# Patient Record
Sex: FEMALE | Race: WHITE | Employment: OTHER | URBAN - METROPOLITAN AREA
[De-identification: names, ages, dates, MRNs, and addresses within clinical notes are randomized per-mention and may not be internally consistent; named-entity substitution may affect disease eponyms.]

---

## 2019-06-26 ENCOUNTER — TELEPHONE (OUTPATIENT)
Dept: INTERNAL MEDICINE CLINIC | Age: 69
End: 2019-06-26

## 2019-06-26 ENCOUNTER — OFFICE VISIT (OUTPATIENT)
Dept: INTERNAL MEDICINE CLINIC | Age: 69
End: 2019-06-26

## 2019-06-26 VITALS
HEART RATE: 90 BPM | RESPIRATION RATE: 16 BRPM | WEIGHT: 203 LBS | HEIGHT: 64 IN | TEMPERATURE: 97.3 F | BODY MASS INDEX: 34.66 KG/M2 | DIASTOLIC BLOOD PRESSURE: 82 MMHG | OXYGEN SATURATION: 100 % | SYSTOLIC BLOOD PRESSURE: 132 MMHG

## 2019-06-26 DIAGNOSIS — Z00.00 INITIAL MEDICARE ANNUAL WELLNESS VISIT: Primary | ICD-10-CM

## 2019-06-26 DIAGNOSIS — Z94.0 KIDNEY TRANSPLANT RECIPIENT: ICD-10-CM

## 2019-06-26 DIAGNOSIS — Z12.4 SCREENING FOR CERVICAL CANCER: ICD-10-CM

## 2019-06-26 DIAGNOSIS — Z13.5 SCREENING FOR GLAUCOMA: ICD-10-CM

## 2019-06-26 DIAGNOSIS — G47.00 INSOMNIA, UNSPECIFIED TYPE: ICD-10-CM

## 2019-06-26 DIAGNOSIS — M81.0 POSTMENOPAUSAL BONE LOSS: ICD-10-CM

## 2019-06-26 DIAGNOSIS — Z12.39 BREAST SCREENING, UNSPECIFIED: ICD-10-CM

## 2019-06-26 DIAGNOSIS — Z23 ENCOUNTER FOR IMMUNIZATION: ICD-10-CM

## 2019-06-26 DIAGNOSIS — C85.91 NON-HODGKIN LYMPHOMA OF LYMPH NODES OF NECK, UNSPECIFIED NON-HODGKIN LYMPHOMA TYPE (HCC): ICD-10-CM

## 2019-06-26 PROBLEM — E66.9 OBESITY (BMI 30-39.9): Status: ACTIVE | Noted: 2019-06-26

## 2019-06-26 PROBLEM — G47.09 OTHER INSOMNIA: Chronic | Status: ACTIVE | Noted: 2019-06-26

## 2019-06-26 RX ORDER — TEMAZEPAM 7.5 MG/1
7.5 CAPSULE ORAL
Qty: 30 CAP | Refills: 5 | Status: SHIPPED | OUTPATIENT
Start: 2019-06-26 | End: 2019-12-30 | Stop reason: SDUPTHER

## 2019-06-26 RX ORDER — AMITRIPTYLINE HYDROCHLORIDE 50 MG/1
50 TABLET, FILM COATED ORAL
COMMUNITY
Start: 2019-04-25 | End: 2019-11-04 | Stop reason: SDUPTHER

## 2019-06-26 RX ORDER — SODIUM BICARBONATE 650 MG/1
TABLET ORAL 4 TIMES DAILY
COMMUNITY
End: 2019-12-06 | Stop reason: SDUPTHER

## 2019-06-26 RX ORDER — CIPROFLOXACIN 500 MG/1
500 TABLET ORAL
COMMUNITY
Start: 2019-03-25 | End: 2020-06-29 | Stop reason: ALTCHOICE

## 2019-06-26 RX ORDER — SERTRALINE HYDROCHLORIDE 50 MG/1
50 TABLET, FILM COATED ORAL
COMMUNITY
Start: 2019-04-28 | End: 2019-06-26

## 2019-06-26 RX ORDER — SUVOREXANT 10 MG/1
10 TABLET, FILM COATED ORAL
COMMUNITY
Start: 2019-06-03 | End: 2019-06-26

## 2019-06-26 RX ORDER — PREDNISONE 5 MG/1
5 TABLET ORAL DAILY
COMMUNITY
Start: 2019-03-30 | End: 2020-01-27

## 2019-06-26 RX ORDER — FERROUS FUMARATE 324(106)MG
TABLET ORAL DAILY
COMMUNITY
End: 2019-12-30 | Stop reason: SDUPTHER

## 2019-06-26 RX ORDER — CYCLOSPORINE 25 MG/1
25 CAPSULE, GELATIN COATED ORAL 2 TIMES DAILY
COMMUNITY
Start: 2019-06-03 | End: 2020-06-29

## 2019-06-26 NOTE — PATIENT INSTRUCTIONS
Medicare Wellness Visit, Female     The best way to live healthy is to have a lifestyle where you eat a well-balanced diet, exercise regularly, limit alcohol use, and quit all forms of tobacco/nicotine, if applicable. Regular preventive services are another way to keep healthy. Preventive services (vaccines, screening tests, monitoring & exams) can help personalize your care plan, which helps you manage your own care. Screening tests can find health problems at the earliest stages, when they are easiest to treat. [facility] follows the current, evidence-based guidelines published by the [facility] ([facility]) when recommending preventive services for our patients. Because we follow these guidelines, sometimes recommendations change over time as research supports it. (For example, mammograms used to be recommended annually. Even though Medicare will still pay for an annual mammogram, the newer guidelines recommend a mammogram every two years for women of average risk.)  Of course, you and your doctor may decide to screen more often for some diseases, based on your risk and your health status. Preventive services for you include:  - Medicare offers their members a free annual wellness visit, which is time for you and your primary care provider to discuss and plan for your preventive service needs. Take advantage of this benefit every year!  -All adults over the age of 72 should receive the recommended pneumonia vaccines. Current USPSTF guidelines recommend a series of two vaccines for the best pneumonia protection.   -All adults should have a flu vaccine yearly and a tetanus vaccine every 10 years. All adults age 61 and older should receive a shingles vaccine once in their lifetime.    -A bone mass density test is recommended when a woman turns 65 to screen for osteoporosis. This test is only recommended one time, as a screening.  Some providers will use this same test as a disease monitoring tool if you already have osteoporosis. -All adults age 38-68 who are overweight should have a diabetes screening test once every three years.   -Other screening tests and preventive services for persons with diabetes include: an eye exam to screen for diabetic retinopathy, a kidney function test, a foot exam, and stricter control over your cholesterol.   -Cardiovascular screening for adults with routine risk involves an electrocardiogram (ECG) at intervals determined by your doctor.   -Colorectal cancer screenings should be done for adults age 54-65 with no increased risk factors for colorectal cancer. There are a number of acceptable methods of screening for this type of cancer. Each test has its own benefits and drawbacks. Discuss with your doctor what is most appropriate for you during your annual wellness visit. The different tests include: colonoscopy (considered the best screening method), a fecal occult blood test, a fecal DNA test, and sigmoidoscopy. -Breast cancer screenings are recommended every other year for women of normal risk, age 54-69.  -Cervical cancer screenings for women over age 72 are only recommended with certain risk factors.   -All adults born between [facility] should be screened once for Hepatitis C. Here is a list of your current Health Maintenance items (your personalized list of preventive services) with a due date:  Health Maintenance Due   Topic Date Due    Hepatitis C Test  1950    DTaP/Tdap/Td  (1 - Tdap) 03/10/1969    Shingles Vaccine (1 of 2) 03/10/1998    Mammogram  03/10/1998    Stool testing for trace blood  03/10/1998    Glaucoma Screening   03/10/2013    Bone Mineral Density   03/10/2013    Pneumococcal Vaccine (1 of 2 - PCV13) 03/10/2013    Annual Well Visit  04/13/2019       Try to get mammogram, pap/pelvic and dexa scan done before next visit with me.

## 2019-06-26 NOTE — PROGRESS NOTES
Identified pt with two pt identifiers(name and ). Reviewed record in preparation for visit and have obtained necessary documentation. Chief Complaint   Patient presents with   [address] Maintenance Due   Topic    Hepatitis C Screening     DTaP/Tdap/Td series (1 - Tdap)    Shingrix Vaccine Age 50> (1 of 2)    BREAST CANCER SCRN MAMMOGRAM     FOBT Q 1 YEAR AGE 50-75     GLAUCOMA SCREENING Q2Y     Bone Densitometry (Dexa) Screening     Pneumococcal 65+ years (1 of 2 - PCV13)    MEDICARE YEARLY EXAM        Coordination of Care Questionnaire:   1) Have you been to an emergency room, urgent care, or hospitalized since your last visit? If yes, where when, and reason for visit? no       2. Have seen or consulted any other health care provider since your last visit? If yes, where when, and reason for visit? Stephanie Alicea, nephrologist      3) Do you have an Advanced Directive/ Living Will in place? NO  If yes, do we have a copy on file NO  If no, would you like information NO    Patient is accompanied by self I have received verbal consent from Taras Polk to discuss any/all medical information while they are present in the room.     Visit Vitals  /82 (BP 1 Location: Right arm, BP Patient Position: Sitting)   Pulse 90   Temp 97.3 °F (36.3 °C) (Oral)   Resp 16   Ht 5' 4\" (1.626 m)   Wt 203 lb (92.1 kg)   SpO2 100%   BMI 34.84 kg/m²

## 2019-06-26 NOTE — PROGRESS NOTES
Bibiana Mcconnell is a 68 y.o. female who presents for evaluation of awv, npv. Used to see dr Nimco Murphy in [place], last saw him about 3 years ago. Has followed every 6 months with dr Elia Gomez for years sp kidney transplant. Will follow with dr Maren Lentz now. Her daughter sees dr Aubrie Bartholomew here. Overall doing well, biggest issue is poor sleep.       ROS:  Constitutional: negative for fevers, chills, anorexia and weight loss  Eyes:   negative for visual disturbance and irritation  ENT:   negative for tinnitus,sore throat,nasal congestion,ear pain,hoarseness  Respiratory:  negative for cough, hemoptysis, dyspnea,wheezing  CV:   negative for chest pain, palpitations, lower extremity edema  GI:   negative for nausea, vomiting, diarrhea, abdominal pain,melena  Genitourinary: negative for frequency, dysuria and hematuria  Musculoskel: negative for myalgias, arthralgias, back pain, muscle weakness, joint pain  Neurological:  negative for headaches, dizziness, focal weakness, numbness  Psychiatric:     Negative for depression or anxiety      Past Medical History:   Diagnosis Date    Cancer ([facility] [address]     Chronic kidney disease        Past Surgical History:   Procedure Laterality Date    HX GYN      hysterectomy     HX TRANSPLANT  04/03/1997    kidney       Family History   Problem Relation Age of Onset    Diabetes Mother        Social History     Socioeconomic History    Marital status:      Spouse name: Not on file    Number of children: Not on file    Years of education: Not on file    Highest education level: Not on file   Occupational History    Not on file   Social Needs    Financial resource strain: Not on file    Food insecurity:     Worry: Not on file     Inability: Not on file    Transportation needs:     Medical: Not on file     Non-medical: Not on file   Tobacco Use    Smoking status: Never Smoker    Smokeless tobacco: Never Used   Substance and Sexual Activity    Alcohol use: Not Currently     Frequency: Never    Drug use: Never    Sexual activity: Not Currently   Lifestyle    Physical activity:     Days per week: Not on file     Minutes per session: Not on file    Stress: Not on file   Relationships    Social connections:     Talks on phone: Not on file     Gets together: Not on file     Attends Hoahaoism service: Not on file     Active member of club or organization: Not on file     Attends meetings of clubs or organizations: Not on file     Relationship status: Not on file    Intimate partner violence:     Fear of current or ex partner: Not on file     Emotionally abused: Not on file     Physically abused: Not on file     Forced sexual activity: Not on file   Other Topics Concern    Not on file   Social History Narrative    Not on file            Visit Vitals  /82 (BP 1 Location: Right arm, BP Patient Position: Sitting)   Pulse 90   Temp 97.3 °F (36.3 °C) (Oral)   Resp 16   Ht 5' 4\" (1.626 m)   Wt 203 lb (92.1 kg)   LMP  (Exact Date)   SpO2 100%   BMI 34.84 kg/m²       Physical Examination:   General - Well appearing female  HEENT - PERRL, TM no erythema/opacification, normal nasal turbinates, no oropharyngeal erythema or exudate, MMM  Neck - supple, no bruits, no thyroidomegaly, no lymphadenopathy  Pulm - clear to auscultation bilaterally  Cardio - RRR, normal S1 S2, no murmur  Abd - soft, nontender, no masses, no HSM  Extrem - no edema, +2 distal pulses  Neuro-  No focal deficits, CN intact     Assessment/Plan:    1. Insomnia--no improvement with belsomra, and is cost prohibitive. Fransisco Roach worked well, but insurance stopped covering it. rx to try elavil  2. Hx kidney transplant--on cyclospine and prednisone. Will follow with dr Kaykay Rutherford  3. Hx NHL--last was 2005, no longer follows with oncology. Had been dr Michael Huston  4. Hx RA--has never been on any dmards, had seen rheum briefly, dr Monster Vincent scan ordered. Had colon with dr Cochran Done.   Referral to gyn and ophtho. rtc 6 months. Get records from dr Kevin Chu, he did labs in April (pt states he checked tsh, a1c, flp etal)        Francisco Mercury III, DO              This is an Initial Medicare Annual Wellness Exam (AWV) (Performed 12 months after IPPE or effective date of Medicare Part B enrollment, Once in a lifetime)    I have reviewed the patient's medical history in detail and updated the computerized patient record. History     Past Medical History:   Diagnosis Date    Cancer ([facility] [address]     Chronic kidney disease       Past Surgical History:   Procedure Laterality Date    HX GYN      hysterectomy     HX TRANSPLANT  04/03/1997    kidney     Current Outpatient Medications   Medication Sig Dispense Refill    amitriptyline (ELAVIL) 50 mg tablet Take 50 mg by mouth nightly.  ciprofloxacin HCl (CIPRO) 500 mg tablet Take 500 mg by mouth every Monday, Wednesday, Friday.  BELSOMRA 10 mg tablet Take 10 mg by mouth nightly.  sertraline (ZOLOFT) 50 mg tablet Take 50 mg by mouth nightly.  predniSONE (DELTASONE) 5 mg tablet Take 5 mg by mouth daily.  cycloSPORINE (SANDIMMUNE) 25 mg capsule Take 25 mg by mouth two (2) times a day. Take 3 tablets 2 times a day      calcium-cholecalciferol, d3, (CALCIUM 600 + D) 600-125 mg-unit tab Take 600 mg by mouth two (2) times a day.  ferrous fumarate 324 mg (106 mg iron) tab Take  by mouth.  sodium bicarbonate 650 mg tablet Take  by mouth four (4) times daily. Not on File  Family History   Problem Relation Age of Onset    Diabetes Mother      Social History     Tobacco Use    Smoking status: Never Smoker    Smokeless tobacco: Never Used   Substance Use Topics    Alcohol use: Not Currently     Frequency: Never     Patient Active Problem List   Diagnosis Code    Obesity (BMI 30-39. 9) E66.9    Other insomnia G47.09       Depression Risk Factor Screening:     3 most recent PHQ Screens 6/26/2019   Little interest or pleasure in doing things Not at all   Feeling down, depressed, irritable, or hopeless Not at all   Total Score PHQ 2 0     Alcohol Risk Factor Screening: You do not drink alcohol or very rarely. Functional Ability and Level of Safety:     Hearing Loss  Hearing is good. Activities of Daily Living  The home contains: no safety equipment. Patient does total self care    Fall Risk  Fall Risk Assessment, last 12 mths 6/26/2019   Able to walk? Yes   Fall in past 12 months? No       Abuse Screen  Patient is not abused. Lives with daughter now. Cognitive Screening   Evaluation of Cognitive Function:  Has your family/caregiver stated any concerns about your memory: no  Normal    Patient Care Team   Patient Care Team:  Samir Santillan DO as PCP - General (Internal Medicine)    Assessment/Plan   Education and counseling provided:  Are appropriate based on today's review and evaluation  End-of-Life planning (with patient's consent)  Pneumococcal Vaccine  Screening Mammography  Screening Pap and pelvic (covered once every 2 years)    Diagnoses and all orders for this visit:    1.  Initial Medicare annual wellness visit         Health Maintenance Due   Topic Date Due    Hepatitis C Screening  1950    DTaP/Tdap/Td series (1 - Tdap) 03/10/1969    Shingrix Vaccine Age 50> (1 of 2) 03/10/1998    BREAST CANCER SCRN MAMMOGRAM  03/10/1998    FOBT Q 1 YEAR AGE 50-75  03/10/1998    GLAUCOMA SCREENING Q2Y  03/10/2013    Bone Densitometry (Dexa) Screening  03/10/2013    Pneumococcal 65+ years (1 of 2 - PCV13) 03/10/2013    MEDICARE YEARLY EXAM  04/13/2019

## 2019-07-30 ENCOUNTER — HOSPITAL ENCOUNTER (OUTPATIENT)
Dept: MAMMOGRAPHY | Age: 69
Discharge: HOME | End: 2019-07-30
Attending: INTERNAL MEDICINE
Payer: MEDICARE

## 2019-07-30 DIAGNOSIS — M81.0 POSTMENOPAUSAL BONE LOSS: ICD-10-CM

## 2019-07-30 DIAGNOSIS — Z12.39 BREAST SCREENING, UNSPECIFIED: ICD-10-CM

## 2019-07-30 PROCEDURE — 77067 SCR MAMMO BI INCL CAD: CPT

## 2019-07-30 PROCEDURE — 77080 DXA BONE DENSITY AXIAL: CPT

## 2019-08-02 NOTE — PROGRESS NOTES
Called, spoke to pt. Two identifiers confirmed. Pt notified of results/recommendations per Dr. Amirah Dodson. Pt verbalized understanding of information discussed w/ no further questions at this time.

## 2019-08-07 ENCOUNTER — DOCUMENTATION ONLY (OUTPATIENT)
Dept: INTERNAL MEDICINE CLINIC | Age: 69
End: 2019-08-07

## 2019-08-19 ENCOUNTER — HOSPITAL ENCOUNTER (OUTPATIENT)
Dept: INFUSION THERAPY | Age: 69
Discharge: HOME | End: 2019-08-19
Payer: MEDICARE

## 2019-08-19 ENCOUNTER — TELEPHONE (OUTPATIENT)
Dept: INTERNAL MEDICINE CLINIC | Age: 69
End: 2019-08-19

## 2019-08-19 VITALS
DIASTOLIC BLOOD PRESSURE: 81 MMHG | SYSTOLIC BLOOD PRESSURE: 126 MMHG | TEMPERATURE: 97.7 F | HEART RATE: 93 BPM | RESPIRATION RATE: 18 BRPM | OXYGEN SATURATION: 100 %

## 2019-08-19 LAB
ALBUMIN SERPL-MCNC: 3 G/DL (ref 3.5–5)
ANION GAP SERPL CALC-SCNC: 6 MMOL/L (ref 5–15)
BUN SERPL-MCNC: 19 MG/DL (ref 6–20)
BUN/CREAT SERPL: 19 (ref 12–20)
CALCIUM SERPL-MCNC: 8.4 MG/DL (ref 8.5–10.1)
CHLORIDE SERPL-SCNC: 114 MMOL/L (ref 97–108)
CO2 SERPL-SCNC: 21 MMOL/L (ref 21–32)
CREAT SERPL-MCNC: 0.99 MG/DL (ref 0.55–1.02)
GLUCOSE SERPL-MCNC: 160 MG/DL (ref 65–100)
MAGNESIUM SERPL-MCNC: 1.9 MG/DL (ref 1.6–2.4)
PHOSPHATE SERPL-MCNC: 3.8 MG/DL (ref 2.6–4.7)
PHOSPHATE SERPL-MCNC: 3.8 MG/DL (ref 2.6–4.7)
POTASSIUM SERPL-SCNC: 4 MMOL/L (ref 3.5–5.1)
SODIUM SERPL-SCNC: 141 MMOL/L (ref 136–145)

## 2019-08-19 PROCEDURE — 84100 ASSAY OF PHOSPHORUS: CPT

## 2019-08-19 PROCEDURE — 36415 COLL VENOUS BLD VENIPUNCTURE: CPT

## 2019-08-19 PROCEDURE — 83735 ASSAY OF MAGNESIUM: CPT

## 2019-08-19 PROCEDURE — 80069 RENAL FUNCTION PANEL: CPT

## 2019-08-19 NOTE — PROGRESS NOTES
[address] @ [facility] VISIT NOTE     2597 Patient arrives for Labs/Prolia Injection without acute problems. Please see connect care for complete assessment and education provided. Vital signs stable throughout and prior to discharge, Pt. Informed RN that she was taking Cyclosporine & Prednisone with RN unable to confirm with referring MD to give medication @ this time. Pharmacist stated medication is a Class C & could increase the side effects but, it should be safe to give. Patient stated she would prefer to discuss with her transplant MD prior to receiving Prolia. RN instructed patient that her labs would be good for 30 days & to reschedule her [facility] appointment as needed, after discussing with transplant MD; copy of labs given to patient with verbal understanding noted by patient.         VITAL SIGNS  Patient Vitals for the past 12 hrs:   Temp Pulse Resp BP SpO2   08/19/19 1056 97.7 °F (36.5 °C) 93 18 126/81 100 %          LAB WORK   Recent Results (from the past 12 hour(s))   MAGNESIUM    Collection Time: 08/19/19 11:10 AM   Result Value Ref Range    Magnesium 1.9 1.6 - 2.4 mg/dL   PHOSPHORUS    Collection Time: 08/19/19 11:10 AM   Result Value Ref Range    Phosphorus 3.8 2.6 - 4.7 MG/DL   RENAL FUNCTION PANEL    Collection Time: 08/19/19 11:10 AM   Result Value Ref Range    Sodium 141 136 - 145 mmol/L    Potassium 4.0 3.5 - 5.1 mmol/L    Chloride 114 (H) 97 - 108 mmol/L    CO2 21 21 - 32 mmol/L    Anion gap 6 5 - 15 mmol/L    Glucose 160 (H) 65 - 100 mg/dL    BUN 19 6 - 20 MG/DL    Creatinine 0.99 0.55 - 1.02 MG/DL    BUN/Creatinine ratio 19 12 - 20      GFR est AA >60 >60 ml/min/1.73m2    GFR est non-AA 55 (L) >60 ml/min/1.73m2    Calcium 8.4 (L) 8.5 - 10.1 MG/DL    Phosphorus 3.8 2.6 - 4.7 MG/DL    Albumin 3.0 (L) 3.5 - 5.0 g/dL

## 2019-08-19 NOTE — TELEPHONE ENCOUNTER
Spoke with a nurse from the infusion center concerning patient is starting Prolia  Infusion. The nurse need approval from 's to infuse Prolia patient is take Cyclosporine which is given by another doctor. In formed her that Dr. Clarissa Mendez  Is out of the office. She was advised to check with the doctor that prescribed the Cyclosporine.

## 2019-08-20 ENCOUNTER — HOSPITAL ENCOUNTER (OUTPATIENT)
Dept: INFUSION THERAPY | Age: 69
Discharge: HOME | End: 2019-08-20
Payer: MEDICARE

## 2019-08-20 VITALS
HEART RATE: 96 BPM | TEMPERATURE: 98.3 F | RESPIRATION RATE: 18 BRPM | SYSTOLIC BLOOD PRESSURE: 134 MMHG | DIASTOLIC BLOOD PRESSURE: 93 MMHG

## 2019-08-20 PROCEDURE — 96372 THER/PROPH/DIAG INJ SC/IM: CPT

## 2019-08-20 PROCEDURE — 74011250636 HC RX REV CODE- 250/636: Performed by: INTERNAL MEDICINE

## 2019-08-20 RX ADMIN — DENOSUMAB 60 MG: 60 INJECTION SUBCUTANEOUS at 13:38

## 2019-08-20 NOTE — PROGRESS NOTES
[address] at [address]    0903 Patient arrives for Prolia without acute problems. Please see connect care for complete assessment and education provided. Vital signs stable throughout and prior to discharge, Pt. Tolerated treatment well and discharged without incident. Patient/parent is aware of next [facility] appointment on 2/11/2020. Appointment card given to patient. Medications Verified by Sissy Patino RN & Mel Marcum RN via [place] [employer]:  1. [id number]   Patient Vitals for the past 12 hrs:   Temp Pulse Resp BP   08/20/19 1337 98.3 °F (36.8 °C) 96 18 (!) 134/93       LAB WORK done 8/19/19.

## 2019-11-04 RX ORDER — AMITRIPTYLINE HYDROCHLORIDE 50 MG/1
50 TABLET, FILM COATED ORAL
Qty: 30 TAB | Refills: 5 | Status: SHIPPED | OUTPATIENT
Start: 2019-11-04 | End: 2019-12-06 | Stop reason: SDUPTHER

## 2019-11-04 NOTE — TELEPHONE ENCOUNTER
----- Message from [facility] sent at 11/4/2019 12:54 PM EST -----  Regarding: Dr. Jamilah Killian  Contact: [phone]  Medication Refill    Best contact number(s): [phone]      Name of medication and dosage if known: \"Amitriptyline\" HCL 50mg      Is patient out of this medication (yes/no):Yes, been out for a week.       Pharmacy name: [facility]    Pharmacy listed in chart? (yes/no):Yes  Pharmacy phone number: [phone]    [address]

## 2019-11-04 NOTE — TELEPHONE ENCOUNTER
PCP: Sulaiman Florez DO    Last appt: 6/26/2019  Future Appointments   Date Time Provider Yoseph Vincentisti   12/23/2019 11:45 AM Ashley Bonilla III, DO [facility]   2/17/2020 11:00 AM [facility]       Requested Prescriptions     Pending Prescriptions Disp Refills    amitriptyline (ELAVIL) 50 mg tablet 30 Tab 5     Sig: Take 1 Tab by mouth nightly.

## 2019-11-25 ENCOUNTER — TELEPHONE (OUTPATIENT)
Dept: INTERNAL MEDICINE CLINIC | Age: 69
End: 2019-11-25

## 2019-11-25 NOTE — TELEPHONE ENCOUNTER
Patient states she needs a call back to get an Acute Appt asap for Joint pain in her legs. Please call.  Thank you

## 2019-11-26 NOTE — TELEPHONE ENCOUNTER
Called, spoke to pt. Two identifiers confirmed. Appointment scheduled for 12/6 @ 130 with Dr. Angy Reilly.   Pt verbalized understanding of information discussed w/ no further questions at this time.

## 2019-11-27 ENCOUNTER — HOSPITAL ENCOUNTER (EMERGENCY)
Age: 69
Discharge: HOME | End: 2019-11-27
Attending: EMERGENCY MEDICINE
Payer: MEDICARE

## 2019-11-27 ENCOUNTER — APPOINTMENT (OUTPATIENT)
Dept: GENERAL RADIOLOGY | Age: 69
End: 2019-11-27
Attending: PHYSICIAN ASSISTANT
Payer: MEDICARE

## 2019-11-27 VITALS
BODY MASS INDEX: 32.03 KG/M2 | WEIGHT: 192.24 LBS | SYSTOLIC BLOOD PRESSURE: 145 MMHG | DIASTOLIC BLOOD PRESSURE: 95 MMHG | RESPIRATION RATE: 20 BRPM | HEART RATE: 103 BPM | HEIGHT: 65 IN | OXYGEN SATURATION: 97 % | TEMPERATURE: 97.5 F

## 2019-11-27 DIAGNOSIS — M54.42 ACUTE LEFT-SIDED LOW BACK PAIN WITH LEFT-SIDED SCIATICA: Primary | ICD-10-CM

## 2019-11-27 LAB
APPEARANCE UR: ABNORMAL
BACTERIA URNS QL MICRO: ABNORMAL /HPF
BILIRUB UR QL: NEGATIVE
COLOR UR: ABNORMAL
EPITH CASTS URNS QL MICRO: ABNORMAL /LPF
GLUCOSE UR STRIP.AUTO-MCNC: NEGATIVE MG/DL
HGB UR QL STRIP: NEGATIVE
KETONES UR QL STRIP.AUTO: NEGATIVE MG/DL
LEUKOCYTE ESTERASE UR QL STRIP.AUTO: ABNORMAL
NITRITE UR QL STRIP.AUTO: NEGATIVE
PH UR STRIP: 6 [PH] (ref 5–8)
PROT UR STRIP-MCNC: NEGATIVE MG/DL
RBC #/AREA URNS HPF: ABNORMAL /HPF (ref 0–5)
SP GR UR REFRACTOMETRY: 1.01 (ref 1–1.03)
UA: UC IF INDICATED,UAUC: ABNORMAL
UROBILINOGEN UR QL STRIP.AUTO: 2 EU/DL (ref 0.2–1)
WBC URNS QL MICRO: ABNORMAL /HPF (ref 0–4)

## 2019-11-27 PROCEDURE — 74011250637 HC RX REV CODE- 250/637: Performed by: PHYSICIAN ASSISTANT

## 2019-11-27 PROCEDURE — 87077 CULTURE AEROBIC IDENTIFY: CPT

## 2019-11-27 PROCEDURE — 99283 EMERGENCY DEPT VISIT LOW MDM: CPT

## 2019-11-27 PROCEDURE — 74011000250 HC RX REV CODE- 250: Performed by: PHYSICIAN ASSISTANT

## 2019-11-27 PROCEDURE — 74011636637 HC RX REV CODE- 636/637: Performed by: PHYSICIAN ASSISTANT

## 2019-11-27 PROCEDURE — 87086 URINE CULTURE/COLONY COUNT: CPT

## 2019-11-27 PROCEDURE — 72100 X-RAY EXAM L-S SPINE 2/3 VWS: CPT

## 2019-11-27 PROCEDURE — 87186 SC STD MICRODIL/AGAR DIL: CPT

## 2019-11-27 PROCEDURE — 81001 URINALYSIS AUTO W/SCOPE: CPT

## 2019-11-27 RX ORDER — HYDROCODONE BITARTRATE AND ACETAMINOPHEN 5; 325 MG/1; MG/1
1 TABLET ORAL
Status: COMPLETED | OUTPATIENT
Start: 2019-11-27 | End: 2019-11-27

## 2019-11-27 RX ORDER — DIPHENHYDRAMINE HCL 25 MG
25 CAPSULE ORAL
Status: COMPLETED | OUTPATIENT
Start: 2019-11-27 | End: 2019-11-27

## 2019-11-27 RX ORDER — LIDOCAINE 4 G/100G
1 PATCH TOPICAL
Status: DISCONTINUED | OUTPATIENT
Start: 2019-11-27 | End: 2019-11-27 | Stop reason: HOSPADM

## 2019-11-27 RX ORDER — METHOCARBAMOL 750 MG/1
750 TABLET, FILM COATED ORAL
Status: COMPLETED | OUTPATIENT
Start: 2019-11-27 | End: 2019-11-27

## 2019-11-27 RX ORDER — PREDNISONE 20 MG/1
60 TABLET ORAL
Status: COMPLETED | OUTPATIENT
Start: 2019-11-27 | End: 2019-11-27

## 2019-11-27 RX ORDER — PREDNISONE 10 MG/1
TABLET ORAL
Qty: 21 TAB | Refills: 0 | Status: SHIPPED | OUTPATIENT
Start: 2019-11-27 | End: 2019-12-03

## 2019-11-27 RX ORDER — LIDOCAINE 4 G/100G
PATCH TOPICAL
Qty: 15 PATCH | Refills: 0 | Status: SHIPPED | OUTPATIENT
Start: 2019-11-27 | End: 2019-12-23

## 2019-11-27 RX ORDER — TRAMADOL HYDROCHLORIDE 50 MG/1
50 TABLET ORAL
Qty: 10 TAB | Refills: 0 | Status: SHIPPED | OUTPATIENT
Start: 2019-11-27 | End: 2019-11-30

## 2019-11-27 RX ORDER — METHOCARBAMOL 750 MG/1
750 TABLET, FILM COATED ORAL 4 TIMES DAILY
Qty: 20 TAB | Refills: 0 | Status: SHIPPED | OUTPATIENT
Start: 2019-11-27 | End: 2019-12-23

## 2019-11-27 RX ADMIN — METHOCARBAMOL TABLETS 750 MG: 750 TABLET, COATED ORAL at 14:39

## 2019-11-27 RX ADMIN — PREDNISONE 60 MG: 20 TABLET ORAL at 14:39

## 2019-11-27 RX ADMIN — HYDROCODONE BITARTRATE AND ACETAMINOPHEN 1 TABLET: 5; 325 TABLET ORAL at 14:39

## 2019-11-27 RX ADMIN — DIPHENHYDRAMINE HYDROCHLORIDE 25 MG: 25 CAPSULE ORAL at 15:52

## 2019-11-27 NOTE — ED PROVIDER NOTES
EMERGENCY DEPARTMENT HISTORY AND PHYSICAL EXAM      Date: 11/27/2019  Patient Name: Lester Lind    History of Presenting Illness     Chief Complaint   Patient presents with    Back Pain     Pt reports back pain radiating down left leg since yesterday.  Leg Pain       History Provided By: Patient    HPI: Lester Lind, 68 y.o. female with PMHx significant for kidney transplant, presents by POV to the ED with cc of left lower back pain that radiates down the left leg. Her pain started yesterday. There is no injury. Her pain is been constant since onset. It is progressively worsened. The pain is exacerbated by movement. She is taken Tylenol and applied an icy hot patch without relief. There are no other complaints, changes, or physical findings at this time. PCP: Lois De Jesus, DO    No current facility-administered medications on file prior to encounter. Current Outpatient Medications on File Prior to Encounter   Medication Sig Dispense Refill    amitriptyline (ELAVIL) 50 mg tablet Take 1 Tab by mouth nightly. 30 Tab 5    ciprofloxacin HCl (CIPRO) 500 mg tablet Take 500 mg by mouth every Monday, Wednesday, Friday.  predniSONE (DELTASONE) 5 mg tablet Take 5 mg by mouth daily.  cycloSPORINE (SANDIMMUNE) 25 mg capsule Take 25 mg by mouth two (2) times a day. Take 3 tablets 2 times a day      calcium-cholecalciferol, d3, (CALCIUM 600 + D) 600-125 mg-unit tab Take 600 mg by mouth two (2) times a day.  ferrous fumarate 324 mg (106 mg iron) tab Take  by mouth.  sodium bicarbonate 650 mg tablet Take  by mouth four (4) times daily.  temazepam (RESTORIL) 7.5 mg capsule Take 1 Cap by mouth nightly as needed for Sleep.  Max Daily Amount: 7.5 mg. 30 Cap 5       Past History     Past Medical History:  Past Medical History:   Diagnosis Date    Cancer ([facility] [address]     Chronic kidney disease        Past Surgical History:  Past Surgical History:   Procedure Laterality Date    HX GYN      hysterectomy     HX TRANSPLANT  04/03/1997    kidney       Family History:  Family History   Problem Relation Age of Onset    Diabetes Mother        Social History:  Social History     Tobacco Use    Smoking status: Never Smoker    Smokeless tobacco: Never Used   Substance Use Topics    Alcohol use: Not Currently     Frequency: Never    Drug use: Never       Allergies:  No Known Allergies      Review of Systems   Review of Systems   Constitutional: Negative for chills, diaphoresis and fever. HENT: Negative for congestion, ear pain, rhinorrhea and sore throat. Respiratory: Negative for cough and shortness of breath. Cardiovascular: Negative for chest pain. Gastrointestinal: Negative for abdominal pain, constipation, diarrhea, nausea and vomiting. Denies incontinence of bowel. Genitourinary: Negative for difficulty urinating, dysuria, frequency and hematuria. Denies urinary incontinence and retention. Musculoskeletal: Positive for back pain and gait problem. Negative for arthralgias and myalgias. Neurological: Negative for weakness and headaches. Denies saddle paresthesias. All other systems reviewed and are negative. Physical Exam   Physical Exam  Vitals signs and nursing note reviewed. Constitutional:       General: She is not in acute distress. Appearance: She is well-developed. She is not diaphoretic. Comments: 68 y.o.  female    HENT:      Head: Normocephalic and atraumatic. Eyes:      General:         Right eye: No discharge. Left eye: No discharge. Conjunctiva/sclera: Conjunctivae normal.   Neck:      Musculoskeletal: Normal range of motion and neck supple. Cardiovascular:      Rate and Rhythm: Normal rate and regular rhythm. Heart sounds: Normal heart sounds. No murmur. Pulmonary:      Effort: Pulmonary effort is normal. No respiratory distress. Breath sounds: Normal breath sounds.    Musculoskeletal: Comments: BACK: Normal spinal curvatures. No step off or deformity. Tender to palpation to the lower back to the right of midline. Negative seated SLR bilaterally. Strength of the LE 5/5 and equal bilaterally. Patellar DTR's 2+ bilaterally. Ambulatory with pain. Skin:     General: Skin is warm and dry. Neurological:      Mental Status: She is alert and oriented to person, place, and time. Psychiatric:         Behavior: Behavior normal.         Diagnostic Study Results     Labs -   No results found for this or any previous visit (from the past 12 hour(s)). Radiologic Studies -   XR SPINE LUMB 2 OR 3 V   Final Result    impression: Minimal spondylosis . Medical Decision Making   I am the first provider for this patient. I reviewed the vital signs, available nursing notes, past medical history, past surgical history, family history and social history. Vital Signs-Reviewed the patient's vital signs. No data found. Records Reviewed: Nursing Notes and Old Medical Records    Provider Notes (Medical Decision Making):   Fracture, sprain, strain, UTI, spasm, DDD, DJD, herniated disk, sciatica,     ED Course:   Initial assessment performed. The patients presenting problems have been discussed, and they are in agreement with the care plan formulated and outlined with them. I have encouraged them to ask questions as they arise throughout their visit.    4:01 PM  The patient has been re-evaluated. She has been itching since receiving the medications provided in the ED. She has taken all the medications previously and does not remember this causing itching in the past. However, patient does note that she has had to take Benadryl in the past when taking pain medications. Critical Care Time: None    Disposition:  DISCHARGE NOTE:  4:40 PM  The pt is ready for discharge.  The pt's signs, symptoms, diagnosis, and discharge instructions have been discussed and pt has conveyed their understanding. The pt is to follow up as recommended or return to ER should their symptoms worsen. Plan has been discussed and pt is in agreement. PLAN:  1. Discharge Medication List as of 11/27/2019  4:20 PM      START taking these medications    Details   traMADol (ULTRAM) 50 mg tablet Take 1 Tab by mouth every six (6) hours as needed for Pain for up to 3 days. Max Daily Amount: 200 mg. Indications: pain, Print, Disp-10 Tab, R-0      predniSONE (STERAPRED DS) 10 mg dose pack Standard 6 day taper, Normal, Disp-21 Tab, R-0      methocarbamol (ROBAXIN-750) 750 mg tablet Take 1 Tab by mouth four (4) times daily. , Normal, Disp-20 Tab, R-0      lidocaine 4 % patch Apply patch for 12 hours then remove for 12 hours. , Normal, Disp-15 Patch, R-0         CONTINUE these medications which have NOT CHANGED    Details   amitriptyline (ELAVIL) 50 mg tablet Take 1 Tab by mouth nightly., Normal, Disp-30 Tab, R-5      ciprofloxacin HCl (CIPRO) 500 mg tablet Take 500 mg by mouth every Monday, Wednesday, Friday., Historical Med      predniSONE (DELTASONE) 5 mg tablet Take 5 mg by mouth daily. , Historical Med      cycloSPORINE (SANDIMMUNE) 25 mg capsule Take 25 mg by mouth two (2) times a day. Take 3 tablets 2 times a day, Historical Med      calcium-cholecalciferol, d3, (CALCIUM 600 + D) 600-125 mg-unit tab Take 600 mg by mouth two (2) times a day., Historical Med      ferrous fumarate 324 mg (106 mg iron) tab Take  by mouth., Historical Med      sodium bicarbonate 650 mg tablet Take  by mouth four (4) times daily. , Historical Med      temazepam (RESTORIL) 7.5 mg capsule Take 1 Cap by mouth nightly as needed for Sleep. Max Daily Amount: 7.5 mg., Print, Disp-30 Cap, R-5           2.    Follow-up Information     Follow up With Specialties Details Why Contact Info    Irving Grewal MD Orthopedic Surgery In 1 week As needed [address]  [phone]          Return to ED if worse Diagnosis     Clinical Impression:   1. Acute left-sided low back pain with left-sided sciatica          Please note that this dictation was completed with [employer], the computer voice recognition software. Quite often unanticipated grammatical, syntax, homophones, and other interpretive errors are inadvertently transcribed by the computer software. Please disregards these errors. Please excuse any errors that have escaped final proofreading. This note will not be viewable in [address]

## 2019-11-27 NOTE — ED NOTES
Patient given discharge instructions. Patient was able to verbalize understanding of instructions. Questions answered  Patient wheeled out of ED in stable condition.

## 2019-11-27 NOTE — DISCHARGE INSTRUCTIONS
Patient Education        Getting Back to Normal After Low Back Pain: Care Instructions  Your Care Instructions  Almost everyone has low back pain at some time. The good news is that most low back pain will go away in a few days or weeks with some basic self-care. Some people are afraid that doing too much may make their pain worse. In the past, people stayed in bed, thinking this would help their backs. Now doctors think that, in most cases, getting back to your normal activities is good for your back, as long as you avoid doing things that make your pain worse. Follow-up care is a key part of your treatment and safety. Be sure to make and go to all appointments, and call your doctor if you are having problems. It's also a good idea to know your test results and keep a list of the medicines you take. How can you care for yourself at home? Ease back into daily activities  · For the first day or two of pain, take it easy. But as soon as possible, get back to your normal daily life and activities. · Get gentle exercise, such as walking. Movement keeps your spine flexible and helps your muscles stay strong. · If you are an athlete, return to your activity carefully. Choose a low-impact option until your pain is under control. Avoid or change activities that cause pain  · Try to avoid too much bending, heavy lifting, or reaching. These movements put extra stress on your back. · In bed, try lying on your side with a pillow between your knees. Or lie on your back on the floor with a pillow under your knees. · When you sit, place a small pillow, a rolled-up towel, or a lumbar roll in the curve of your back for extra support. · Try putting one foot up on a stool or changing positions every few minutes if you have to stand still for a period of time. Pay attention to body mechanics and posture  Body mechanics are the way you use your body. Posture is the way you sit or stand. · Take extra care when you lift.  When you must lift, bend your knees and keep your back straight. Avoid twisting, and keep the load close to your body. · Stand or sit tall, with your shoulders back and your stomach pulled in to support your back. Get support when you need it  · Let people know when you need a helping hand. Get family members or friends to help out with tasks you cannot do right now. · Be honest with your doctor about how the pain affects you. · If you have had to take time off work, talk to your doctor and boss about a gradual [id number] plan. Find out if there are other ways you could do your job to avoid hurting your back again. Reduce stress  Worrying about the pain can cause you to tense the muscles in your lower back. This in turn causes more pain. Here are a few things you can do to relax your mind and your muscles:  · Take 10 to 15 minutes to sit quietly and breathe deeply. Try to focus only on your breathing. If you cannot keep thoughts away, think about things that make you feel good. · Get involved in your favorite hobby, or try something new. · Talk to a friend, read a book, or listen to your favorite music. · Find a counselor you like and trust. Talk openly and honestly about your problems. Be willing to make some changes. When should you call for help? Call 911 anytime you think you may need emergency care. For example, call if:    · You are unable to move a leg at all.   [facility] your doctor now or seek immediate medical care if:    · You have new or worse symptoms in your legs, belly, or buttocks. Symptoms may include:  ? Numbness or tingling. ? Weakness. ? Pain.     · You lose bladder or bowel control.    Watch closely for changes in your health, and be sure to contact your doctor if:    · You have a fever, lose weight, or don't feel well.     · You are not getting better as expected. Where can you learn more? Go to http://erin-demetria.info/.   Enter L202 in the search box to learn more about \"Getting Back to Normal After Low Back Pain: Care Instructions. \"  Current as of: June 26, 2019  Content Version: 12.2  © 6055-7715 [employer]. Care instructions adapted under license by [employer] (which disclaims liability or warranty for this information). If you have questions about a medical condition or this instruction, always ask your healthcare professional. [address] any warranty or liability for your use of this information. Patient Education        Sciatica: Care Instructions  Your Care Instructions    Sciatica (say \"uww-QA-ts-kuh\") is an irritation of one of the sciatic nerves, which come from the spinal cord in the lower back. The sciatic nerves and their branches extend down through the buttock to the foot. Sciatica can develop when an injured disc in the back presses against a spinal nerve root. Its main symptom is pain, numbness, or weakness that is often worse in the leg or foot than in the back. Sciatica often will improve and go away with time. Early treatment usually includes medicines and exercises to relieve pain. Follow-up care is a key part of your treatment and safety. Be sure to make and go to all appointments, and call your doctor if you are having problems. It's also a good idea to know your test results and keep a list of the medicines you take. How can you care for yourself at home? · Take pain medicines exactly as directed. ? If the doctor gave you a prescription medicine for pain, take it as prescribed. ? If you are not taking a prescription pain medicine, ask your doctor if you can take an over-the-counter medicine. · Use heat or ice to relieve pain. ? To apply heat, put a warm water bottle, heating pad set on low, or warm cloth on your back. Do not go to sleep with a heating pad on your skin. ? To use ice, put ice or a cold pack on the area for 10 to 20 minutes at a time.  Put a thin cloth between the ice and your skin.  · Avoid sitting if possible, unless it feels better than standing. · Alternate lying down with short walks. Increase your walking distance as you are able to without making your symptoms worse. · Do not do anything that makes your symptoms worse. When should you call for help? Call 911 anytime you think you may need emergency care. For example, call if:    · You are unable to move a leg at all.   [facility] your doctor now or seek immediate medical care if:    · You have new or worse symptoms in your legs or buttocks. Symptoms may include:  ? Numbness or tingling. ? Weakness. ? Pain.     · You lose bladder or bowel control.    Watch closely for changes in your health, and be sure to contact your doctor if:    · You are not getting better as expected. Where can you learn more? Go to http://erin-demetria.info/. Enter [phone] in the search box to learn more about \"Sciatica: Care Instructions. \"  Current as of: June 26, 2019  Content Version: 12.2  © 3879-5081 [employer]. Care instructions adapted under license by [employer] (which disclaims liability or warranty for this information). If you have questions about a medical condition or this instruction, always ask your healthcare professional. [address] any warranty or liability for your use of this information.

## 2019-11-29 LAB
BACTERIA SPEC CULT: ABNORMAL
CC UR VC: ABNORMAL
SERVICE CMNT-IMP: ABNORMAL

## 2019-12-06 ENCOUNTER — OFFICE VISIT (OUTPATIENT)
Dept: INTERNAL MEDICINE CLINIC | Age: 69
End: 2019-12-06

## 2019-12-06 VITALS
TEMPERATURE: 98 F | DIASTOLIC BLOOD PRESSURE: 86 MMHG | BODY MASS INDEX: 32.26 KG/M2 | HEART RATE: 102 BPM | SYSTOLIC BLOOD PRESSURE: 129 MMHG | OXYGEN SATURATION: 99 % | HEIGHT: 65 IN | WEIGHT: 193.6 LBS | RESPIRATION RATE: 16 BRPM

## 2019-12-06 DIAGNOSIS — C85.91 NON-HODGKIN LYMPHOMA OF LYMPH NODES OF NECK, UNSPECIFIED NON-HODGKIN LYMPHOMA TYPE (HCC): ICD-10-CM

## 2019-12-06 DIAGNOSIS — M06.9 RHEUMATOID ARTHRITIS, INVOLVING UNSPECIFIED SITE, UNSPECIFIED RHEUMATOID FACTOR PRESENCE: ICD-10-CM

## 2019-12-06 DIAGNOSIS — Z94.0 KIDNEY TRANSPLANT RECIPIENT: ICD-10-CM

## 2019-12-06 DIAGNOSIS — M54.42 ACUTE LEFT-SIDED LOW BACK PAIN WITH LEFT-SIDED SCIATICA: Primary | ICD-10-CM

## 2019-12-06 PROBLEM — C85.90 NHL (NON-HODGKIN'S LYMPHOMA) (HCC): Status: ACTIVE | Noted: 2019-12-06

## 2019-12-06 RX ORDER — AMITRIPTYLINE HYDROCHLORIDE 50 MG/1
50 TABLET, FILM COATED ORAL
Qty: 90 TAB | Refills: 3 | Status: SHIPPED | OUTPATIENT
Start: 2019-12-06 | End: 2020-02-14 | Stop reason: ALTCHOICE

## 2019-12-06 RX ORDER — DIAZEPAM 2 MG/1
2-4 TABLET ORAL
Qty: 20 TAB | Refills: 0 | Status: SHIPPED | OUTPATIENT
Start: 2019-12-06 | End: 2020-02-14

## 2019-12-06 RX ORDER — SODIUM BICARBONATE 650 MG/1
650 TABLET ORAL 4 TIMES DAILY
Qty: 350 TAB | Refills: 3 | Status: SHIPPED | OUTPATIENT
Start: 2019-12-06 | End: 2020-05-18

## 2019-12-06 NOTE — LETTER
December 6, 2019 [place] [id number] Dear Shikha Resendez: Thank you for requesting access to [facility]. Please follow the instructions below to securely access and download your online medical record. [facility] allows you to send messages to your doctor, view your test results, renew your prescriptions, schedule appointments, and more. How Do I Sign Up? 1. In your internet browser, go to [email or web address]  
2. Click on the First Time User? Click Here link in the Sign In box. You will be redirected to the New Member Sign Up page. 3. Enter your [facility] Access Code exactly as it appears below. You will not need to use this code after youve completed the sign-up process. If you do not sign up before the expiration date, you must request a new code. [facility] Access Code: [email or web address] Expires: 1/18/2020  2:15 PM  
 
4. Enter the last four digits of your Social Security Number (xxxx) and Date of Birth (mm/dd/yyyy) as indicated and click Submit. You will be taken to the next sign-up page. 5. Create a [facility]icory ID. This will be your [facility] login ID and cannot be changed, so think of one that is secure and easy to remember. 6. Create a [facility] password. You can change your password at any time. 7. Enter your Password Reset Question and Answer. This can be used at a later time if you forget your password. 8. Enter your e-mail address. You will receive e-mail notification when new information is available in [address] 9. Click Sign Up. You can now view and download portions of your medical record. 10. Click the Download Summary menu link to download a portable copy of your medical information. Additional Information If you have questions, please visit the Frequently Asked Questions section of the [facility] website at [email or web address] [email or web address] Remember, [facility] is NOT to be used for urgent needs. For medical emergencies, dial 911. Now available from your iPhone and Android! Sincerely, Sofie De Jesus

## 2019-12-06 NOTE — PROGRESS NOTES
Emilia Isaacs is a 68 y.o. female who presents for evaluation of low back pain with left sided sciatica. Last seen by me June 26, 2019 in npv. Was in normal state of health, when she woke up nov 27 morning with low back pain and radiation down left leg. Denies any trauma. Has never had pains in low back before. Went to ed that day, xray of low back was ok. rx given for robaxin, lidoderm patch, medrol and ultram.  Pain has improved considerably, but she is still struggling with the pain. No issues with bowels or bladder. ROS:  Constitutional: negative for fevers, chills, anorexia and weight loss  Eyes:   negative for visual disturbance and irritation  ENT:   negative for tinnitus,sore throat,nasal congestion,ear pain,hoarseness  Respiratory:  negative for cough, hemoptysis, dyspnea,wheezing  CV:   negative for chest pain, palpitations, lower extremity edema  GI:   negative for nausea, vomiting, diarrhea, abdominal pain,melena  Genitourinary: negative for frequency, dysuria and hematuria  Musculoskel: negative for myalgias, arthralgias, muscle weakness, joint pain. ++lbp with left sided sciatica.   Neurological:  negative for headaches, dizziness, focal weakness, numbness  Psychiatric:     Negative for depression or anxiety      Past Medical History:   Diagnosis Date    Cancer ([facility] [address]     Chronic kidney disease        Past Surgical History:   Procedure Laterality Date    HX GYN      hysterectomy     HX TRANSPLANT  04/03/1997    kidney       Family History   Problem Relation Age of Onset    Diabetes Mother        Social History     Socioeconomic History    Marital status:      Spouse name: Not on file    Number of children: Not on file    Years of education: Not on file    Highest education level: Not on file   Occupational History    Not on file   Social Needs    Financial resource strain: Not on file    Food insecurity:     Worry: Not on file     Inability: Not on file   [employer] needs:     Medical: Not on file     Non-medical: Not on file   Tobacco Use    Smoking status: Never Smoker    Smokeless tobacco: Never Used   Substance and Sexual Activity    Alcohol use: Not Currently     Frequency: Never    Drug use: Never    Sexual activity: Not Currently   Lifestyle    Physical activity:     Days per week: Not on file     Minutes per session: Not on file    Stress: Not on file   Relationships    Social connections:     Talks on phone: Not on file     Gets together: Not on file     Attends [employer]ism service: Not on file     Active member of club or organization: Not on file     Attends meetings of clubs or organizations: Not on file     Relationship status: Not on file    Intimate partner violence:     Fear of current or ex partner: Not on file     Emotionally abused: Not on file     Physically abused: Not on file     Forced sexual activity: Not on file   Other Topics Concern    Not on file   Social History Narrative    Not on file            Visit Vitals  /86 (BP 1 Location: Left arm, BP Patient Position: Sitting)   Pulse (!) 102   Temp 98 °F (36.7 °C) (Oral)   Resp 16   Ht 5' 5\" (1.651 m)   Wt 193 lb 9.6 oz (87.8 kg)   SpO2 99%   BMI 32.22 kg/m²       Physical Examination:   General - Well appearing female  HEENT - PERRL, TM no erythema/opacification, normal nasal turbinates, no oropharyngeal erythema or exudate, MMM  Neck - supple, no bruits, no thyroidomegaly, no lymphadenopathy  Pulm - clear to auscultation bilaterally  Cardio - RRR, normal S1 S2, no murmur  Abd - soft, nontender, no masses, no HSM  Extrem - no edema, +2 distal pulses. ++straight leg raising on left. Negative on right. Neuro-  No focal deficits, CN intact     Assessment/Plan:    1.  lbp with left sided sciatica--already finished medrol dose dea, and is on 5 mg prednisone daily already. rx given for valium to use qhs. Continue with lidoderm patch. Add ice 15 minutes bid.   Handout given for stretches. Referral to PT. Has follow up with me in 3 weeks already. If not better by then, will have her see ortho or neurosx. 2. Hx NHL--  3. Hx kidney transplant--on prednisone and cyclosporine  4.   Hx RA--not on any dmards    rtc for regular visit        Joce Menchaca III, DO

## 2019-12-06 NOTE — PROGRESS NOTES
Reviewed record in preparation for visit and have obtained necessary documentation. Identified pt with two pt identifiers(name and ). Chief Complaint   Patient presents with    LOW BACK PAIN     radiating to LE extremities    ED Follow-up       Health Maintenance Due   Topic Date Due    Hepatitis C Screening  1950    DTaP/Tdap/Td series (1 - Tdap) 03/10/1959    Shingrix Vaccine Age 50> (1 of 2) 03/10/1998    FOBT Q 1 YEAR AGE 50-75  03/10/1998    GLAUCOMA SCREENING Q2Y  03/10/2013    Influenza Age 9 to Adult  2019       Ms. Feng Avila has a reminder for a \"due or due soon\" health maintenance. I have asked that she discuss health maintenance topic(s) due with Her  primary care provider. Coordination of Care Questionnaire:  :     1) Have you been to an emergency room, urgent care clinic since your last visit? yes   Hospitalized since your last visit? no             2) Have you seen or consulted any other health care providers outside of [address] [place] [address] since your last visit? no  (Include any pap smears or colon screenings in this section.)    3) Do you have an Advance Directive on file? no    4) Are you interested in receiving information on Advance Directives? NO    Patient is accompanied by self I have received verbal consent from Moiz Dumont to discuss any/all medical information while they are present in the room.

## 2019-12-06 NOTE — PATIENT INSTRUCTIONS

## 2019-12-23 ENCOUNTER — OFFICE VISIT (OUTPATIENT)
Dept: INTERNAL MEDICINE CLINIC | Age: 69
End: 2019-12-23

## 2019-12-23 VITALS
OXYGEN SATURATION: 100 % | TEMPERATURE: 97.6 F | HEART RATE: 99 BPM | DIASTOLIC BLOOD PRESSURE: 85 MMHG | SYSTOLIC BLOOD PRESSURE: 128 MMHG | BODY MASS INDEX: 32.02 KG/M2 | WEIGHT: 192.2 LBS | RESPIRATION RATE: 14 BRPM | HEIGHT: 65 IN

## 2019-12-23 DIAGNOSIS — R73.9 HYPERGLYCEMIA: ICD-10-CM

## 2019-12-23 DIAGNOSIS — M06.9 RHEUMATOID ARTHRITIS, INVOLVING UNSPECIFIED SITE, UNSPECIFIED RHEUMATOID FACTOR PRESENCE: ICD-10-CM

## 2019-12-23 DIAGNOSIS — C85.91 NON-HODGKIN LYMPHOMA OF LYMPH NODES OF NECK, UNSPECIFIED NON-HODGKIN LYMPHOMA TYPE (HCC): ICD-10-CM

## 2019-12-23 DIAGNOSIS — Z94.0 KIDNEY TRANSPLANT RECIPIENT: Primary | ICD-10-CM

## 2019-12-23 NOTE — PROGRESS NOTES
Glenda Wyatt is a 68 y.o. female who presents for evaluation of routine follow up. Last seen by me dec 6, 2019 with lbp and left sided sciatica. Got better with ice, valium, stretches. Feels much better now. No complaints today.       ROS:  Constitutional: negative for fevers, chills, anorexia and weight loss  Eyes:   negative for visual disturbance and irritation  ENT:   negative for tinnitus,sore throat,nasal congestion,ear pain,hoarseness  Respiratory:  negative for cough, hemoptysis, dyspnea,wheezing  CV:   negative for chest pain, palpitations, lower extremity edema  GI:   negative for nausea, vomiting, diarrhea, abdominal pain,melena  Genitourinary: negative for frequency, dysuria and hematuria  Musculoskel: negative for myalgias, arthralgias, back pain, muscle weakness, joint pain  Neurological:  negative for headaches, dizziness, focal weakness, numbness  Psychiatric:     Negative for depression or anxiety      Past Medical History:   Diagnosis Date    [facility]     Chronic kidney disease        Past Surgical History:   Procedure Laterality Date    HX GYN      hysterectomy     HX TRANSPLANT  04/03/1997    kidney       Family History   Problem Relation Age of Onset    Diabetes Mother        Social History     Socioeconomic History    Marital status:      Spouse name: Not on file    Number of children: Not on file    Years of education: Not on file    Highest education level: Not on file   Occupational History    Not on file   Social Needs    Financial resource strain: Not on file    Food insecurity:     Worry: Not on file     Inability: Not on file    Transportation needs:     Medical: Not on file     Non-medical: Not on file   Tobacco Use    Smoking status: Never Smoker    Smokeless tobacco: Never Used   Substance and Sexual Activity    Alcohol use: Not Currently     Frequency: Never    Drug use: Never    Sexual activity: Not Currently   Lifestyle    Physical activity: Days per week: Not on file     Minutes per session: Not on file    Stress: Not on file   Relationships    Social connections:     Talks on phone: Not on file     Gets together: Not on file     Attends Evangelical service: Not on file     Active member of club or organization: Not on file     Attends meetings of clubs or organizations: Not on file     Relationship status: Not on file    Intimate partner violence:     Fear of current or ex partner: Not on file     Emotionally abused: Not on file     Physically abused: Not on file     Forced sexual activity: Not on file   Other Topics Concern    Not on file   Social History Narrative    Not on file            Visit Vitals  /85 (BP 1 Location: Right arm, BP Patient Position: Sitting)   Pulse 99   Temp 97.6 °F (36.4 °C) (Oral)   Resp 14   Ht 5' 5\" (1.651 m)   Wt 192 lb 3.2 oz (87.2 kg)   SpO2 100%   BMI 31.98 kg/m²       Physical Examination:   General - Well appearing female  HEENT - PERRL, TM no erythema/opacification, normal nasal turbinates, no oropharyngeal erythema or exudate, MMM  Neck - supple, no bruits, no thyroidomegaly, no lymphadenopathy  Pulm - clear to auscultation bilaterally  Cardio - RRR, normal S1 S2, no murmur  Abd - soft, nontender, no masses, no HSM  Extrem - no edema, +2 distal pulses  Neuro-  No focal deficits, CN intact     Assessment/Plan:    1. Kidney transplant patient--continue bicarabonate. Check cbc, cmp. Has appt with renal in a few weeks. On cyclosporine and prednisone. 2.  RA--on prednisone. Has never been on any dmards  3. Hx NHL--check cbc  4. Hyperglycemia--check a1c. Has been on prednisone    rx given for tdap. Had colon with dr Noris Liu.   rtc 6 months        Brian Offer III, DO

## 2019-12-23 NOTE — PATIENT INSTRUCTIONS
Office Policies    Phone calls/patient messages:            Please allow up to 24 hours for someone in the office to contact you about your call or message. Be mindful your provider may be out of the office or your message may require further review. We encourage you to use [employer] for your messages as this is a faster, more efficient way to communicate with our office                         Medication Refills:            Prescription medications require 48-72 business hours to process. We encourage you to use [employer] for your refills. For controlled medications: Please allow 72 business hours to process. Certain medications may require you to  a written prescription at our office. NO narcotic/controlled medications will be prescribed after 4pm Monday through Friday or on weekends              Form/Paperwork Completion:            Please note a $25 fee may incur for all paperwork for completed by our providers. We ask that you allow 7-10 business days. Pre-payment is due prior to picking up/faxing the completed form. You may also download your forms to [employer] to have your doctor print off. Learning About Steroids and High Blood Sugar  What are steroids? Steroid medicines (corticosteroids) are often prescribed by doctors to treat many conditions. They help calm down the body's response to inflammation. You may take them for asthma, COPD, back pain, or allergic reactions. They are also used for other conditions such as autoimmune diseases, arthritis, and certain types of cancer. These medicines can be given as pills or injections. The steroids discussed here are not the type of steroids used for body building. What is high blood sugar? Your body turns the food you eat into glucose (sugar), which it uses for energy. That's a good thing. But if your body isn't able to use the sugar right away, it can build up in your blood and cause problems.  When the blood sugar rises to a certain level, it's called high blood sugar. This is also known as hyperglycemia. What effect can steroids have on blood sugar? Steroid medicine has many benefits. But one side effect of steroids is that they can raise your blood sugar level while you take them. In most cases, this is temporary. If you already have diabetes, you may notice that your blood sugars jump higher after you take steroids. Very rarely, taking steroids may lead to a new diagnosis of diabetes. What are the symptoms of high blood sugar? The most common symptoms of high blood sugar include:  · Thirst.  · Frequent urination. · Weight loss. · Blurry vision. How is high blood sugar caused by steroids treated? If your doctor thinks your blood sugar might be too high, you will have a blood test. If your blood sugar is at a harmful level, you may need to take medicine that lowers your blood sugar. After you are finished taking steroids, your blood sugar should go back to its usual level. At that point, you won't need the medicine any longer. If you are taking medicine for another condition, your doctor may make changes to how you take that medicine. Follow-up care is a key part of your treatment and safety. Be sure to make and go to all appointments, and call your doctor if you are having problems. It's also a good idea to know your test results and keep a list of the medicines you take. Where can you learn more? Go to http://erin-demetria.info/. Enter K280 in the search box to learn more about \"Learning About Steroids and High Blood Sugar. \"  Current as of: November 6, 2018  Content Version: 12.2  © 2253-5960 [employer]. Care instructions adapted under license by [employer] (which disclaims liability or warranty for this information).  If you have questions about a medical condition or this instruction, always ask your healthcare professional. Yonis José disclaims any warranty or liability for your use of this information.

## 2019-12-23 NOTE — PROGRESS NOTES
Reviewed record in preparation for visit and have obtained necessary documentation. Identified pt with two pt identifiers(name and ). Chief Complaint   Patient presents with    Back Pain       Health Maintenance Due   Topic Date Due    Hepatitis C Screening  1950    DTaP/Tdap/Td series (1 - Tdap) 03/10/1959    Shingrix Vaccine Age 50> (1 of 2) 03/10/1998    FOBT Q 1 YEAR AGE 50-75  03/10/1998    GLAUCOMA SCREENING Q2Y  03/10/2013       Ms. Genevieve Mahmood has a reminder for a \"due or due soon\" health maintenance. I have asked that she discuss health maintenance topic(s) due with Her  primary care provider. Coordination of Care Questionnaire:  :     1) Have you been to an emergency room, urgent care clinic since your last visit? no   Hospitalized since your last visit? no             2) Have you seen or consulted any other health care providers outside of [address] [place] [address] since your last visit? no  (Include any pap smears or colon screenings in this section.)    3) Do you have an Advance Directive on file? no    4) Are you interested in receiving information on Advance Directives? NO    Patient is accompanied by self I have received verbal consent from Pepe Gonzalez to discuss any/all medical information while they are present in the room.

## 2019-12-30 DIAGNOSIS — G47.00 INSOMNIA, UNSPECIFIED TYPE: ICD-10-CM

## 2019-12-30 RX ORDER — FERROUS FUMARATE 324(106)MG
TABLET ORAL
Qty: 90 TAB | Refills: 3 | Status: SHIPPED | OUTPATIENT
Start: 2019-12-30 | End: 2020-02-14 | Stop reason: SINTOL

## 2019-12-30 RX ORDER — TEMAZEPAM 7.5 MG/1
7.5 CAPSULE ORAL
Qty: 30 CAP | Refills: 5 | Status: SHIPPED | OUTPATIENT
Start: 2019-12-30 | End: 2020-02-14

## 2020-01-27 RX ORDER — PREDNISONE 5 MG/1
TABLET ORAL
Qty: 90 TAB | Refills: 3 | Status: SHIPPED | OUTPATIENT
Start: 2020-01-27 | End: 2020-10-21 | Stop reason: SDUPTHER

## 2020-01-29 LAB
ALBUMIN SERPL-MCNC: 3.6 G/DL (ref 3.7–4.7)
ALBUMIN/GLOB SERPL: 1.6 {RATIO} (ref 1.2–2.2)
ALP SERPL-CCNC: 130 IU/L (ref 39–117)
ALT SERPL-CCNC: 14 IU/L (ref 0–32)
AST SERPL-CCNC: 18 IU/L (ref 0–40)
BASOPHILS # BLD AUTO: 0 X10E3/UL (ref 0–0.2)
BASOPHILS NFR BLD AUTO: 0 %
BILIRUB SERPL-MCNC: 0.7 MG/DL (ref 0–1.2)
BUN SERPL-MCNC: 14 MG/DL (ref 8–27)
BUN/CREAT SERPL: 15 (ref 12–28)
CALCIUM SERPL-MCNC: 9.1 MG/DL (ref 8.7–10.3)
CHLORIDE SERPL-SCNC: 106 MMOL/L (ref 96–106)
CHOLEST SERPL-MCNC: 138 MG/DL (ref 100–199)
CO2 SERPL-SCNC: 21 MMOL/L (ref 20–29)
CREAT SERPL-MCNC: 0.91 MG/DL (ref 0.57–1)
EOSINOPHIL # BLD AUTO: 0.1 X10E3/UL (ref 0–0.4)
EOSINOPHIL NFR BLD AUTO: 1 %
ERYTHROCYTE [DISTWIDTH] IN BLOOD BY AUTOMATED COUNT: 12.4 % (ref 11.7–15.4)
EST. AVERAGE GLUCOSE BLD GHB EST-MCNC: 94 MG/DL
GLOBULIN SER CALC-MCNC: 2.3 G/DL (ref 1.5–4.5)
GLUCOSE SERPL-MCNC: 97 MG/DL (ref 65–99)
HBA1C MFR BLD: 4.9 % (ref 4.8–5.6)
HCT VFR BLD AUTO: 39.6 % (ref 34–46.6)
HCV AB S/CO SERPL IA: <0.1 S/CO RATIO (ref 0–0.9)
HCV AB SERPL QL IA: NORMAL
HDLC SERPL-MCNC: 49 MG/DL
HGB BLD-MCNC: 13.4 G/DL (ref 11.1–15.9)
IMM GRANULOCYTES # BLD AUTO: 0 X10E3/UL (ref 0–0.1)
IMM GRANULOCYTES NFR BLD AUTO: 0 %
LDLC SERPL CALC-MCNC: 68 MG/DL (ref 0–99)
LYMPHOCYTES # BLD AUTO: 1.3 X10E3/UL (ref 0.7–3.1)
LYMPHOCYTES NFR BLD AUTO: 22 %
MCH RBC QN AUTO: 34.6 PG (ref 26.6–33)
MCHC RBC AUTO-ENTMCNC: 33.8 G/DL (ref 31.5–35.7)
MCV RBC AUTO: 102 FL (ref 79–97)
MONOCYTES # BLD AUTO: 0.4 X10E3/UL (ref 0.1–0.9)
MONOCYTES NFR BLD AUTO: 6 %
NEUTROPHILS # BLD AUTO: 4 X10E3/UL (ref 1.4–7)
NEUTROPHILS NFR BLD AUTO: 71 %
PLATELET # BLD AUTO: 182 X10E3/UL (ref 150–450)
POTASSIUM SERPL-SCNC: 5.2 MMOL/L (ref 3.5–5.2)
PROT SERPL-MCNC: 5.9 G/DL (ref 6–8.5)
RBC # BLD AUTO: 3.87 X10E6/UL (ref 3.77–5.28)
SODIUM SERPL-SCNC: 139 MMOL/L (ref 134–144)
TRIGL SERPL-MCNC: 103 MG/DL (ref 0–149)
VLDLC SERPL CALC-MCNC: 21 MG/DL (ref 5–40)
WBC # BLD AUTO: 5.7 X10E3/UL (ref 3.4–10.8)

## 2020-01-29 NOTE — PROGRESS NOTES
Spoke with patient using two identifiers. Patient was informed of recent labs. Patient  Verbalized understanding.

## 2020-02-03 ENCOUNTER — TELEPHONE (OUTPATIENT)
Dept: INTERNAL MEDICINE CLINIC | Age: 70
End: 2020-02-03

## 2020-02-03 NOTE — TELEPHONE ENCOUNTER
----- Message from Mike Alejo sent at 2/3/2020 10:15 AM EST -----  Regarding: Dr. Tamara Cooper: [phone]  Caller's first and last name: n/a  Reason for call: Pt stated that Temazepam medication is not working for her and with her new insurance she cannot afford the medication.  Please verify with the pt on this matter   Callback required yes/no and why: yes  Best contact number(s): [phone]  Details to clarify the request: n/a     Message copied/pasted from Shira Remy

## 2020-02-05 NOTE — TELEPHONE ENCOUNTER
#[phone]  Pt states that the Temazepam is no longer working for her to get to sleep. She will take a pill and 3 hours later still be awake. Pt would like something stronger called in for her as soon as you can. Please call with any questions. Pt states the Temazepam is also $155 per month with insurance. Is there something less expensive pt can take?

## 2020-02-06 NOTE — TELEPHONE ENCOUNTER
Jose Henriquez III, DO  You 45 minutes ago (1:06 PM)     Don't have any other suggestions other than melatonin. Routing comment      Called, left vm for pt to return call to office.

## 2020-02-10 ENCOUNTER — OFFICE VISIT (OUTPATIENT)
Dept: INTERNAL MEDICINE CLINIC | Age: 70
End: 2020-02-10

## 2020-02-10 VITALS
SYSTOLIC BLOOD PRESSURE: 115 MMHG | HEIGHT: 65 IN | OXYGEN SATURATION: 99 % | BODY MASS INDEX: 31.72 KG/M2 | DIASTOLIC BLOOD PRESSURE: 79 MMHG | RESPIRATION RATE: 16 BRPM | TEMPERATURE: 98 F | WEIGHT: 190.4 LBS | HEART RATE: 112 BPM

## 2020-02-10 DIAGNOSIS — R00.0 TACHYCARDIA: ICD-10-CM

## 2020-02-10 DIAGNOSIS — R60.9 EDEMA, UNSPECIFIED TYPE: Primary | ICD-10-CM

## 2020-02-10 NOTE — PROGRESS NOTES
SUBJECTIVE  Ms. Rolan Desai presents today acutely for     Chief Complaint   Patient presents with    Swelling     pt here today concerned of swelling in legs/feet- pt is a kindey transplant pt     The swelling is worse as the day progresses, better overnight. She is concerned about kidney infection. \"I have been having a lot of leaking\" of urine. No frequency--uses bathroom about TID. OBJECTIVE  Visit Vitals  /79 (BP 1 Location: Left arm, BP Patient Position: Sitting)   Pulse (!) 112   Temp 98 °F (36.7 °C) (Oral)   Resp 16   Ht 5' 5\" (1.651 m)   Wt 190 lb 6.4 oz (86.4 kg)   SpO2 99%   BMI 31.68 kg/m²     Gen: Pleasant 68 y.o.  female in NAD.   HEENT: PERRLA. EOMI. OP moist and pink.  Neck: Supple.  No LAD.  HEART: rapid and irregular.   LUNGS: CTAB No W/R.   ABDOMEN: S, NT, ND, BS+.   EXTREMITIES: Warm. Trace edema at best.     ASSESSMENT / PLAN    ICD-10-CM ICD-9-CM    1. Edema, unspecified type--not apparent on exam today. R60.9 782.3 Probably some degree of venous insufficiency--elevation+compression. 2. Tachycardia R00.0 785.0 AMB POC EKG ROUTINE W/ 12 LEADS, INTER & REP      EKG, 12 LEAD, INITIAL      CANCELED: AMB POC EKG ROUTINE W/ 12 LEADS, INTER & REP       I have reviewed with the patient details of the assessment and plan and all questions were answered. Relevant patient education was performed. Follow-up and Dispositions    · Return if symptoms worsen or fail to improve.

## 2020-02-10 NOTE — PATIENT INSTRUCTIONS
Office Policies    Phone calls/patient messages:            Please allow up to 24 hours for someone in the office to contact you about your call or message. Be mindful your provider may be out of the office or your message may require further review. We encourage you to use [employer] for your messages as this is a faster, more efficient way to communicate with our office                         Medication Refills:            Prescription medications require 48-72 business hours to process. We encourage you to use [employer] for your refills. For controlled medications: Please allow 72 business hours to process. Certain medications may require you to  a written prescription at our office. NO narcotic/controlled medications will be prescribed after 4pm Monday through Friday or on weekends              Form/Paperwork Completion:            Please note a $25 fee may incur for all paperwork for completed by our providers. We ask that you allow 7-10 business days. Pre-payment is due prior to picking up/faxing the completed form. You may also download your forms to [employer] to have your doctor print off.      1. Have you been to the ER, urgent care clinic since your last visit? Hospitalized since your last visit?no    2. Have you seen or consulted any other health care providers outside of the [address] [place] [address] since your last visit? Include any pap smears or colon screening.  no

## 2020-02-11 ENCOUNTER — TELEPHONE (OUTPATIENT)
Dept: INTERNAL MEDICINE CLINIC | Age: 70
End: 2020-02-11

## 2020-02-11 NOTE — TELEPHONE ENCOUNTER
Spoke with Carlos Manuel. Appointment scheduled for 2/14 @ 845 with Dr. Yohana Browne verbalized understanding of information discussed w/ no further questions at this time.

## 2020-02-11 NOTE — TELEPHONE ENCOUNTER
#[phone] states pt has foot and leg swelling. Pt also has very bad back pain. Pt would only like to see Dr. Rommel Coleman. Please call to schedule pt as soon as possible.

## 2020-02-14 ENCOUNTER — OFFICE VISIT (OUTPATIENT)
Dept: INTERNAL MEDICINE CLINIC | Age: 70
End: 2020-02-14

## 2020-02-14 VITALS
OXYGEN SATURATION: 97 % | HEART RATE: 93 BPM | HEIGHT: 65 IN | RESPIRATION RATE: 16 BRPM | SYSTOLIC BLOOD PRESSURE: 103 MMHG | TEMPERATURE: 97.1 F | DIASTOLIC BLOOD PRESSURE: 62 MMHG | BODY MASS INDEX: 31.32 KG/M2 | WEIGHT: 188 LBS

## 2020-02-14 DIAGNOSIS — G47.00 INSOMNIA, UNSPECIFIED TYPE: ICD-10-CM

## 2020-02-14 DIAGNOSIS — K64.9 HEMORRHOIDS, UNSPECIFIED HEMORRHOID TYPE: ICD-10-CM

## 2020-02-14 DIAGNOSIS — M06.9 RHEUMATOID ARTHRITIS, INVOLVING UNSPECIFIED SITE, UNSPECIFIED RHEUMATOID FACTOR PRESENCE: ICD-10-CM

## 2020-02-14 DIAGNOSIS — C85.91 NON-HODGKIN LYMPHOMA OF LYMPH NODES OF NECK, UNSPECIFIED NON-HODGKIN LYMPHOMA TYPE (HCC): ICD-10-CM

## 2020-02-14 DIAGNOSIS — R60.0 BILATERAL LEG EDEMA: ICD-10-CM

## 2020-02-14 DIAGNOSIS — R33.9 URINE RETENTION: Primary | ICD-10-CM

## 2020-02-14 LAB
BILIRUB UR QL STRIP: NORMAL
GLUCOSE UR-MCNC: NEGATIVE MG/DL
KETONES P FAST UR STRIP-MCNC: NEGATIVE MG/DL
PH UR STRIP: 5.5 [PH] (ref 4.6–8)
PROT UR QL STRIP: NEGATIVE
SP GR UR STRIP: 1.03 (ref 1–1.03)
UA UROBILINOGEN AMB POC: NORMAL (ref 0.2–1)
URINALYSIS CLARITY POC: NORMAL
URINALYSIS COLOR POC: YELLOW
URINE BLOOD POC: NORMAL
URINE LEUKOCYTES POC: NORMAL
URINE NITRITES POC: NEGATIVE

## 2020-02-14 RX ORDER — TRAZODONE HYDROCHLORIDE 50 MG/1
50 TABLET ORAL
Qty: 90 TAB | Refills: 3 | Status: SHIPPED | OUTPATIENT
Start: 2020-02-14 | End: 2020-05-07

## 2020-02-14 NOTE — PATIENT INSTRUCTIONS
Office Policies    Phone calls/patient messages:            Please allow up to 24 hours for someone in the office to contact you about your call or message. Be mindful your provider may be out of the office or your message may require further review. We encourage you to use [employer] for your messages as this is a faster, more efficient way to communicate with our office                         Medication Refills:            Prescription medications require 48-72 business hours to process. We encourage you to use [employer] for your refills. For controlled medications: Please allow 72 business hours to process. Certain medications may require you to  a written prescription at our office. NO narcotic/controlled medications will be prescribed after 4pm Monday through Friday or on weekends              Form/Paperwork Completion:            Please note a $25 fee may incur for all paperwork for completed by our providers. We ask that you allow 7-10 business days. Pre-payment is due prior to picking up/faxing the completed form. You may also download your forms to [employer] to have your doctor print off. Stop iron pills. If hemorrhoids persist, let me know and will have you see gi to get it banded. Your blood counts were great in jan. Cut back on dose of elavil/amitryptille. Cut dose in half for next 30 days, then stop. Wear compression stockings during the day, to help prevent leg swelling.

## 2020-02-14 NOTE — PROGRESS NOTES
Reviewed record in preparation for visit and have obtained necessary documentation. Identified pt with two pt identifiers(name and ). Chief Complaint   Patient presents with    Urinary Retention     x3 weeks; x2-3x per week       Health Maintenance Due   Topic Date Due    DTaP/Tdap/Td series (1 - Tdap) 03/10/1959    Shingrix Vaccine Age 50> (1 of 2) 03/10/1998    FOBT Q1Y Age 50-75  03/10/1998    GLAUCOMA SCREENING Q2Y  03/10/2013       Ms. Shimon Nolan has a reminder for a \"due or due soon\" health maintenance. I have asked that she discuss health maintenance topic(s) due with Her  primary care provider. Coordination of Care Questionnaire:  :     1) Have you been to an emergency room, urgent care clinic since your last visit? no   Hospitalized since your last visit? no             2) Have you seen or consulted any other health care providers outside of [address] [place] [address] since your last visit? no  (Include any pap smears or colon screenings in this section.)    3) Do you have an Advance Directive on file? no    4) Are you interested in receiving information on Advance Directives? NO    Patient is accompanied by self I have received verbal consent from DuaneTrusight Solders to discuss any/all medical information while they are present in the room.

## 2020-02-14 NOTE — PROGRESS NOTES
Jia Tesfaye is a 68 y.o. female who presents for evaluation of numerous complaints. Last seen by me dec 12, 2019. Saw dr shukla here feb 10 for leg edema. She was not impressed by him. Complains today of some urine retention, insomnia, and intermittent leg edema over past 3 weeks or so. Set to meet with dentist next week, with plans of having teeth extracted soon.       ROS:  Constitutional: negative for fevers, chills, anorexia and weight loss  Eyes:   negative for visual disturbance and irritation  ENT:   negative for tinnitus,sore throat,nasal congestion,ear pain,hoarseness  Respiratory:  negative for cough, hemoptysis, dyspnea,wheezing  CV:   negative for chest pain, palpitations, lower extremity edema  GI:   negative for nausea, vomiting, diarrhea, abdominal pain,melena  Genitourinary: negative for frequency, dysuria and hematuria  Musculoskel: negative for myalgias, arthralgias, back pain, muscle weakness, joint pain  Neurological:  negative for headaches, dizziness, focal weakness, numbness  Psychiatric:     Negative for depression or anxiety      Past Medical History:   Diagnosis Date    [facility] ([facility]     Chronic kidney disease        Past Surgical History:   Procedure Laterality Date    HX GYN      hysterectomy     HX TRANSPLANT  04/03/1997    kidney       Family History   Problem Relation Age of Onset    Diabetes Mother        Social History     Socioeconomic History    Marital status:      Spouse name: Not on file    Number of children: Not on file    Years of education: Not on file    Highest education level: Not on file   Occupational History    Not on file   Social Needs    Financial resource strain: Not on file    Food insecurity:     Worry: Not on file     Inability: Not on file    Transportation needs:     Medical: Not on file     Non-medical: Not on file   Tobacco Use    Smoking status: Never Smoker    Smokeless tobacco: Never Used   Substance and Sexual Activity  Alcohol use: Not Currently     Frequency: Never    Drug use: Never    Sexual activity: Not Currently   Lifestyle    Physical activity:     Days per week: Not on file     Minutes per session: Not on file    Stress: Not on file   Relationships    Social connections:     Talks on phone: Not on file     Gets together: Not on file     Attends Anabaptist service: Not on file     Active member of club or organization: Not on file     Attends meetings of clubs or organizations: Not on file     Relationship status: Not on file    Intimate partner violence:     Fear of current or ex partner: Not on file     Emotionally abused: Not on file     Physically abused: Not on file     Forced sexual activity: Not on file   Other Topics Concern    Not on file   Social History Narrative    Not on file            Visit Vitals  /62 (BP 1 Location: Right arm, BP Patient Position: Sitting)   Pulse 93   Temp 97.1 °F (36.2 °C) (Oral)   Resp 16   Ht 5' 5\" (1.651 m)   Wt 188 lb (85.3 kg)   SpO2 97%   BMI 31.28 kg/m²       Physical Examination:   General - Well appearing female  HEENT - PERRL, TM no erythema/opacification, normal nasal turbinates, no oropharyngeal erythema or exudate, MMM  Neck - supple, no bruits, no thyroidomegaly, no lymphadenopathy  Pulm - clear to auscultation bilaterally  Cardio - RRR, normal S1 S2, no murmur  Abd - soft, nontender, no masses, no HSM  Extrem -trace, nonpitting edema, +2 distal pulses  Neuro-  No focal deficits, CN intact     Assessment/Plan:    1. Urine retention--ua not clean, but only with trace leuk. Will send for cx. Wean off elavil over next month. 2.  Bleeding hemorrhoid--made worse by increased bowels that she attributes to her iron pills  3. Insomnia--restoril not effective, rx given for trazodone  4. Trace ankle edema--suggested compression stockings. Check echo. Not enough to warrant diuretics  5. RA--on prednisone and cyclosporine  6. Hx NHL--  7.   Hx kidney transplant--on prednisone, cyclosporine    rtc 3 months        Juan Pink III, DO

## 2020-02-16 LAB — BACTERIA UR CULT: ABNORMAL

## 2020-02-17 ENCOUNTER — HOSPITAL ENCOUNTER (OUTPATIENT)
Dept: INFUSION THERAPY | Age: 70
Discharge: HOME | End: 2020-02-17
Payer: MEDICARE

## 2020-02-17 VITALS
DIASTOLIC BLOOD PRESSURE: 83 MMHG | RESPIRATION RATE: 18 BRPM | SYSTOLIC BLOOD PRESSURE: 138 MMHG | OXYGEN SATURATION: 96 % | HEART RATE: 82 BPM | TEMPERATURE: 97.6 F

## 2020-02-17 LAB
ALBUMIN SERPL-MCNC: 3 G/DL (ref 3.5–5)
ANION GAP SERPL CALC-SCNC: 6 MMOL/L (ref 5–15)
BUN SERPL-MCNC: 13 MG/DL (ref 6–20)
BUN/CREAT SERPL: 17 (ref 12–20)
CALCIUM SERPL-MCNC: 8.2 MG/DL (ref 8.5–10.1)
CHLORIDE SERPL-SCNC: 112 MMOL/L (ref 97–108)
CO2 SERPL-SCNC: 24 MMOL/L (ref 21–32)
CREAT SERPL-MCNC: 0.76 MG/DL (ref 0.55–1.02)
GLUCOSE SERPL-MCNC: 100 MG/DL (ref 65–100)
MAGNESIUM SERPL-MCNC: 1.5 MG/DL (ref 1.6–2.4)
PHOSPHATE SERPL-MCNC: 2.3 MG/DL (ref 2.6–4.7)
PHOSPHATE SERPL-MCNC: 2.4 MG/DL (ref 2.6–4.7)
POTASSIUM SERPL-SCNC: 3.6 MMOL/L (ref 3.5–5.1)
SODIUM SERPL-SCNC: 142 MMOL/L (ref 136–145)

## 2020-02-17 PROCEDURE — 74011250636 HC RX REV CODE- 250/636: Performed by: INTERNAL MEDICINE

## 2020-02-17 PROCEDURE — 83735 ASSAY OF MAGNESIUM: CPT

## 2020-02-17 PROCEDURE — 80069 RENAL FUNCTION PANEL: CPT

## 2020-02-17 PROCEDURE — 96372 THER/PROPH/DIAG INJ SC/IM: CPT

## 2020-02-17 PROCEDURE — 84100 ASSAY OF PHOSPHORUS: CPT

## 2020-02-17 PROCEDURE — 36415 COLL VENOUS BLD VENIPUNCTURE: CPT

## 2020-02-17 RX ORDER — NITROFURANTOIN 25; 75 MG/1; MG/1
100 CAPSULE ORAL 2 TIMES DAILY
Qty: 10 CAP | Refills: 0 | Status: SHIPPED | OUTPATIENT
Start: 2020-02-17 | End: 2020-02-22

## 2020-02-17 RX ADMIN — DENOSUMAB 60 MG: 60 INJECTION SUBCUTANEOUS at 11:03

## 2020-02-17 NOTE — PROGRESS NOTES
Called, spoke to pt. Two identifiers confirmed. Pt notified of results/recommendations per Dr. Soraya Coles. Pt verbalized understanding of information discussed w/ no further questions at this time.

## 2020-02-17 NOTE — PROGRESS NOTES
[address] [facility]"" @ [facility] VISIT NOTE    3374 Patient arrives for Labs & Prolia Injection without acute problems. Please see connect care for complete assessment and education provided. Vital signs stable throughout and prior to discharge, Pt. Tolerated treatment well and discharged without incident. Patient/parent is aware of next [facility] appointment on 8/17/2020. Appointment card given to patient/parents. Medications Verified by Radha Boyce RN via [employer]:  1. Prolia 60mg SQ    VITAL SIGNS   Patient Vitals for the past 12 hrs:   Temp Pulse Resp BP SpO2   02/17/20 0949 97.6 °F (36.4 °C) 82 18 138/83 96 %       LAB WORK Lab results pending, please see Connect Care for results.   Recent Results (from the past 12 hour(s))   RENAL FUNCTION PANEL    Collection Time: 02/17/20  9:44 AM   Result Value Ref Range    Sodium 142 136 - 145 mmol/L    Potassium 3.6 3.5 - 5.1 mmol/L    Chloride 112 (H) 97 - 108 mmol/L    CO2 24 21 - 32 mmol/L    Anion gap 6 5 - 15 mmol/L    Glucose 100 65 - 100 mg/dL    BUN 13 6 - 20 MG/DL    Creatinine 0.76 0.55 - 1.02 MG/DL    BUN/Creatinine ratio 17 12 - 20      GFR est AA >60 >60 ml/min/1.73m2    GFR est non-AA >60 >60 ml/min/1.73m2    Calcium 8.2 (L) 8.5 - 10.1 MG/DL    Phosphorus 2.3 (L) 2.6 - 4.7 MG/DL    Albumin 3.0 (L) 3.5 - 5.0 g/dL   MAGNESIUM    Collection Time: 02/17/20  9:44 AM   Result Value Ref Range    Magnesium 1.5 (L) 1.6 - 2.4 mg/dL   PHOSPHORUS    Collection Time: 02/17/20  9:44 AM   Result Value Ref Range    Phosphorus 2.4 (L) 2.6 - 4.7 MG/DL

## 2020-02-20 ENCOUNTER — HOSPITAL ENCOUNTER (OUTPATIENT)
Dept: NON INVASIVE DIAGNOSTICS | Age: 70
Discharge: HOME | End: 2020-02-20
Attending: INTERNAL MEDICINE
Payer: MEDICARE

## 2020-02-20 VITALS
BODY MASS INDEX: 31.33 KG/M2 | HEIGHT: 65 IN | WEIGHT: 188.05 LBS | DIASTOLIC BLOOD PRESSURE: 79 MMHG | SYSTOLIC BLOOD PRESSURE: 115 MMHG

## 2020-02-20 DIAGNOSIS — R60.0 BILATERAL LEG EDEMA: ICD-10-CM

## 2020-02-20 LAB
AV VELOCITY RATIO: 0.78
ECHO AV AREA PEAK VELOCITY: 2.8 CM2
ECHO AV PEAK GRADIENT: 4.4 MMHG
ECHO AV PEAK VELOCITY: 104.5 CM/S
ECHO EST RA PRESSURE: 10 MMHG
ECHO LA AREA 4C: 14.9 CM2
ECHO LA VOL 4C: 35.54 ML (ref 22–52)
ECHO LA VOLUME INDEX A4C: 18.44 ML/M2 (ref 16–28)
ECHO LV E' LATERAL VELOCITY: 7.95 CM/S
ECHO LV E' SEPTAL VELOCITY: 5.32 CM/S
ECHO LV EDV A4C: 45.4 ML
ECHO LV EDV INDEX A4C: 23.6 ML/M2
ECHO LV EJECTION FRACTION A4C: 38 %
ECHO LV ESV A4C: 28 ML
ECHO LV ESV INDEX A4C: 14.5 ML/M2
ECHO LV INTERNAL DIMENSION DIASTOLIC MMODE: 6.06 CM
ECHO LV INTERNAL DIMENSION SYSTOLIC MMODE: 3.37 CM
ECHO LV IVSD MMODE: 1.62 CM
ECHO LV IVSS MMODE: 2.56 CM
ECHO LV POSTERIOR WALL DIASTOLIC MMODE: 1.56 CM
ECHO LV POSTERIOR WALL SYSTOLIC MMODE: 2.82 CM
ECHO LVOT DIAM: 2.12 CM
ECHO LVOT PEAK GRADIENT: 2.7 MMHG
ECHO LVOT PEAK VELOCITY: 81.99 CM/S
ECHO MV A VELOCITY: 94.31 CM/S
ECHO MV E DECELERATION TIME (DT): 139.8 MS
ECHO MV E VELOCITY: 54.94 CM/S
ECHO MV E/A RATIO: 0.58
ECHO MV E/E' LATERAL: 6.91
ECHO MV E/E' RATIO (AVERAGED): 8.62
ECHO MV E/E' SEPTAL: 10.33
ECHO PULMONARY ARTERY SYSTOLIC PRESSURE (PASP): 34.7 MMHG
ECHO PV MAX VELOCITY: 60.03 CM/S
ECHO PV PEAK GRADIENT: 1.4 MMHG
ECHO RIGHT VENTRICULAR SYSTOLIC PRESSURE (RVSP): 34.7 MMHG
ECHO RV INTERNAL DIMENSION: 2.55 CM
ECHO TV REGURGITANT MAX VELOCITY: 248.24 CM/S
ECHO TV REGURGITANT PEAK GRADIENT: 24.7 MMHG
LVFS: 44.39 %
MV DEC SLOPE: 3.93
PULMONARY ARTERY END DIASTOLIC PRESSURE: 12.9 MMHG
PULMONARY ARTERY MEAN PRESURE: 20.2 MMHG
PV END DIASTOLIC VELOCITY: 0.9 MMHG

## 2020-02-20 PROCEDURE — 93306 TTE W/DOPPLER COMPLETE: CPT

## 2020-02-21 NOTE — PROGRESS NOTES
Called, spoke to pt. Two identifiers confirmed. Pt notified of results/recommendations per Dr. Anjelica Stephen. Pt verbalized understanding of information discussed w/ no further questions at this time.

## 2020-02-24 ENCOUNTER — TELEPHONE (OUTPATIENT)
Dept: INTERNAL MEDICINE CLINIC | Age: 70
End: 2020-02-24

## 2020-02-24 ENCOUNTER — OFFICE VISIT (OUTPATIENT)
Dept: INTERNAL MEDICINE CLINIC | Age: 70
End: 2020-02-24

## 2020-02-24 VITALS
OXYGEN SATURATION: 97 % | SYSTOLIC BLOOD PRESSURE: 103 MMHG | HEART RATE: 86 BPM | HEIGHT: 65 IN | WEIGHT: 186 LBS | RESPIRATION RATE: 16 BRPM | TEMPERATURE: 97.9 F | DIASTOLIC BLOOD PRESSURE: 74 MMHG | BODY MASS INDEX: 30.99 KG/M2

## 2020-02-24 DIAGNOSIS — J40 BRONCHITIS: Primary | ICD-10-CM

## 2020-02-24 LAB
FLUAV+FLUBV AG NOSE QL IA.RAPID: NEGATIVE POS/NEG
FLUAV+FLUBV AG NOSE QL IA.RAPID: NEGATIVE POS/NEG
VALID INTERNAL CONTROL?: YES

## 2020-02-24 RX ORDER — BENZONATATE 200 MG/1
200 CAPSULE ORAL
Qty: 30 CAP | Refills: 0 | Status: SHIPPED | OUTPATIENT
Start: 2020-02-24 | End: 2020-03-02

## 2020-02-24 RX ORDER — OSELTAMIVIR PHOSPHATE 75 MG/1
75 CAPSULE ORAL 2 TIMES DAILY
Qty: 10 CAP | Refills: 0 | Status: SHIPPED | OUTPATIENT
Start: 2020-02-24 | End: 2020-02-29

## 2020-02-24 NOTE — PROGRESS NOTES
HISTORY OF PRESENT ILLNESS  Lesli Mujica is a 68 y.o. female. HPI   Pt normally follows with Dr. Danielle Gar (PCP). Pt is here for acute care. Pt c/o cough body aches, headache, wheezing   x  A few days   States this got worse over the weekend   Possible fevers, not documented, feeling hot and cold   Denies ear pain, sore throat   Pt is on prednisone chronically after kidney transplant   She states her grand kids have had colds, no dx of flu   Has taken robitussin cough syrup and mucinex   Pt's daughter states she has been having a hard time hearing since cold  Will get rapid flu test - neg     Patient Active Problem List    Diagnosis Date Noted    Rheumatoid arthritis ([facility] 12/06/2019    NHL (non-Hodgkin's lymphoma) ([facility] 12/06/2019    Kidney transplant recipient 12/06/2019    Obesity (BMI 30-39.9) 06/26/2019    Other insomnia 06/26/2019     Current Outpatient Medications   Medication Sig Dispense Refill    traZODone (DESYREL) 50 mg tablet Take 1 Tab by mouth nightly. 90 Tab 3    predniSONE (DELTASONE) 5 mg tablet TAKE 1 TABLET BY MOUTH ONCE DAILY 90 Tab 3    sodium bicarbonate 650 mg tablet Take 1 Tab by mouth four (4) times daily. 350 Tab 3    ciprofloxacin HCl (CIPRO) 500 mg tablet Take 500 mg by mouth every Monday, Wednesday, Friday.  cycloSPORINE (SANDIMMUNE) 25 mg capsule Take 25 mg by mouth two (2) times a day. Take 3 tablets 2 times a day      calcium-cholecalciferol, d3, (CALCIUM 600 + D) 600-125 mg-unit tab Take 600 mg by mouth two (2) times a day.        Past Surgical History:   Procedure Laterality Date    HX GYN      hysterectomy     HX TRANSPLANT  04/03/1997    kidney      Lab Results   Component Value Date/Time    WBC 5.7 01/28/2020 08:07 AM    HGB 13.4 01/28/2020 08:07 AM    HCT 39.6 01/28/2020 08:07 AM    PLATELET 137 73/61/6733 08:07 AM     (H) 01/28/2020 08:07 AM     Lab Results   Component Value Date/Time    Cholesterol, total 138 01/28/2020 08:07 AM    HDL Cholesterol 49 01/28/2020 08:07 AM    LDL, calculated 68 01/28/2020 08:07 AM    Triglyceride 103 01/28/2020 08:07 AM     Lab Results   Component Value Date/Time    GFR est non-AA >60 02/17/2020 09:44 AM    GFR est AA >60 02/17/2020 09:44 AM    Creatinine 0.76 02/17/2020 09:44 AM    BUN 13 02/17/2020 09:44 AM    Sodium 142 02/17/2020 09:44 AM    Potassium 3.6 02/17/2020 09:44 AM    Chloride 112 (H) 02/17/2020 09:44 AM    CO2 24 02/17/2020 09:44 AM    Magnesium 1.5 (L) 02/17/2020 09:44 AM    Phosphorus 2.3 (L) 02/17/2020 09:44 AM    Phosphorus 2.4 (L) 02/17/2020 09:44 AM        Review of Systems   Constitutional: Positive for malaise/fatigue. Negative for chills and fever. HENT: Positive for congestion. Negative for ear pain and sore throat. Respiratory: Positive for cough and wheezing. Negative for shortness of breath. Cardiovascular: Negative for chest pain. Neurological: Positive for headaches. Physical Exam  Constitutional:       General: She is not in acute distress. Appearance: She is well-developed. She is not diaphoretic. HENT:      Head: Normocephalic and atraumatic. Right Ear: Tympanic membrane, ear canal and external ear normal.      Left Ear: Tympanic membrane, ear canal and external ear normal.      Mouth/Throat:      Mouth: Mucous membranes are dry. Pharynx: No oropharyngeal exudate or posterior oropharyngeal erythema. Eyes:      General:         Right eye: No discharge. Left eye: No discharge. Conjunctiva/sclera: Conjunctivae normal.   Neck:      Musculoskeletal: Normal range of motion and neck supple. Cardiovascular:      Rate and Rhythm: Normal rate and regular rhythm. Heart sounds: Normal heart sounds. No murmur. No friction rub. No gallop. Pulmonary:      Effort: Pulmonary effort is normal. No respiratory distress. Breath sounds: Normal breath sounds. No wheezing or rales. Chest:      Chest wall: No tenderness.    Musculoskeletal: Normal range of motion. General: No tenderness or deformity. Lymphadenopathy:      Cervical: No cervical adenopathy. Skin:     General: Skin is warm and dry. Coloration: Skin is not pale. Findings: No erythema or rash. Neurological:      General: No focal deficit present. Mental Status: She is alert and oriented to person, place, and time. Coordination: Coordination normal.   Psychiatric:         Mood and Affect: Mood normal.         Behavior: Behavior normal.         ASSESSMENT and PLAN    ICD-10-CM ICD-9-CM    1. Bronchitis                Pt was sxs c/w flu and exposure to young children who were recently ill sxs started in the last 2 days given that we are in high flu season despite neg flu test will tx as flu and will give tamiflu for 5 days tessalon for cough pt will contact office if not feeling better by end of the week     --would consider abx, steroid if progressive wheeze J40 490 AMB POC RAPID INFLUENZA TEST            Scribed by [employer] of [address] [place] [address] [address], as dictated by Dr. Rodrigue Zhou. Current diagnosis and concerns discussed with pt at length. Pt understands risks and benefits or current treatment plan and medications, and accepts the treatment and medication with any possible risks. Pt asks appropriate questions, which were answered. Pt was instructed to call with any concerns or problems. I have reviewed the note documented by the scribe. The services provided are my own.   The documentation is accurate

## 2020-03-10 ENCOUNTER — TELEPHONE (OUTPATIENT)
Dept: INTERNAL MEDICINE CLINIC | Age: 70
End: 2020-03-10

## 2020-03-10 ENCOUNTER — OFFICE VISIT (OUTPATIENT)
Dept: INTERNAL MEDICINE CLINIC | Age: 70
End: 2020-03-10

## 2020-03-10 VITALS
SYSTOLIC BLOOD PRESSURE: 104 MMHG | WEIGHT: 184.4 LBS | BODY MASS INDEX: 30.72 KG/M2 | DIASTOLIC BLOOD PRESSURE: 73 MMHG | HEIGHT: 65 IN | TEMPERATURE: 98.1 F | HEART RATE: 99 BPM | OXYGEN SATURATION: 98 % | RESPIRATION RATE: 16 BRPM

## 2020-03-10 DIAGNOSIS — H91.91 DECREASED HEARING, RIGHT: ICD-10-CM

## 2020-03-10 DIAGNOSIS — H93.A1 PULSATILE TINNITUS OF RIGHT EAR: Primary | ICD-10-CM

## 2020-03-10 DIAGNOSIS — R26.89 IMBALANCE: ICD-10-CM

## 2020-03-10 RX ORDER — PREDNISONE 20 MG/1
TABLET ORAL
Qty: 12 TAB | Refills: 0 | Status: SHIPPED | OUTPATIENT
Start: 2020-03-10 | End: 2020-06-29 | Stop reason: ALTCHOICE

## 2020-03-10 NOTE — PROGRESS NOTES
HISTORY OF PRESENT ILLNESS  Taras Polk is a 65 y.o. female. HPI   Pt normally follows with Dr. Jerod Wallace (PCP). Pt is here for acute care. Pt c/o decreased hearing BL and swooshing sound in R ear x 1 week ago   She was here on 2/24/20 or cough, body aches, headaches and wheezing   Was given tamiflu, states this helped sxs   Did not have ear sxs at this time   States these sxs are new    Denies any pain   Denies drainage from her ears   States her balance has been affected since this, states she keeps running into things   Denies any wheezing or trouble breathing   Provided referral to ENT, discussed seeing as close as possible   Will order asap mri   Will give prednisone taper       Patient Active Problem List    Diagnosis Date Noted    [facility] 12/06/2019    NHL (non-Hodgkin's lymphoma) ([facility] 12/06/2019    Kidney transplant recipient 12/06/2019    Obesity (BMI 30-39.9) 06/26/2019    Other insomnia 06/26/2019     Current Outpatient Medications   Medication Sig Dispense Refill    traZODone (DESYREL) 50 mg tablet Take 1 Tab by mouth nightly. 90 Tab 3    predniSONE (DELTASONE) 5 mg tablet TAKE 1 TABLET BY MOUTH ONCE DAILY 90 Tab 3    sodium bicarbonate 650 mg tablet Take 1 Tab by mouth four (4) times daily. 350 Tab 3    ciprofloxacin HCl (CIPRO) 500 mg tablet Take 500 mg by mouth every Monday, Wednesday, Friday.  cycloSPORINE (SANDIMMUNE) 25 mg capsule Take 25 mg by mouth two (2) times a day. Take 3 tablets 2 times a day      calcium-cholecalciferol, d3, (CALCIUM 600 + D) 600-125 mg-unit tab Take 600 mg by mouth two (2) times a day.        Past Surgical History:   Procedure Laterality Date    HX GYN      hysterectomy     HX TRANSPLANT  04/03/1997    kidney      Lab Results   Component Value Date/Time    WBC 5.7 01/28/2020 08:07 AM    HGB 13.4 01/28/2020 08:07 AM    HCT 39.6 01/28/2020 08:07 AM    PLATELET 776 52/33/9983 08:07 AM     (H) 01/28/2020 08:07 AM     Lab Results   Component Value Date/Time    Cholesterol, total 138 01/28/2020 08:07 AM    HDL Cholesterol 49 01/28/2020 08:07 AM    LDL, calculated 68 01/28/2020 08:07 AM    Triglyceride 103 01/28/2020 08:07 AM     Lab Results   Component Value Date/Time    GFR est non-AA >60 02/17/2020 09:44 AM    GFR est AA >60 02/17/2020 09:44 AM    Creatinine 0.76 02/17/2020 09:44 AM    BUN 13 02/17/2020 09:44 AM    Sodium 142 02/17/2020 09:44 AM    Potassium 3.6 02/17/2020 09:44 AM    Chloride 112 (H) 02/17/2020 09:44 AM    CO2 24 02/17/2020 09:44 AM    Magnesium 1.5 (L) 02/17/2020 09:44 AM    Phosphorus 2.3 (L) 02/17/2020 09:44 AM    Phosphorus 2.4 (L) 02/17/2020 09:44 AM        Review of Systems   HENT: Positive for hearing loss and tinnitus. Respiratory: Negative for shortness of breath. Cardiovascular: Negative for chest pain. Physical Exam  Constitutional:       General: She is not in acute distress. Appearance: She is well-developed. She is not diaphoretic. HENT:      Head: Normocephalic and atraumatic. Right Ear: Tympanic membrane, ear canal and external ear normal.      Left Ear: Tympanic membrane, ear canal and external ear normal.      Ears:      Comments: Hold in L TM      Mouth/Throat:      Mouth: Mucous membranes are moist.      Pharynx: Oropharynx is clear. No oropharyngeal exudate or posterior oropharyngeal erythema. Eyes:      General: No scleral icterus. Right eye: No discharge. Left eye: No discharge. Extraocular Movements: Extraocular movements intact. Conjunctiva/sclera: Conjunctivae normal.      Pupils: Pupils are equal, round, and reactive to light. Neck:      Musculoskeletal: Normal range of motion and neck supple. Cardiovascular:      Rate and Rhythm: Normal rate and regular rhythm. Heart sounds: Normal heart sounds. No murmur. No friction rub. No gallop. Pulmonary:      Effort: Pulmonary effort is normal. No respiratory distress.       Breath sounds: Normal breath sounds. No wheezing or rales. Chest:      Chest wall: No tenderness. Musculoskeletal: Normal range of motion. General: No tenderness or deformity. Comments: 5/5 strength BLUE      Lymphadenopathy:      Cervical: No cervical adenopathy. Skin:     General: Skin is warm and dry. Coloration: Skin is not pale. Findings: No erythema or rash. Neurological:      Mental Status: She is alert and oriented to person, place, and time. Coordination: Coordination normal.      Comments: Cranial nerves 2-12 grossly intact  Finger to nose normal BL  Rapid alternating hand movements normal BL    Psychiatric:         Mood and Affect: Mood normal.         Behavior: Behavior normal.         ASSESSMENT and PLAN    ICD-10-CM ICD-9-CM    1. Pulsatile tinnitus of right ear              Pt with decreased hearing new R sided pulsatile tinnitus changes in R facial sensation and balance difficulty since viral uri 2 weeks ago will get mri brain for further eval will give prednisone for hearing loss and set up with ent    H93. A1 388.30 MRI BRAIN WO CONT      REFERRAL TO ENT-OTOLARYNGOLOGY   2. Decreased hearing, right                    See above  H91.91 389.9 MRI BRAIN WO CONT      REFERRAL TO ENT-OTOLARYNGOLOGY   3. Imbalance            See above  R26.89 781.2 MRI BRAIN WO CONT            Scribed by [employer], as dictated by Dr. Ana Lilia Lagos. Current diagnosis and concerns discussed with pt at length. Pt understands risks and benefits or current treatment plan and medications, and accepts the treatment and medication with any possible risks. Pt asks appropriate questions, which were answered. Pt was instructed to call with any concerns or problems. I have reviewed the note documented by the scribe. The services provided are my own.   The documentation is accurate

## 2020-03-10 NOTE — TELEPHONE ENCOUNTER
Called, spoke to pt. Two pt identifiers confirmed. Pt offered and accepted appt for 3/10/20 1100. Pt verbalized understanding of information discussed w/ no further questions at this time.

## 2020-03-10 NOTE — TELEPHONE ENCOUNTER
#[phone] pt has an earache with roaring in her ears. Pt gerber like to see Dr. Suzy Stafford today or tomorrow, 3-11-20.   Please call pt

## 2020-03-13 ENCOUNTER — HOSPITAL ENCOUNTER (OUTPATIENT)
Dept: MRI IMAGING | Age: 70
Discharge: HOME | End: 2020-03-13
Attending: INTERNAL MEDICINE
Payer: MEDICARE

## 2020-03-13 DIAGNOSIS — R26.89 IMBALANCE: ICD-10-CM

## 2020-03-13 DIAGNOSIS — H91.91 DECREASED HEARING, RIGHT: ICD-10-CM

## 2020-03-13 DIAGNOSIS — H93.A1 PULSATILE TINNITUS OF RIGHT EAR: ICD-10-CM

## 2020-03-13 PROCEDURE — 70551 MRI BRAIN STEM W/O DYE: CPT

## 2020-03-16 ENCOUNTER — TELEPHONE (OUTPATIENT)
Dept: INTERNAL MEDICINE CLINIC | Age: 70
End: 2020-03-16

## 2020-03-16 NOTE — PROGRESS NOTES
Please call patient back about results  Spoke with ent dr Viktoria Gallo, unlikely to be csf leak  Pt is currently being tx with prednisone , abx already complete  Findings c/w sinus/ear sx  F/u with dr Viktoria Gallo as scheduled

## 2020-03-16 NOTE — TELEPHONE ENCOUNTER
#[phone] [facility]'s imaging would like a call that you received MRI brain w/o contrast done on 3-13-20    Please call

## 2020-03-16 NOTE — TELEPHONE ENCOUNTER
Spoke to Carlos Eduardo at [facility] radiology to confirm the MRI was received.

## 2020-03-18 NOTE — PROGRESS NOTES
Called, spoke to pt. Two pt identifiers confirmed. Pt informed per Dr. Pao Yang that spoke with ENT Dr. Doroteo Lpoez they think it is unlikely to be a CSF leak. Pt informed per Dr. Pao Yang she is currently being tx with prednisone; abx already complete. Pt informed per Dr. Pao Yang MRI findings c/w sinus/ear sx. Pt informed per Dr. Pao Yang f/u with Dr. Doroteo Lopez as scheduled. Pt verbalized understanding of information discussed w/ no further questions at this time.

## 2020-05-07 ENCOUNTER — TELEPHONE (OUTPATIENT)
Dept: INTERNAL MEDICINE CLINIC | Age: 70
End: 2020-05-07

## 2020-05-07 DIAGNOSIS — G47.00 INSOMNIA, UNSPECIFIED TYPE: Primary | ICD-10-CM

## 2020-05-07 RX ORDER — ZOLPIDEM TARTRATE 5 MG/1
5 TABLET ORAL
Qty: 30 TAB | Refills: 3 | Status: SHIPPED | OUTPATIENT
Start: 2020-05-07 | End: 2020-08-25

## 2020-05-07 NOTE — TELEPHONE ENCOUNTER
Called, spoke to pt. Two identifiers confirmed. Pt stated trazodone is not helping with sleep. Pt still not able to fall asleep at night and get a full nights sleep. Pt stated she has tried numerous sleep medication and Ambien seems to work the best.   Notified pt a message will be sent to Dr. Harris Schilder. Pt verbalized understanding of information discussed w/ no further questions at this time.

## 2020-05-07 NOTE — TELEPHONE ENCOUNTER
Jose Henriquez III, DO  You 7 minutes ago (10:51 AM)     Stop trazodone.  Rx sent in for ambien. Routing comment      Pt notified.

## 2020-05-14 ENCOUNTER — VIRTUAL VISIT (OUTPATIENT)
Dept: INTERNAL MEDICINE CLINIC | Age: 70
End: 2020-05-14

## 2020-05-14 DIAGNOSIS — M54.9 ACUTE BILATERAL BACK PAIN, UNSPECIFIED BACK LOCATION: Primary | ICD-10-CM

## 2020-05-14 RX ORDER — TIZANIDINE 2 MG/1
2 TABLET ORAL 3 TIMES DAILY
Qty: 30 TAB | Refills: 0 | Status: SHIPPED | OUTPATIENT
Start: 2020-05-14 | End: 2020-06-29

## 2020-05-14 NOTE — PROGRESS NOTES
Mario Herman is a 65 y.o. female patient of Dr. Roland Wilkerson who presents with back pain. The patient reports upper back pain to neck and lower back pain. No neuro symptoms in extremities. Pain in right shoulder. Patient denies injury. Not exercising, but does does for walks, last time yesterday. Reports this pain started last night. Took tylenol. Used heat. Used lidoderm. No NSAIDS, kidney transplant patient. On prednisone daily. Taking ambien at bedtime. This is an established visit conducted via telemedicine, by phone. The patient has been instructed that this meets HIPAA criteria and acknowledges and agrees to this method of visitation. Pursuant to the emergency declaration under the [facility], 1135 waiver authority and the [employer] Act, this Virtual Visit was conducted, with patient's consent, to reduce the patient's risk of exposure to COVID-19 and provide continuity of care for an established patient. Services were provided by phone to substitute for in-person clinic visit.        Past Medical History:   Diagnosis Date    Cancer ([facility]     Chronic kidney disease        Family History   Problem Relation Age of Onset    Diabetes Mother        Social History     Socioeconomic History    Marital status:      Spouse name: Not on file    Number of children: Not on file    Years of education: Not on file    Highest education level: Not on file   Occupational History    Not on file   Social Needs    Financial resource strain: Not on file    Food insecurity     Worry: Not on file     Inability: Not on file    Transportation needs     Medical: Not on file     Non-medical: Not on file   Tobacco Use    Smoking status: Never Smoker    Smokeless tobacco: Never Used   Substance and Sexual Activity    Alcohol use: Not Currently     Frequency: Never    Drug use: Never    Sexual activity: Not Currently   Lifestyle    Physical activity     Days per week: Not on file     Minutes per session: Not on file    Stress: Not on file   Relationships    Social connections     Talks on phone: Not on file     Gets together: Not on file     Attends Bahai service: Not on file     Active member of club or organization: Not on file     Attends meetings of clubs or organizations: Not on file     Relationship status: Not on file    Intimate partner violence     Fear of current or ex partner: Not on file     Emotionally abused: Not on file     Physically abused: Not on file     Forced sexual activity: Not on file   Other Topics Concern    Not on file   Social History Narrative    Not on file       Current Outpatient Medications on File Prior to Visit   Medication Sig Dispense Refill    zolpidem (AMBIEN) 5 mg tablet Take 1 Tab by mouth nightly as needed for Sleep. Max Daily Amount: 5 mg. 30 Tab 3    predniSONE (DELTASONE) 5 mg tablet TAKE 1 TABLET BY MOUTH ONCE DAILY 90 Tab 3    sodium bicarbonate 650 mg tablet Take 1 Tab by mouth four (4) times daily. 350 Tab 3    ciprofloxacin HCl (CIPRO) 500 mg tablet Take 500 mg by mouth every Monday, Wednesday, Friday.  cycloSPORINE (SANDIMMUNE) 25 mg capsule Take 25 mg by mouth two (2) times a day. Take 3 tablets 2 times a day      calcium-cholecalciferol, d3, (CALCIUM 600 + D) 600-125 mg-unit tab Take 600 mg by mouth two (2) times a day.  predniSONE (DELTASONE) 20 mg tablet 60mg po daily for 2days, 40mg po daily for 2 days, 20mg po daily for 2days then stop 12 Tab 0     No current facility-administered medications on file prior to visit. Review of Systems  Pertinent items are noted in HPI. Objective:     Gen: healthy sounding female  HEENT: no audible congestion, patient does not see oral erythema and sees MMM  Neck: patient does not feel enlarged or tender LAD or masses  Resp: normal respiratory effort, no audible wheezing.    CV: patient does not feel palpitations or heart irregularity  Abd: patient does not feel abdominal tenderness or mass, patient does not notice distension  Extrem: patient does not see swelling in ankles or joints. Neuro: Alert and oriented, able to answer questions without difficulty, patient reports able to move all extremities and walk normally        Assessment/Plan:       ICD-10-CM ICD-9-CM    1. Acute bilateral back pain, unspecified back location M54.9 724.5 tiZANidine (ZANAFLEX) 2 mg tablet   reduced dose of tizanidine due to cipro interaction. Use heat, stretch. Tylenol prn. This was a telemedicine visit by phone, duration 11 minutes.          Florentin Amaya MD

## 2020-05-18 ENCOUNTER — HOSPITAL ENCOUNTER (OUTPATIENT)
Dept: GENERAL RADIOLOGY | Age: 70
Discharge: HOME | End: 2020-05-18
Payer: MEDICARE

## 2020-05-18 ENCOUNTER — TELEPHONE (OUTPATIENT)
Dept: INTERNAL MEDICINE CLINIC | Age: 70
End: 2020-05-18

## 2020-05-18 ENCOUNTER — VIRTUAL VISIT (OUTPATIENT)
Dept: INTERNAL MEDICINE CLINIC | Age: 70
End: 2020-05-18

## 2020-05-18 DIAGNOSIS — M54.2 NECK PAIN: ICD-10-CM

## 2020-05-18 DIAGNOSIS — M54.50 ACUTE MIDLINE LOW BACK PAIN WITHOUT SCIATICA: Primary | ICD-10-CM

## 2020-05-18 DIAGNOSIS — M54.50 ACUTE MIDLINE LOW BACK PAIN WITHOUT SCIATICA: ICD-10-CM

## 2020-05-18 DIAGNOSIS — M06.9 RHEUMATOID ARTHRITIS, INVOLVING UNSPECIFIED SITE, UNSPECIFIED RHEUMATOID FACTOR PRESENCE: ICD-10-CM

## 2020-05-18 DIAGNOSIS — C85.91 NON-HODGKIN LYMPHOMA OF LYMPH NODES OF NECK, UNSPECIFIED NON-HODGKIN LYMPHOMA TYPE (HCC): ICD-10-CM

## 2020-05-18 DIAGNOSIS — Z94.0 KIDNEY TRANSPLANT RECIPIENT: ICD-10-CM

## 2020-05-18 PROCEDURE — 72072 X-RAY EXAM THORAC SPINE 3VWS: CPT

## 2020-05-18 PROCEDURE — 72050 X-RAY EXAM NECK SPINE 4/5VWS: CPT

## 2020-05-18 PROCEDURE — 72100 X-RAY EXAM L-S SPINE 2/3 VWS: CPT

## 2020-05-18 RX ORDER — SODIUM BICARBONATE 650 MG/1
TABLET ORAL
Qty: 360 TAB | Refills: 3 | Status: SHIPPED | OUTPATIENT
Start: 2020-05-18 | End: 2020-10-21 | Stop reason: SDUPTHER

## 2020-05-18 RX ORDER — TRAMADOL HYDROCHLORIDE 50 MG/1
50 TABLET ORAL
Qty: 30 TAB | Refills: 0 | Status: SHIPPED | OUTPATIENT
Start: 2020-05-18 | End: 2020-05-28

## 2020-05-18 NOTE — PROGRESS NOTES
Christina Rosas is a 65 y.o. female who was seen by synchronous (real-time) audio-video technology on 5/18/2020. Consent: Christina Rosas, who was seen by synchronous (real-time) audio-video technology, and/or her healthcare decision maker, is aware that this patient-initiated, Telehealth encounter on 5/18/2020 is a billable service, with coverage as determined by her insurance carrier. She is aware that she may receive a bill and has provided verbal consent to proceed: Yes. Assessment & Plan:   Diagnoses and all orders for this visit:    1. Acute midline low back pain without sciatica  -     traMADoL (ULTRAM) 50 mg tablet; Take 1 Tab by mouth every eight (8) hours as needed for Pain for up to 10 days. Max Daily Amount: 150 mg.  -     XR SPINE LUMB 2 OR 3 V; Future    2. Rheumatoid arthritis, involving unspecified site, unspecified rheumatoid factor presence ([facility]    3. Non-Hodgkin lymphoma of lymph nodes of neck, unspecified non-Hodgkin lymphoma type ([facility]    4. Kidney transplant recipient    5. Neck pain  -     XR SPINE THORAC 3 V; Future  -     XR SPINE CERV 4 OR 5 V; Future          I spent at least 40 minutes on this visit with this established patient. [phone]  Subjective:   Christina Rosas is a 65 y.o. female who was seen for Back Pain (x1 week; pain unbearable since 05/14/2020; muscle relaxer not effective; does not recall injury)    Last seen by me feb 14, 2020. Spoke with dr Ike Navarrete here may 14 for low back pain. Was given rx for zanaflex, but it has not helped at all. Pain is primarily in her low back, just about her hips, but it radiates up her spine. She also complains of pain at her shoulder level, into her neck. Denies any trauma. Has been on prednisone for years. This is a virtual visit due to covid 19. Prior to Admission medications    Medication Sig Start Date End Date Taking?  Authorizing Provider   traMADoL (ULTRAM) 50 mg tablet Take 1 Tab by mouth every eight (8) hours as needed for Pain for up to 10 days. Max Daily Amount: 150 mg. 5/18/20 5/28/20 Yes Jose Henriquez III, DO   tiZANidine (ZANAFLEX) 2 mg tablet Take 1 Tab by mouth three (3) times daily. As needed for pain. 5/14/20  Yes Nelia Randhawa MD   zolpidem (AMBIEN) 5 mg tablet Take 1 Tab by mouth nightly as needed for Sleep. Max Daily Amount: 5 mg. 5/7/20  Yes Jose Henriquez III, DO   predniSONE (DELTASONE) 5 mg tablet TAKE 1 TABLET BY MOUTH ONCE DAILY 1/27/20  Yes Jose Henriquez III, DO   sodium bicarbonate 650 mg tablet Take 1 Tab by mouth four (4) times daily. 12/6/19  Yes Claire RUIZ III, DO   ciprofloxacin HCl (CIPRO) 500 mg tablet Take 500 mg by mouth every Monday, Wednesday, Friday. 3/25/19  Yes Provider, Historical   cycloSPORINE (SANDIMMUNE) 25 mg capsule Take 25 mg by mouth two (2) times a day. Take 3 tablets 2 times a day 6/3/19  Yes Provider, Historical   calcium-cholecalciferol, d3, (CALCIUM 600 + D) 600-125 mg-unit tab Take 600 mg by mouth two (2) times a day. Yes Provider, Historical   predniSONE (DELTASONE) 20 mg tablet 60mg po daily for 2days, 40mg po daily for 2 days, 20mg po daily for 2days then stop 3/10/20   Rosa Rendon MD     No Known Allergies        ROS      Objective: There were no vitals taken for this visit. General: alert, cooperative, no distress   Mental  status: normal mood, behavior, speech, dress, motor activity, and thought processes, able to follow commands   HENT: NCAT   Neck: no visualized mass   Resp: no respiratory distress   Neuro: no gross deficits   Skin: no discoloration or lesions of concern on visible areas   Psychiatric: normal affect, consistent with stated mood, no evidence of hallucinations     Additional exam findings:     1. Low back pain--check xray lumbar spine. Given her prolonged prednisone use, concerned about compression fracture. rx for ultram sent in as well.   2.  Upper back, neck pain--see above, will check xrays of thoracic and cervical spine as well. Further recs pending xray results. 3.  RA--on prednisone  4. Hx kidney transplant--on cyclosporine, bicarbonate, prednisone  5. Hx NHL--    rtc for regular visit. We discussed the expected course, resolution and complications of the diagnosis(es) in detail. Medication risks, benefits, costs, interactions, and alternatives were discussed as indicated. I advised her to contact the office if her condition worsens, changes or fails to improve as anticipated. She expressed understanding with the diagnosis(es) and plan. Jaya Batista is a 65 y.o. female who was evaluated by a video visit encounter for concerns as above. Patient identification was verified prior to start of the visit. A caregiver was present when appropriate. Due to this being a TeleHealth encounter (During JEL-77 public health emergency), evaluation of the following organ systems was limited: Vitals/Constitutional/EENT/Resp/CV/GI//MS/Neuro/Skin/Heme-Lymph-Imm. Pursuant to the emergency declaration under the [facility], 1135 waiver authority and the [employer] Act, this Virtual  Visit was conducted, with patient's (and/or legal guardian's) consent, to reduce the patient's risk of exposure to COVID-19 and provide necessary medical care. Services were provided through a video synchronous discussion virtually to substitute for in-person clinic visit. Patient and provider were located at their individual homes.       Dayton Alvarez III, DO

## 2020-05-18 NOTE — PATIENT INSTRUCTIONS
Office Policies Phone calls/patient messages: Please allow up to 24 hours for someone in the office to contact you about your call or message. Be mindful your provider may be out of the office or your message may require further review. We encourage you to use [employer] for your messages as this is a faster, more efficient way to communicate with our office Medication Refills: 
         
Prescription medications require 48-72 business hours to process. We encourage you to use [employer] for your refills. For controlled medications: Please allow 72 business hours to process. Certain medications may require you to  a written prescription at our office. NO narcotic/controlled medications will be prescribed after 4pm Monday through Friday or on weekends Form/Paperwork Completion: 
         
Please note a $25 fee may incur for all paperwork for completed by our providers. We ask that you allow 7-10 business days. Pre-payment is due prior to picking up/faxing the completed form. You may also download your forms to [employer] to have your doctor print off. This is an established visit conducted via telemedicine. The patient has been instructed that this meets HIPAA criteria and acknowledges and agrees to this method of visitation.  
 
Sean Bolivar LPN 
17/[birth year]/51 
12:95 AM

## 2020-05-18 NOTE — PROGRESS NOTES
xrays of spine show some DDD (arthritis), but no fractures. Hopefully the ultram will help. If pain persists, would have her see orthopedics, dr Mary Ellen Espinal.

## 2020-05-19 NOTE — TELEPHONE ENCOUNTER
[facility]   Phone Number: [phone]             Caller's first and last name: Kole Renteria   Reason for call: Pharmacy will not fill prescription  \"Tramadol\" due to drug interaction. Callback required yes/no and why: Yes, to advise   Best contact number(s): [phone]   Details to clarify the request: Daughter wants to know what the x-rays showed.      Copy/Paste  ENVERA

## 2020-05-19 NOTE — TELEPHONE ENCOUNTER
Called, spoke to [place] (Daughter)  Received two pt identifiers  [place] informed per Dr. Bess Adler of spine show some DDD (arthritis), but no fractures.  Hopefully the ultram will help. If pain persists, would have her see orthopedics, dr Zulay Bahena. [place] verbalizes understanding of the instructions and has no further questions at this time.

## 2020-05-19 NOTE — TELEPHONE ENCOUNTER
Called pharmacy, spoke with pharm tech Niesha Kelly. Niesha Kelly states pt has critical combo tizanidine trazadone, tramadol that could reduce respiratory function and needs okay from doctor. Authorization given by Dr. Sara Cortez.

## 2020-06-24 ENCOUNTER — VIRTUAL VISIT (OUTPATIENT)
Dept: INTERNAL MEDICINE CLINIC | Age: 70
End: 2020-06-24

## 2020-06-24 ENCOUNTER — TELEPHONE (OUTPATIENT)
Dept: INTERNAL MEDICINE CLINIC | Age: 70
End: 2020-06-24

## 2020-06-24 NOTE — TELEPHONE ENCOUNTER
#[phone] Richard Pitts states there is a family emergency and pt can't make appt this morning. Please call back later today or tomorrow, 6-25-20 to reschedule. 30 min appt was scheduled. Thanks.          #[phone]  Pt calling to reschedule appt missed yesterday, 6-24-20

## 2020-06-25 NOTE — TELEPHONE ENCOUNTER
Virtual AWV scheduled for 6/29 @ 330 with Dr. Ramona Bhatti.  Pt verbalized understanding of information discussed w/ no further questions at this time.

## 2020-06-29 ENCOUNTER — VIRTUAL VISIT (OUTPATIENT)
Dept: INTERNAL MEDICINE CLINIC | Age: 70
End: 2020-06-29

## 2020-06-29 DIAGNOSIS — Z94.0 KIDNEY TRANSPLANT RECIPIENT: ICD-10-CM

## 2020-06-29 DIAGNOSIS — M06.9 RHEUMATOID ARTHRITIS, INVOLVING UNSPECIFIED SITE, UNSPECIFIED RHEUMATOID FACTOR PRESENCE: ICD-10-CM

## 2020-06-29 DIAGNOSIS — C85.90 NON-HODGKIN'S LYMPHOMA, UNSPECIFIED BODY REGION, UNSPECIFIED NON-HODGKIN LYMPHOMA TYPE (HCC): ICD-10-CM

## 2020-06-29 DIAGNOSIS — C85.91 NON-HODGKIN LYMPHOMA OF LYMPH NODES OF NECK, UNSPECIFIED NON-HODGKIN LYMPHOMA TYPE (HCC): ICD-10-CM

## 2020-06-29 DIAGNOSIS — Z00.00 MEDICARE ANNUAL WELLNESS VISIT, SUBSEQUENT: Primary | ICD-10-CM

## 2020-06-29 RX ORDER — TRAZODONE HYDROCHLORIDE 50 MG/1
TABLET ORAL
COMMUNITY
Start: 2020-04-26 | End: 2020-06-29

## 2020-06-29 RX ORDER — CYCLOSPORINE 25 MG/1
75 CAPSULE, GELATIN COATED ORAL 2 TIMES DAILY
Qty: 540 CAP | Refills: 3 | Status: SHIPPED | OUTPATIENT
Start: 2020-06-29 | End: 2020-07-06 | Stop reason: SDUPTHER

## 2020-06-29 RX ORDER — ZOSTER VACCINE RECOMBINANT, ADJUVANTED 50 MCG/0.5
0.5 KIT INTRAMUSCULAR ONCE
Qty: 0.5 ML | Refills: 1 | Status: SHIPPED | OUTPATIENT
Start: 2020-06-29 | End: 2020-06-29

## 2020-06-29 RX ORDER — TETANUS TOXOID, REDUCED DIPHTHERIA TOXOID AND ACELLULAR PERTUSSIS VACCINE, ADSORBED 5; 2.5; 8; 8; 2.5 [IU]/.5ML; [IU]/.5ML; UG/.5ML; UG/.5ML; UG/.5ML
0.5 SUSPENSION INTRAMUSCULAR ONCE
Qty: 0.5 ML | Refills: 0 | Status: SHIPPED | OUTPATIENT
Start: 2020-06-29 | End: 2020-06-29

## 2020-06-29 RX ORDER — CYCLOSPORINE 25 MG/1
25 CAPSULE, GELATIN COATED ORAL 2 TIMES DAILY
Qty: 180 CAP | Refills: 1 | Status: CANCELLED | OUTPATIENT
Start: 2020-06-29

## 2020-06-29 NOTE — PROGRESS NOTES
Jaya Batista is a 65 y.o. female who was seen by synchronous (real-time) audio-video technology on 6/29/2020. Consent: Jaya Batista, who was seen by synchronous (real-time) audio-video technology, and/or her healthcare decision maker, is aware that this patient-initiated, Telehealth encounter on 6/29/2020 is a billable service, with coverage as determined by her insurance carrier. She is aware that she may receive a bill and has provided verbal consent to proceed: Yes. Assessment & Plan:   Diagnoses and all orders for this visit:    1. Medicare annual wellness visit, subsequent    2. Rheumatoid arthritis, involving unspecified site, unspecified rheumatoid factor presence ([facility]    3. Non-Hodgkin's lymphoma, unspecified body region, unspecified non-Hodgkin lymphoma type ([facility]    4. Non-Hodgkin lymphoma of lymph nodes of neck, unspecified non-Hodgkin lymphoma type ([facility]    5. Kidney transplant recipient    Other orders  -     diphth,pertus,acell,,tetanus (Boostrix Tdap) 2.5-8-5 Lf-mcg-Lf/0.5mL susp suspension; 0.5 mL by IntraMUSCular route once for 1 dose.  -     varicella-zoster recombinant, PF, (Shingrix, PF,) 50 mcg/0.5 mL susr injection; 0.5 mL by IntraMUSCular route once for 1 dose. Repeat 2nd dose in 2-6 months. -     cycloSPORINE (SandIMMUNE) 25 mg capsule; Take 3 Caps by mouth two (2) times a day. I spent at least 25 minutes on this visit with this established patient. 712  Subjective:   Jaya Batista is a 65 y.o. female who was seen for Annual Wellness Visit    Last seen by me may 18, 2020, when she was having some low back pain. xrays showed ddd/arthritis. She is doing better now, and has no complaints or concerns. Does need refills on her cyclosporine. This is a virtual visit due to covid 19. Prior to Admission medications    Medication Sig Start Date End Date Taking?  Authorizing Provider   sodium bicarbonate 650 mg tablet TAKE 1 TABLET BY MOUTH FOUR TIMES A DAY 5/18/20  Yes Jose Henriquez III, DO   zolpidem (AMBIEN) 5 mg tablet Take 1 Tab by mouth nightly as needed for Sleep. Max Daily Amount: 5 mg. 5/7/20  Yes Jose Henriquez III, DO   predniSONE (DELTASONE) 5 mg tablet TAKE 1 TABLET BY MOUTH ONCE DAILY 1/27/20  Yes Jose Henriquez III, DO   cycloSPORINE (SANDIMMUNE) 25 mg capsule Take 25 mg by mouth two (2) times a day. Take 3 tablets 2 times a day 6/3/19  Yes Provider, Historical   calcium-cholecalciferol, d3, (CALCIUM 600 + D) 600-125 mg-unit tab Take 600 mg by mouth two (2) times a day. Yes Provider, Historical     No Known Allergies        ROS      Objective: There were no vitals taken for this visit. General: alert, cooperative, no distress   Mental  status: normal mood, behavior, speech, dress, motor activity, and thought processes, able to follow commands   HENT: NCAT   Neck: no visualized mass   Resp: no respiratory distress   Neuro: no gross deficits   Skin: no discoloration or lesions of concern on visible areas   Psychiatric: normal affect, consistent with stated mood, no evidence of hallucinations     Additional exam findings:     1. Hx kidney transplant--continue with cyclosporine, nahco3  2. Hx NHL--treated, cured. Follows with oncology, dr Saskia Colby  3. RA--on prednisone  4. Insomnia--continue ambien    Had eye exam at [facility] on [address]. Had colon at [facility] about 7 years ago. rx given for tdap and shin. rtc 3 months    We discussed the expected course, resolution and complications of the diagnosis(es) in detail. Medication risks, benefits, costs, interactions, and alternatives were discussed as indicated. I advised her to contact the office if her condition worsens, changes or fails to improve as anticipated. She expressed understanding with the diagnosis(es) and plan. aPco Jauregui is a 65 y.o. female who was evaluated by a video visit encounter for concerns as above.  Patient identification was verified prior to start of the visit. A caregiver was present when appropriate. Due to this being a TeleHealth encounter (During WIR-69 public health emergency), evaluation of the following organ systems was limited: Vitals/Constitutional/EENT/Resp/CV/GI//MS/Neuro/Skin/Heme-Lymph-Imm. Pursuant to the emergency declaration under the [address] [place] [address] wa[facility] authority and the [employer] Act, this Virtual  Visit was conducted, with patient's (and/or legal guardian's) consent, to reduce the patient's risk of exposure to COVID-19 and provide necessary medical care. Services were provided through a video synchronous discussion virtually to substitute for in-person clinic visit. Patient and provider were located at their individual homes. Lukas Spivey III, DO          This is the Subsequent Medicare Annual Wellness Exam, performed 12 months or more after the Initial AWV or the last Subsequent AWV    Consent: Mario Herman, who was seen by synchronous (real-time) audio-video technology, and/or her healthcare decision maker, is aware that this patient-initiated, Telehealth encounter on 6/29/2020 is a billable service. While AWVs are fully covered by Medicare, any services rendered on this date that are not included in an AWV are subject to additional billing, with coverage as determined by her insurance carrier. She is aware that she may receive a bill for any such additional services and has provided verbal consent to proceed: Yes. I have reviewed the patient's medical history in detail and updated the computerized patient record. History     Patient Active Problem List   Diagnosis Code    Obesity (BMI 30-39. 9) E66.9    Other insomnia G47.09    Rheumatoid arthritis (HCC) M06.9    NHL (non-Hodgkin's lymphoma) (HCC) C85.90    Kidney transplant recipient Z80.0     Past Medical History:   Diagnosis Date    Cancer ([facility]     Chronic kidney disease       Past Surgical History:   Procedure Laterality Date    HX GYN      hysterectomy     HX TRANSPLANT  04/03/1997    kidney     Current Outpatient Medications   Medication Sig Dispense Refill    sodium bicarbonate 650 mg tablet TAKE 1 TABLET BY MOUTH FOUR TIMES A  Tab 3    zolpidem (AMBIEN) 5 mg tablet Take 1 Tab by mouth nightly as needed for Sleep. Max Daily Amount: 5 mg. 30 Tab 3    predniSONE (DELTASONE) 5 mg tablet TAKE 1 TABLET BY MOUTH ONCE DAILY 90 Tab 3    cycloSPORINE (SANDIMMUNE) 25 mg capsule Take 25 mg by mouth two (2) times a day. Take 3 tablets 2 times a day      calcium-cholecalciferol, d3, (CALCIUM 600 + D) 600-125 mg-unit tab Take 600 mg by mouth two (2) times a day. No Known Allergies    Family History   Problem Relation Age of Onset    Diabetes Mother      Social History     Tobacco Use    Smoking status: Never Smoker    Smokeless tobacco: Never Used   Substance Use Topics    Alcohol use: Not Currently     Frequency: Never       Depression Risk Factor Screening:     3 most recent PHQ Screens 6/29/2020   Little interest or pleasure in doing things Not at all   Feeling down, depressed, irritable, or hopeless Not at all   Total Score PHQ 2 0       Alcohol Risk Factor Screening:   Do you average 1 drink per night or more than 7 drinks a week:  No    On any one occasion in the past three months have you have had more than 3 drinks containing alcohol:  No      Functional Ability and Level of Safety:   Hearing: Hearing is good. Activities of Daily Living: The home contains: no safety equipment. Patient does total self care     Ambulation: with no difficulty     Fall Risk:  Fall Risk Assessment, last 12 mths 6/29/2020   Able to walk? Yes   Fall in past 12 months?  No     Abuse Screen:  Patient is not abused       Cognitive Screening   Has your family/caregiver stated any concerns about your memory: no    Cognitive Screening: Normal - MMSE (Mini Mental Status Exam)    Patient Care Team   Patient Care Team:  Cara Black DO as PCP - General (Internal Medicine)  Cara Black DO as PCP - [facility] Provider  Jay Puckett MD (Colon and Rectal Surgery)  Seun Pugh MD (Nephrology)  Jessica Guaman MD (Nephrology)    Assessment/Plan   Education and counseling provided:  Are appropriate based on today's review and evaluation  End-of-Life planning (with patient's consent)  Pneumococcal Vaccine  Influenza Vaccine  Screening Mammography  Screening Pap and pelvic (covered once every 2 years)  Bone mass measurement (DEXA)    Diagnoses and all orders for this visit:    1. Medicare annual wellness visit, subsequent        Health Maintenance Due   Topic Date Due    DTaP/Tdap/Td series (1 - Tdap) 03/10/1969    Shingrix Vaccine Age 50> (1 of 2) 03/10/1998    FOBT Q1Y Age 50-75  03/10/1998    GLAUCOMA SCREENING Q2Y  03/10/2013    Pneumococcal 65+ years (2 of 2 - PPSV23) 06/26/2020    Medicare Yearly Exam  06/26/2020       Maryana Hurst is a 65 y.o. female who was evaluated by an audio-video encounter for concerns as above. Patient identification was verified prior to start of the visit. A caregiver was present when appropriate. Due to this being a TeleHealth encounter (During [place] [address] public health emergency), evaluation of the following organ systems was limited: Vitals/Constitutional/EENT/Resp/CV/GI//MS/Neuro/Skin/Heme-Lymph-Imm. Pursuant to the emergency declaration under the [facility] waiver authority and the [employer] Act, this Virtual Visit was conducted, with patient's (and/or legal guardian's) consent, to reduce the patient's risk of exposure to COVID-19 and provide necessary medical care. Services were provided through a synchronous discussion virtually to substitute for in-person clinic visit. I was in the office.  The patient was at home.       Ludy Campbell III, DO

## 2020-07-02 ENCOUNTER — TELEPHONE (OUTPATIENT)
Dept: INTERNAL MEDICINE CLINIC | Age: 70
End: 2020-07-02

## 2020-07-06 DIAGNOSIS — Z94.0 HX OF KIDNEY TRANSPLANT: Primary | ICD-10-CM

## 2020-07-06 RX ORDER — CYCLOSPORINE 25 MG/1
75 CAPSULE, GELATIN COATED ORAL 2 TIMES DAILY
Qty: 540 CAP | Refills: 3 | Status: SHIPPED | OUTPATIENT
Start: 2020-07-06 | End: 2021-10-12 | Stop reason: SDUPTHER

## 2020-08-17 ENCOUNTER — HOSPITAL ENCOUNTER (OUTPATIENT)
Dept: INFUSION THERAPY | Age: 70
Discharge: HOME | End: 2020-08-17

## 2020-08-17 ENCOUNTER — TELEPHONE (OUTPATIENT)
Dept: INTERNAL MEDICINE CLINIC | Age: 70
End: 2020-08-17

## 2020-08-17 NOTE — TELEPHONE ENCOUNTER
#[phone] pt has an appt this morning for [facility] and they need an order. The request was faxed last week she states. Please fax that order to fax #[phone] as soon as possible.

## 2020-08-20 ENCOUNTER — HOSPITAL ENCOUNTER (OUTPATIENT)
Dept: INFUSION THERAPY | Age: 70
Discharge: HOME | End: 2020-08-20
Payer: MEDICARE

## 2020-08-20 VITALS
TEMPERATURE: 97.5 F | HEART RATE: 86 BPM | DIASTOLIC BLOOD PRESSURE: 84 MMHG | OXYGEN SATURATION: 98 % | SYSTOLIC BLOOD PRESSURE: 121 MMHG | RESPIRATION RATE: 16 BRPM

## 2020-08-20 LAB
ALBUMIN SERPL-MCNC: 3.1 G/DL (ref 3.5–5)
ANION GAP SERPL CALC-SCNC: 5 MMOL/L (ref 5–15)
BUN SERPL-MCNC: 12 MG/DL (ref 6–20)
BUN/CREAT SERPL: 16 (ref 12–20)
CALCIUM SERPL-MCNC: 9.1 MG/DL (ref 8.5–10.1)
CHLORIDE SERPL-SCNC: 114 MMOL/L (ref 97–108)
CO2 SERPL-SCNC: 22 MMOL/L (ref 21–32)
CREAT SERPL-MCNC: 0.76 MG/DL (ref 0.55–1.02)
GLUCOSE SERPL-MCNC: 109 MG/DL (ref 65–100)
MAGNESIUM SERPL-MCNC: 1.7 MG/DL (ref 1.6–2.4)
PHOSPHATE SERPL-MCNC: 3 MG/DL (ref 2.6–4.7)
PHOSPHATE SERPL-MCNC: 3.1 MG/DL (ref 2.6–4.7)
POTASSIUM SERPL-SCNC: 4.1 MMOL/L (ref 3.5–5.1)
SODIUM SERPL-SCNC: 141 MMOL/L (ref 136–145)

## 2020-08-20 PROCEDURE — 84100 ASSAY OF PHOSPHORUS: CPT

## 2020-08-20 PROCEDURE — 36415 COLL VENOUS BLD VENIPUNCTURE: CPT

## 2020-08-20 PROCEDURE — 96372 THER/PROPH/DIAG INJ SC/IM: CPT

## 2020-08-20 PROCEDURE — 74011250636 HC RX REV CODE- 250/636: Performed by: INTERNAL MEDICINE

## 2020-08-20 PROCEDURE — 80069 RENAL FUNCTION PANEL: CPT

## 2020-08-20 PROCEDURE — 83735 ASSAY OF MAGNESIUM: CPT

## 2020-08-20 RX ADMIN — DENOSUMAB 60 MG: 60 INJECTION SUBCUTANEOUS at 11:30

## 2020-08-20 NOTE — PROGRESS NOTES
[facility]patient [facility]fusion [facility] Progress Note    1000 Pt admit to [facility] for q 6 month Prolia ambulatory in stable condition. Assessment completed. No new concerns voiced. Labs drawn peripherally and sent for processing. Visit Vitals  /84 (BP 1 Location: Left arm, BP Patient Position: Sitting)   Pulse 86   Temp 97.5 °F (36.4 °C)   Resp 16   SpO2 98%   Breastfeeding No       Medications:  Prolia    1132 Pt tolerated treatment well. D/c home ambulatory in no distress. Pt aware of next appointment scheduled for 2/15/21.     Recent Results (from the past 12 hour(s))   RENAL FUNCTION PANEL    Collection Time: 08/20/20 10:22 AM   Result Value Ref Range    Sodium 141 136 - 145 mmol/L    Potassium 4.1 3.5 - 5.1 mmol/L    Chloride 114 (H) 97 - 108 mmol/L    CO2 22 21 - 32 mmol/L    Anion gap 5 5 - 15 mmol/L    Glucose 109 (H) 65 - 100 mg/dL    BUN 12 6 - 20 MG/DL    Creatinine 0.76 0.55 - 1.02 MG/DL    BUN/Creatinine ratio 16 12 - 20      GFR est AA >60 >60 ml/min/1.73m2    GFR est non-AA >60 >60 ml/min/1.73m2    Calcium 9.1 8.5 - 10.1 MG/DL    Phosphorus 3.0 2.6 - 4.7 MG/DL    Albumin 3.1 (L) 3.5 - 5.0 g/dL   MAGNESIUM    Collection Time: 08/20/20 10:22 AM   Result Value Ref Range    Magnesium 1.7 1.6 - 2.4 mg/dL   PHOSPHORUS    Collection Time: 08/20/20 10:22 AM   Result Value Ref Range    Phosphorus 3.1 2.6 - 4.7 MG/DL

## 2020-08-25 ENCOUNTER — TELEPHONE (OUTPATIENT)
Dept: INTERNAL MEDICINE CLINIC | Age: 70
End: 2020-08-25

## 2020-08-25 DIAGNOSIS — G47.00 INSOMNIA, UNSPECIFIED TYPE: ICD-10-CM

## 2020-08-25 RX ORDER — ZOLPIDEM TARTRATE 5 MG/1
TABLET ORAL
Qty: 30 TAB | Refills: 3 | Status: SHIPPED | OUTPATIENT
Start: 2020-08-25 | End: 2020-10-21 | Stop reason: SDUPTHER

## 2020-08-25 NOTE — TELEPHONE ENCOUNTER
#[phone]  Pt states she is only sleeping 3 hours a night and would like an appt to discuss increasing the dosage of Ambien. Pt states the arthritis is also getting worse in her shoulders and knees. She just had an infusion (?) and is still having the pain.

## 2020-08-25 NOTE — TELEPHONE ENCOUNTER
Eleazar Tello, DO  You Just now (12:00 PM)      Add melatonin 5 mg. Shima Forward her age, not approved to increase dose of ambien above 5 mg. Message text      Pt notified.

## 2020-09-28 ENCOUNTER — OFFICE VISIT (OUTPATIENT)
Dept: INTERNAL MEDICINE CLINIC | Age: 70
End: 2020-09-28
Payer: MEDICARE

## 2020-09-28 VITALS
RESPIRATION RATE: 18 BRPM | OXYGEN SATURATION: 100 % | WEIGHT: 186 LBS | HEIGHT: 65 IN | TEMPERATURE: 96.8 F | BODY MASS INDEX: 30.99 KG/M2 | HEART RATE: 99 BPM | SYSTOLIC BLOOD PRESSURE: 119 MMHG | DIASTOLIC BLOOD PRESSURE: 83 MMHG

## 2020-09-28 DIAGNOSIS — Z23 ENCOUNTER FOR IMMUNIZATION: ICD-10-CM

## 2020-09-28 DIAGNOSIS — G47.00 INSOMNIA, UNSPECIFIED TYPE: ICD-10-CM

## 2020-09-28 DIAGNOSIS — M51.36 DEGENERATIVE DISC DISEASE, LUMBAR: ICD-10-CM

## 2020-09-28 DIAGNOSIS — M06.9 RHEUMATOID ARTHRITIS, INVOLVING UNSPECIFIED SITE, UNSPECIFIED RHEUMATOID FACTOR PRESENCE: Primary | ICD-10-CM

## 2020-09-28 DIAGNOSIS — C85.91 NON-HODGKIN LYMPHOMA OF LYMPH NODES OF NECK, UNSPECIFIED NON-HODGKIN LYMPHOMA TYPE (HCC): ICD-10-CM

## 2020-09-28 DIAGNOSIS — Z94.0 KIDNEY TRANSPLANT RECIPIENT: ICD-10-CM

## 2020-09-28 PROCEDURE — G8427 DOCREV CUR MEDS BY ELIG CLIN: HCPCS | Performed by: INTERNAL MEDICINE

## 2020-09-28 PROCEDURE — G0009 ADMIN PNEUMOCOCCAL VACCINE: HCPCS

## 2020-09-28 PROCEDURE — G8399 PT W/DXA RESULTS DOCUMENT: HCPCS | Performed by: INTERNAL MEDICINE

## 2020-09-28 PROCEDURE — 90732 PPSV23 VACC 2 YRS+ SUBQ/IM: CPT

## 2020-09-28 PROCEDURE — 99213 OFFICE O/P EST LOW 20 MIN: CPT | Performed by: INTERNAL MEDICINE

## 2020-09-28 PROCEDURE — 3017F COLORECTAL CA SCREEN DOC REV: CPT | Performed by: INTERNAL MEDICINE

## 2020-09-28 PROCEDURE — G8510 SCR DEP NEG, NO PLAN REQD: HCPCS | Performed by: INTERNAL MEDICINE

## 2020-09-28 PROCEDURE — G8536 NO DOC ELDER MAL SCRN: HCPCS | Performed by: INTERNAL MEDICINE

## 2020-09-28 PROCEDURE — G8417 CALC BMI ABV UP PARAM F/U: HCPCS | Performed by: INTERNAL MEDICINE

## 2020-09-28 PROCEDURE — 1090F PRES/ABSN URINE INCON ASSESS: CPT | Performed by: INTERNAL MEDICINE

## 2020-09-28 PROCEDURE — 1101F PT FALLS ASSESS-DOCD LE1/YR: CPT | Performed by: INTERNAL MEDICINE

## 2020-09-28 PROCEDURE — G9899 SCRN MAM PERF RSLTS DOC: HCPCS | Performed by: INTERNAL MEDICINE

## 2020-09-28 NOTE — PROGRESS NOTES
Zak Li is a 65 y.o. female who presents for evaluation of routine follow up. Last seen by me June 29, 2020 in virtual visit and awv. Overall has done well since then. Doing well with her current meds, has no complaints. Kidney function remained normal when last checked last month. Her only concern is insomnia. Typically only sleeps for 4-5 hours each night. Usually awake by 2 am.  But then, she tends to turn on computer and play games, or maybe read a book.       ROS:  Constitutional: negative for fevers, chills, anorexia and weight loss  Eyes:   negative for visual disturbance and irritation  ENT:   negative for tinnitus,sore throat,nasal congestion,ear pain,hoarseness  Respiratory:  negative for cough, hemoptysis, dyspnea,wheezing  CV:   negative for chest pain, palpitations, lower extremity edema  GI:   negative for nausea, vomiting, diarrhea, abdominal pain,melena  Genitourinary: negative for frequency, dysuria and hematuria  Musculoskel: negative for myalgias, arthralgias, back pain, muscle weakness, joint pain  Neurological:  negative for headaches, dizziness, focal weakness, numbness  Psychiatric:     Negative for depression or anxiety      Past Medical History:   Diagnosis Date    [facility] [address]     Chronic kidney disease        Past Surgical History:   Procedure Laterality Date    HX GYN      hysterectomy     HX TRANSPLANT  04/03/1997    kidney       Family History   Problem Relation Age of Onset    Diabetes Mother        Social History     Socioeconomic History    Marital status:      Spouse name: Not on file    Number of children: Not on file    Years of education: Not on file    Highest education level: Not on file   Occupational History    Not on file   Social Needs    Financial resource strain: Not on file    Food insecurity     Worry: Not on file     Inability: Not on file    Transportation needs     Medical: Not on file     Non-medical: Not on file   Tobacco Use  Smoking status: Never Smoker    Smokeless tobacco: Never Used   Substance and Sexual Activity    Alcohol use: Not Currently     Frequency: Never    Drug use: Never    Sexual activity: Not Currently   Lifestyle    Physical activity     Days per week: Not on file     Minutes per session: Not on file    Stress: Not on file   Relationships    Social connections     Talks on phone: Not on file     Gets together: Not on file     Attends Hindu service: Not on file     Active member of club or organization: Not on file     Attends meetings of clubs or organizations: Not on file     Relationship status: Not on file    Intimate partner violence     Fear of current or ex partner: Not on file     Emotionally abused: Not on file     Physically abused: Not on file     Forced sexual activity: Not on file   Other Topics Concern    Not on file   Social History Narrative    Not on file            Visit Vitals  /83 (BP 1 Location: Left arm, BP Patient Position: Sitting)   Pulse 99   Temp 96.8 °F (36 °C) (Temporal)   Resp 18   Ht 5' 5\" (1.651 m)   Wt 186 lb (84.4 kg)   SpO2 100% Comment: RA   BMI 30.95 kg/m²       Physical Examination:   General - Well appearing female  HEENT - PERRL, TM no erythema/opacification, normal nasal turbinates, no oropharyngeal erythema or exudate, MMM  Neck - supple, no bruits, no thyroidomegaly, no lymphadenopathy  Pulm - clear to auscultation bilaterally  Cardio - RRR, normal S1 S2, no murmur  Abd - soft, nontender, no masses, no HSM  Extrem - no edema, +2 distal pulses  Neuro-  No focal deficits, CN intact     Assessment/Plan:    1. Insomnia--continue ambien. Info given on sleep hygiene  2. RA--on prednisone  3. Hx kidney transplant recipient--on prednisone, cyclosporine. Kidney function remains normal  4. Hx NHL--treated  5. Lumbar DDD--supportive care when needed    Had colon, ? Dr Lois Roque  Had eye exam on [address], [place] to [employer]    Pneumovax 23 given today.   rtc 6 months        Arley Leak III, DO

## 2020-09-28 NOTE — PATIENT INSTRUCTIONS
Insomnia: Care Instructions  Your Care Instructions     Insomnia is the inability to sleep well. It is a common problem for most people at some time. Insomnia may make it hard for you to get to sleep, stay asleep, or sleep as long as you need to. This can make you tired and grouchy during the day. It can also make you forgetful, less effective at work, and unhappy. Insomnia can be caused by conditions such as depression or anxiety. Pain can also affect your ability to sleep. When these problems are solved, the insomnia usually clears up. But sometimes bad sleep habits can cause insomnia. If insomnia is affecting your work or your enjoyment of life, you can take steps to improve your sleep. Follow-up care is a key part of your treatment and safety. Be sure to make and go to all appointments, and call your doctor if you are having problems. It's also a good idea to know your test results and keep a list of the medicines you take. How can you care for yourself at home? What to avoid   · Do not have drinks with caffeine, such as coffee or black tea, for 8 hours before bed. · Do not smoke or use other types of tobacco near bedtime. Nicotine is a stimulant and can keep you awake. · Avoid drinking alcohol late in the evening, because it can cause you to wake in the middle of the night. · Do not eat a big meal close to bedtime. If you are hungry, eat a light snack. · Do not drink a lot of water close to bedtime, because the need to urinate may wake you up during the night. · Do not read or watch TV in bed. Use the bed only for sleeping and sexual activity. What to try   · Go to bed at the same time every night, and wake up at the same time every morning. Do not take naps during the day. · Keep your bedroom quiet, dark, and cool. · Sleep on a comfortable pillow and mattress. · If watching the clock makes you anxious, turn it facing away from you so you cannot see the time.   · If you worry when you lie down, start a worry book. Well before bedtime, write down your worries, and then set the book and your concerns aside. · Try meditation or other relaxation techniques before you go to bed. · If you cannot fall asleep, get up and go to another room until you feel sleepy. Do something relaxing. Repeat your bedtime routine before you go to bed again. · Make your house quiet and calm about an hour before bedtime. Turn down the lights, turn off the TV, log off the computer, and turn down the volume on music. This can help you relax after a busy day. When should you call for help? Watch closely for changes in your health, and be sure to contact your doctor if:    · Your efforts to improve your sleep do not work.     · Your insomnia gets worse.     · You have been feeling down, depressed, or hopeless or have lost interest in things that you usually enjoy. Where can you learn more? Go to http://erinArctic Diagnosticsdemetria.info/  Enter P513 in the search box to learn more about \"Insomnia: Care Instructions. \"  Current as of: December 16, 2019               Content Version: 12.6  © 1942-1861 [employer]. Care instructions adapted under license by [employer] (which disclaims liability or warranty for this information). If you have questions about a medical condition or this instruction, always ask your healthcare professional. [address] any warranty or liability for your use of this information. Learning About Sleeping Well  What does sleeping well mean? Sleeping well means getting enough sleep. How much sleep is enough varies among people. The number of hours you sleep is not as important as how you feel when you wake up. If you do not feel refreshed, you probably need more sleep. Another sign of not getting enough sleep is feeling tired during the day. The average total nightly sleep time is 7½ to 8 hours.  Healthy adults may need a little more or a little less than this. Why is getting enough sleep important? Getting enough quality sleep is a basic part of good health. When your sleep suffers, your mood and your thoughts can suffer too. You may find yourself feeling more grumpy or stressed. Not getting enough sleep also can lead to serious problems, including injury, accidents, anxiety, and depression. What might cause poor sleeping? Many things can cause sleep problems, including:  · Stress. Stress can be caused by fear about a single event, such as giving a speech. Or you may have ongoing stress, such as worry about work or school. · Depression, anxiety, and other mental or emotional conditions. · Changes in your sleep habits or surroundings. This includes changes that happen where you sleep, such as noise, light, or sleeping in a different bed. It also includes changes in your sleep pattern, such as having jet lag or working a late shift. · Health problems, such as pain, breathing problems, and restless legs syndrome. · Lack of regular exercise. How can you help yourself? Here are some tips that may help you sleep more soundly and wake up feeling more refreshed. Your sleeping area   · Use your bedroom only for sleeping and sex. A bit of light reading may help you fall asleep. But if it doesn't, do your reading elsewhere in the house. Don't watch TV in bed. · Be sure your bed is big enough to stretch out comfortably, especially if you have a sleep partner. · Keep your bedroom quiet, dark, and cool. Use curtains, blinds, or a sleep mask to block out light. To block out noise, use earplugs, soothing music, or a \"white noise\" machine. Your evening and bedtime routine   · Create a relaxing bedtime routine. You might want to take a warm shower or bath, listen to soothing music, or drink a cup of noncaffeinated tea. · Go to bed at the same time every night. And get up at the same time every morning, even if you feel tired.   What to avoid   · Limit caffeine (coffee, tea, caffeinated sodas) during the day, and don't have any for at least 4 to 6 hours before bedtime. · Don't drink alcohol before bedtime. Alcohol can cause you to wake up more often during the night. · Don't smoke or use tobacco, especially in the evening. Nicotine can keep you awake. · Don't take naps during the day, especially close to bedtime. · Don't lie in bed awake for too long. If you can't fall asleep, or if you wake up in the middle of the night and can't get back to sleep within 15 minutes or so, get out of bed and go to another room until you feel sleepy. · Don't take medicine right before bed that may keep you awake or make you feel hyper or energized. Your doctor can tell you if your medicine may do this and if you can take it earlier in the day. If you can't sleep   · Imagine yourself in a peaceful, pleasant scene. Focus on the details and feelings of being in a place that is relaxing. · Get up and do a quiet or boring activity until you feel sleepy. · Don't drink any liquids after 6 p.m. if you wake up often because you have to go to the bathroom. Where can you learn more? Go to http://erin-demetria.info/  Enter G252 in the search box to learn more about \"Learning About Sleeping Well. \"  Current as of: January 31, 2020               Content Version: 12.6  © 4399-9036 [employer], [facility]. Care instructions adapted under license by [employer] (which disclaims liability or warranty for this information). If you have questions about a medical condition or this instruction, always ask your healthcare professional. [address] any warranty or liability for your use of this information. If you remember who did your eye exam, or if you have a copy of the report, let us know.

## 2020-10-16 DIAGNOSIS — G47.00 INSOMNIA, UNSPECIFIED TYPE: ICD-10-CM

## 2020-10-16 NOTE — TELEPHONE ENCOUNTER
Faustina Line, [address] [address] Office Pool               General Message/Vendor Calls     Caller's first and last name: Mary Ellen Agustin       Reason for call: Pt's mail order rx service faxed over a request for refills.  Requesting a call back to check status of request.       Callback required yes/no and why: yes       Best contact number(s):Home: [phone] or Mobile: [phone]       Details to clarify the request: Brandon Herman

## 2020-10-21 NOTE — TELEPHONE ENCOUNTER
#[phone] daughter, Diony Ribeiro states she has not had a call back since 10-16-20 pertaining to her mom's scripts that needed to be sent to [facility] Rx mail order. She did not have names of medications in first note and didn't have the names this time. Diony Ribeiro is expecting a call back today to get this straight.

## 2020-10-21 NOTE — TELEPHONE ENCOUNTER
Notified Carlos Manuel refills will be pended to Dr. Roxy Ahuja verbalized understanding of information discussed w/ no further questions at this time. Attempted to call Savanna Erazo back regarding request for trazodone. Notified Carlos Manuel via voicemail, trazodone was d/c in in May d/t no longer effective per pt. Instructed [facility] [place] was prescribed to replace trazodone and should not be given together.

## 2020-10-22 ENCOUNTER — TELEPHONE (OUTPATIENT)
Dept: INTERNAL MEDICINE CLINIC | Age: 70
End: 2020-10-22

## 2020-10-22 RX ORDER — PREDNISONE 5 MG/1
TABLET ORAL
Qty: 90 TAB | Refills: 0 | Status: SHIPPED | OUTPATIENT
Start: 2020-10-22 | End: 2020-11-17 | Stop reason: SDUPTHER

## 2020-10-22 RX ORDER — ZOLPIDEM TARTRATE 5 MG/1
TABLET ORAL
Qty: 30 TAB | Refills: 0 | Status: SHIPPED | OUTPATIENT
Start: 2020-10-22 | End: 2020-11-23 | Stop reason: SDUPTHER

## 2020-10-22 RX ORDER — SODIUM BICARBONATE 650 MG/1
TABLET ORAL
Qty: 360 TAB | Refills: 0 | Status: SHIPPED | OUTPATIENT
Start: 2020-10-22 | End: 2021-02-25 | Stop reason: SDUPTHER

## 2020-10-22 NOTE — TELEPHONE ENCOUNTER
Patient's daughter, Farhad Lovell states she needs to get Zolpidem (AMBIEN) 5 mg tablet re-sent to [facility] as this Prescription was not received that was sent Yesterday, 10/22/20. All other prescriptions sent yesterday were received. Please call if any questions or when done.  Thank you

## 2020-11-16 ENCOUNTER — HOSPITAL ENCOUNTER (OUTPATIENT)
Dept: MAMMOGRAPHY | Age: 70
Discharge: HOME | End: 2020-11-16
Attending: INTERNAL MEDICINE
Payer: MEDICARE

## 2020-11-16 DIAGNOSIS — Z12.31 VISIT FOR SCREENING MAMMOGRAM: ICD-10-CM

## 2020-11-16 PROCEDURE — 77067 SCR MAMMO BI INCL CAD: CPT

## 2020-11-17 RX ORDER — PREDNISONE 5 MG/1
TABLET ORAL
Qty: 90 TAB | Refills: 3 | Status: SHIPPED | OUTPATIENT
Start: 2020-11-17 | End: 2020-11-25 | Stop reason: SDUPTHER

## 2020-11-17 NOTE — TELEPHONE ENCOUNTER
Future Appointments:  Future Appointments   Date Time Provider Yoseph Carter   2/15/2021 10:00 AM B4 PEDS [facility]    8/16/2021 10:00 AM B4 PEDS [facility]        Last Appointment With Me:  9/28/2020     Requested Prescriptions     Pending Prescriptions Disp Refills    predniSONE (DELTASONE) 5 mg tablet 90 Tab 0     Sig: TAKE 1 TABLET BY MOUTH ONCE DAILY

## 2020-11-18 ENCOUNTER — TELEPHONE (OUTPATIENT)
Dept: INTERNAL MEDICINE CLINIC | Age: 70
End: 2020-11-18

## 2020-11-18 NOTE — TELEPHONE ENCOUNTER
[facility] states pt is getting Ambien filled with mail order and at their pharmacy. Please call [facility] to let them know if this needs to be filled.

## 2020-11-25 RX ORDER — PREDNISONE 5 MG/1
TABLET ORAL
Qty: 90 TAB | Refills: 3 | Status: SHIPPED | OUTPATIENT
Start: 2020-11-25 | End: 2021-10-12 | Stop reason: SDUPTHER

## 2020-11-25 NOTE — TELEPHONE ENCOUNTER
Future Appointments:  Future Appointments   Date Time Provider Yoseph Carter   2/15/2021 10:00 AM B4 PEDS [facility]    8/16/2021 10:00 AM B4 PEDS [facility]        Last Appointment With Me:  9/28/2020     Requested Prescriptions     Pending Prescriptions Disp Refills    predniSONE (DELTASONE) 5 mg tablet 90 Tab 3     Sig: TAKE 1 TABLET BY MOUTH ONCE DAILY

## 2020-12-03 ENCOUNTER — TELEPHONE (OUTPATIENT)
Dept: INTERNAL MEDICINE CLINIC | Age: 70
End: 2020-12-03

## 2020-12-03 NOTE — TELEPHONE ENCOUNTER
Patient's daughter, Maya Diaz, states she needs a call back in reference to getting an Acute appt for patient to be seen for Memory issues & also for Rheumatoid Arthritis. Please call.  Thank you

## 2020-12-04 NOTE — TELEPHONE ENCOUNTER
Appointment scheduled for 12/17 @ 130 with Dr. Tori Loomis verbalized understanding of information discussed w/ no further questions at this time.

## 2020-12-17 ENCOUNTER — OFFICE VISIT (OUTPATIENT)
Dept: INTERNAL MEDICINE CLINIC | Age: 70
End: 2020-12-17
Payer: MEDICARE

## 2020-12-17 ENCOUNTER — TELEPHONE (OUTPATIENT)
Dept: INTERNAL MEDICINE CLINIC | Age: 70
End: 2020-12-17

## 2020-12-17 VITALS
OXYGEN SATURATION: 99 % | HEART RATE: 98 BPM | TEMPERATURE: 96.7 F | HEIGHT: 65 IN | BODY MASS INDEX: 27.39 KG/M2 | SYSTOLIC BLOOD PRESSURE: 125 MMHG | WEIGHT: 164.4 LBS | DIASTOLIC BLOOD PRESSURE: 84 MMHG | RESPIRATION RATE: 14 BRPM

## 2020-12-17 DIAGNOSIS — R68.89 FORGETFULNESS: Primary | ICD-10-CM

## 2020-12-17 DIAGNOSIS — M06.9 RHEUMATOID ARTHRITIS INVOLVING SHOULDER, UNSPECIFIED LATERALITY, UNSPECIFIED WHETHER RHEUMATOID FACTOR PRESENT (HCC): ICD-10-CM

## 2020-12-17 DIAGNOSIS — C85.90 NON-HODGKIN'S LYMPHOMA, UNSPECIFIED BODY REGION, UNSPECIFIED NON-HODGKIN LYMPHOMA TYPE (HCC): ICD-10-CM

## 2020-12-17 DIAGNOSIS — Z94.0 KIDNEY TRANSPLANT RECIPIENT: ICD-10-CM

## 2020-12-17 PROCEDURE — G8536 NO DOC ELDER MAL SCRN: HCPCS | Performed by: INTERNAL MEDICINE

## 2020-12-17 PROCEDURE — G8427 DOCREV CUR MEDS BY ELIG CLIN: HCPCS | Performed by: INTERNAL MEDICINE

## 2020-12-17 PROCEDURE — G8432 DEP SCR NOT DOC, RNG: HCPCS | Performed by: INTERNAL MEDICINE

## 2020-12-17 PROCEDURE — 3017F COLORECTAL CA SCREEN DOC REV: CPT | Performed by: INTERNAL MEDICINE

## 2020-12-17 PROCEDURE — G9899 SCRN MAM PERF RSLTS DOC: HCPCS | Performed by: INTERNAL MEDICINE

## 2020-12-17 PROCEDURE — G8417 CALC BMI ABV UP PARAM F/U: HCPCS | Performed by: INTERNAL MEDICINE

## 2020-12-17 PROCEDURE — 99214 OFFICE O/P EST MOD 30 MIN: CPT | Performed by: INTERNAL MEDICINE

## 2020-12-17 PROCEDURE — G8399 PT W/DXA RESULTS DOCUMENT: HCPCS | Performed by: INTERNAL MEDICINE

## 2020-12-17 PROCEDURE — 1090F PRES/ABSN URINE INCON ASSESS: CPT | Performed by: INTERNAL MEDICINE

## 2020-12-17 PROCEDURE — 1101F PT FALLS ASSESS-DOCD LE1/YR: CPT | Performed by: INTERNAL MEDICINE

## 2020-12-17 RX ORDER — DONEPEZIL HYDROCHLORIDE 5 MG/1
5 TABLET, FILM COATED ORAL
Qty: 90 TAB | Refills: 3 | Status: SHIPPED | OUTPATIENT
Start: 2020-12-17 | End: 2021-03-19 | Stop reason: ALTCHOICE

## 2020-12-17 RX ORDER — CELECOXIB 200 MG/1
200 CAPSULE ORAL 2 TIMES DAILY
Qty: 60 CAP | Refills: 2 | Status: SHIPPED | OUTPATIENT
Start: 2020-12-17 | End: 2020-12-18

## 2020-12-17 RX ORDER — CELECOXIB 200 MG/1
200 CAPSULE ORAL 2 TIMES DAILY
Qty: 180 CAP | Refills: 3 | Status: SHIPPED | OUTPATIENT
Start: 2020-12-17 | End: 2020-12-18

## 2020-12-17 NOTE — TELEPHONE ENCOUNTER
----- Message from Jules Garvey sent at 12/17/2020  2:43 PM EST -----  Regarding: Dr. [facility] [place] Message/Vendor Calls    Caller's first and last name: Laila with [facility]       Reason for call: Rx possible conflict       Callback required yes/no and why: Yes, clarification       Best contact number(s): [phone]      Details to clarify the request: Needs to speak with PCP or nurse to talk about pt Rx Celecoxibin ref to transplant patients.        Tiara L Toussaint     Copy/paste [place]

## 2020-12-17 NOTE — PATIENT INSTRUCTIONS
Rheumatoid Arthritis: Care Instructions  Your Care Instructions     Arthritis is a common health problem in which the joints are inflamed. There are many types of arthritis. In rheumatoid arthritis, the body's own immune system attacks the joints. This causes pain, stiffness, and swelling in the joints, especially in the hands and feet. It can become hard to open jars, write, and do other daily tasks. Sometimes rheumatoid arthritis can also cause bumps to form under the skin. Over time, rheumatoid arthritis can damage and deform joints. Early treatment with medicines may reduce your chances of having a lasting disability. Follow-up care is a key part of your treatment and safety. Be sure to make and go to all appointments, and call your doctor if you are having problems. It's also a good idea to know your test results and keep a list of the medicines you take. How can you care for yourself at home? · If your doctor recommends it, get more exercise. Walking is a good choice. If your knees or ankles hurt, try riding a stationary bike or swimming. · Move each joint gently through its full range of motion once or twice a day. · Rest joints when they are sore or overworked. Short rest breaks may help more than staying in bed. · Reach and stay at a healthy weight. Regular exercise and a healthy diet will help you do this. Extra weight can strain the joints, especially the knees and hips, and make the pain worse. Losing even a few pounds may help. · Get enough calcium and vitamin D to help prevent osteoporosis, which causes thin bones. Talk to your doctor about how much you should take. · Protect your joints from injury. Do not overuse them. Try to limit or avoid activities that cause joint pain or swelling. Use special kitchen tools and other self-help devices as well as walkers, splints, or canes if needed. · Use heat to ease pain. Take warm showers or baths. Use hot packs or a heating pad set on low. Sleep under a warm electric blanket. · Put ice or a cold pack on the area for 10 to 20 minutes at a time. Put a thin cloth between the ice and your skin. · Take pain medicines exactly as directed. ? If the doctor gave you a prescription medicine for pain, take it as prescribed. ? If you are not taking a prescription pain medicine, ask your doctor if you can take an over-the-counter medicine. · Take an active role in managing your condition. Set up a treatment plan with your doctor, and learn as much as you can about rheumatoid arthritis. This will help you control pain and stay active. When should you call for help? Call your doctor now or seek immediate medical care if:    · You have a fever or a rash along with joint pain.     · You have joint pain that is so severe that you cannot use the joint at all.     · You have sudden swelling, redness, or pain in one or more joints, and you do not know why.     · You have back or neck pain along with weakness in your arms or legs.     · You have a loss of bowel or bladder control. Watch closely for changes in your health, and be sure to contact your doctor if:    · You have joint pain that lasts for more than 6 weeks.     · You have side effects from your arthritis medicines, such as stomach pain, nausea, heartburn, or dark and tarlike stools. Where can you learn more? Go to http://www.gray.com/  Enter K205 in the search box to learn more about \"Rheumatoid Arthritis: Care Instructions. \"  Current as of: December 9, 2019               Content Version: 12.6  © 5079-3525 [employer]. Care instructions adapted under license by [employer] (which disclaims liability or warranty for this information). If you have questions about a medical condition or this instruction, always ask your healthcare professional. [address] [address] any warranty or liability for your use of this information.

## 2020-12-17 NOTE — PROGRESS NOTES
Huseyin Tolbert is a 65 y.o. female who presents for evaluation of worsening memory and diffuse arthritis pains. Last seen by me sept 28, 2020. Daughter with her today, who collaborates that pts memory has been worsening dramatically over the past few months. She has a strong family hx of dementia. She also has RA, and has been struggling with lots of joint pains, worse in left shoulder. Has never taken any meds for arthritis, nor has she seen rheumatology. She has adjusted her diet, lost another 22 lbs since sept. Down about 40 over past year. ROS:  Constitutional: negative for fevers, chills, anorexia and weight loss  Eyes:   negative for visual disturbance and irritation  ENT:   negative for tinnitus,sore throat,nasal congestion,ear pain,hoarseness  Respiratory:  negative for cough, hemoptysis, dyspnea,wheezing  CV:   negative for chest pain, palpitations, lower extremity edema  GI:   negative for nausea, vomiting, diarrhea, abdominal pain,melena  Genitourinary: negative for frequency, dysuria and hematuria  Musculoskel: negative for myalgias, arthralgias, back pain, muscle weakness.   ++left shoulder, both hips, knees joint pain  Neurological:  negative for headaches, dizziness, focal weakness, numbness  Psychiatric:     Negative for depression or anxiety      Past Medical History:   Diagnosis Date    Cancer ([facility] [address]     Chronic kidney disease        Past Surgical History:   Procedure Laterality Date    HX GYN      hysterectomy     HX TRANSPLANT  04/03/1997    kidney       Family History   Problem Relation Age of Onset    Diabetes Mother        Social History     Socioeconomic History    Marital status:      Spouse name: Not on file    Number of children: Not on file    Years of education: Not on file    Highest education level: Not on file   Occupational History    Not on file   Social Needs    Financial resource strain: Not on file    Food insecurity     Worry: Not on file Inability: Not on file    Transportation needs     Medical: Not on file     Non-medical: Not on file   Tobacco Use    Smoking status: Never Smoker    Smokeless tobacco: Never Used   Substance and Sexual Activity    Alcohol use: Not Currently     Frequency: Never    Drug use: Never    Sexual activity: Not Currently   Lifestyle    Physical activity     Days per week: Not on file     Minutes per session: Not on file    Stress: Not on file   Relationships    Social connections     Talks on phone: Not on file     Gets together: Not on file     Attends Rastafari service: Not on file     Active member of club or organization: Not on file     Attends meetings of clubs or organizations: Not on file     Relationship status: Not on file    Intimate partner violence     Fear of current or ex partner: Not on file     Emotionally abused: Not on file     Physically abused: Not on file     Forced sexual activity: Not on file   Other Topics Concern    Not on file   Social History Narrative    Not on file            Visit Vitals  /84 (BP 1 Location: Left arm, BP Patient Position: Sitting)   Pulse 98   Temp (!) 96.7 °F (35.9 °C) (Temporal)   Resp 14   Ht 5' 5\" (1.651 m)   Wt 164 lb 6.4 oz (74.6 kg)   SpO2 99%   BMI 27.36 kg/m²       Physical Examination:   General - Well appearing female  HEENT - PERRL, TM no erythema/opacification, normal nasal turbinates, no oropharyngeal erythema or exudate, MMM  Neck - supple, no bruits, no thyroidomegaly, no lymphadenopathy  Pulm - clear to auscultation bilaterally  Cardio - RRR, normal S1 S2, no murmur  Abd - soft, nontender, no masses, no HSM  Extrem - no edema, +2 distal pulses  Neuro-  No focal deficits, CN intact     Assessment/Plan:    1. Forgetfulness--rx to start aricept. Referral to neuropsych, dr Hayden Thompson, for formal testing  2.  RA diffuse arthritis--rx to start celebrex, referral to rheum, dr Camacho Kahn.   With her hx of kidney transplant, if her pain persists, she would be better served not on nsaids long term. 3.  Hx kidney transplant recipient--on prednisone, cyclosporine, bicarbonate  4. Hx NHL--    rtc 3 months, check labs then.         Hawk Weller III, DO

## 2020-12-18 ENCOUNTER — TELEPHONE (OUTPATIENT)
Dept: INTERNAL MEDICINE CLINIC | Age: 70
End: 2020-12-18

## 2020-12-18 NOTE — TELEPHONE ENCOUNTER
[facility]'s medications [facility]   [address] does not begin until Jan. 1. Thanks.

## 2020-12-22 ENCOUNTER — TELEPHONE (OUTPATIENT)
Dept: NEUROLOGY | Age: 70
End: 2020-12-22

## 2020-12-22 NOTE — TELEPHONE ENCOUNTER
----- Message from [place] sent at 12/22/2020 10:23 AM EST -----  Regarding: Dr. Micheal Wynn  Appointment not available    Caller's first and last name and relationship to patient (if not the patient):  Chucky Olivo (Daughter)      Best contact number:  [phone]      Preferred date and time:  Next available       Scheduled appointment date and time:  N/A      Reason for appointment:  New patient evaluation and testing      Details to clarify the request:  Chucky Olivo is requesting a call back to schedule a new patient evaluation and testing for memory loss. She was referred by Dr. Hayley Carlson, her PCP.         Jovan

## 2020-12-23 ENCOUNTER — TELEPHONE (OUTPATIENT)
Dept: INTERNAL MEDICINE CLINIC | Age: 70
End: 2020-12-23

## 2020-12-23 NOTE — TELEPHONE ENCOUNTER
----- Message from Maira Treviño sent at 12/23/2020  4:38 PM EST -----  Regarding: Dr. Glass Must Message/Vendor Calls    Caller's first and last name: Brennan Odell, daughter      Reason for call: Advising they have been trying since Friday but have not been unable to schedule an appointment with referred doctor, Dr. Tyrone Crowder,  for memory loss (office will not return call).     Callback required yes/no and why: Yes      Best contact number(s): [phone]      Details to clarify the request:      Message from [facility]

## 2020-12-28 ENCOUNTER — TELEPHONE (OUTPATIENT)
Dept: INTERNAL MEDICINE CLINIC | Age: 70
End: 2020-12-28

## 2020-12-28 DIAGNOSIS — G47.00 INSOMNIA, UNSPECIFIED TYPE: ICD-10-CM

## 2020-12-29 NOTE — TELEPHONE ENCOUNTER
----- Message from Neel Fenton sent at 12/29/2020  3:10 PM EST -----  Regarding: /telephone  Contact: [phone]  General Message/Vendor Calls    Caller's first and last name:[facility]      Reason for call: Checking on request sent for \"Zolpidem\" 5mg. They have not received a response. Callback required yes/no and why:Yes to give refill response.        Best contact number(s):[phone]      Message from [facility]

## 2020-12-31 DIAGNOSIS — G47.00 INSOMNIA, UNSPECIFIED TYPE: ICD-10-CM

## 2020-12-31 RX ORDER — ZOLPIDEM TARTRATE 5 MG/1
TABLET ORAL
Qty: 30 TAB | Refills: 0 | Status: SHIPPED | OUTPATIENT
Start: 2020-12-31 | End: 2021-01-15 | Stop reason: SDUPTHER

## 2020-12-31 NOTE — TELEPHONE ENCOUNTER
Future Appointments:  Future Appointments   Date Time Provider Yoseph Carter   1/14/2021  9:00 AM Tyesha Seo, PHD [facility] AMB   2/1/2021 10:00 AM Nate Nugent MD [facility] AMB   2/15/2021 10:00 AM B4 PEDS [facility]   3/19/2021  2:00 PM Mackenzie Maher III, DO [facility] AMB   8/16/2021 10:00 AM B4 PEDS [facility]         Last Appointment With Me:  12/17/2020     Requested Prescriptions      No prescriptions requested or ordered in this encounter

## 2021-01-14 ENCOUNTER — OFFICE VISIT (OUTPATIENT)
Dept: NEUROLOGY | Age: 71
End: 2021-01-14
Payer: MEDICARE

## 2021-01-14 DIAGNOSIS — F41.9 ANXIOUSNESS: ICD-10-CM

## 2021-01-14 DIAGNOSIS — R41.3 MEMORY LOSS: Primary | ICD-10-CM

## 2021-01-14 PROCEDURE — 96116 NUBHVL XM PHYS/QHP 1ST HR: CPT | Performed by: PSYCHOLOGIST

## 2021-01-14 NOTE — PROGRESS NOTES
This note will not be viewable in [employer] for the following reason(s). Likely risk of substantial harm from the misinterpretation of data generated by this evaluation. [facility] at [place] [address], [address] [place] [address]    Office:  [phone]  Fax: [phone]               Initial Office Exam    Patient Name: Christina Rosas  Age: 65 y.o. Gender: female   Occupation:  [employer]  Handedness: right handed   Presenting Concern: Memory loss  Primary Care Physician: Teena Glez DO  Referring Provider: Teena Glez DO      REASON FOR REFERRAL:  This comprehensive and medically necessary neuropsychological assessment was requested to assist with a differential diagnosis of dementia. The use and purpose of this examination, as well as the extent and limitations of confidentiality, were explained prior to obtaining permission to participate. Instructions were provided regarding the necessity to put forth optimal effort and answer questions truthfully in order to obtain reliable and accurate test results. PERTINENT HISTORY:  Ms. Nathalia Evans presented for a neuropsychological assessment at the recommendation of her treating physician secondary to complaints of memory loss. She has reported symptoms that include forgetfulness, decreased attention, getting loss in conversation, poor sleep, feeling slowed down, increased irritability, and easily frustrated. Ms. Nathalia Evans began noticing symptoms, which has gotten progressively worse in the past 6 months. From a brief review of her medical and personal history there has not been any other significant neurological injury or illness noted or reported. She did not report experiencing depression or anxiety in the past.      Ms. Nathalia Evans does not  report any problems at birth or difficulties meeting developmental milestones.  She reports that she had an adequate level of family support and was not subject to trauma or abuse as a child. Ms. Rui Graham does not report being retain in school or receiving special assistance in any of she classes or subjects. Ms. Rui Graham completed 10 years of education. Ms. Rui Graham does not  exercise on a regular basis and does  maintain a balanced diet. She does  report problems with sleep and does  complain of pain. She does  participate in mentally stimulating activities. Ms. Rui Graham does  have concerns regarding family members. Ms. Rui Graham indicated that she is independent in her instrumental activities of daily living, including shopping, meal preparation, housekeeping, doing laundry, driving a car, managing medications, and finances. Current Outpatient Medications   Medication Sig    zolpidem (AMBIEN) 5 mg tablet TAKE 1 TABLET BY MOUTH EVERY DAY NIGHTLY FOR SLEEP    donepeziL (ARICEPT) 5 mg tablet Take 1 Tab by mouth nightly.  predniSONE (DELTASONE) 5 mg tablet TAKE 1 TABLET BY MOUTH ONCE DAILY    sodium bicarbonate 650 mg tablet TAKE 1 TABLET BY MOUTH FOUR TIMES A DAY    cycloSPORINE (SandIMMUNE) 25 mg capsule Take 3 Caps by mouth two (2) times a day. Dx code: Z80.0    calcium-cholecalciferol, d3, (CALCIUM 600 + D) 600-125 mg-unit tab Take 600 mg by mouth two (2) times a day. No current facility-administered medications for this visit. Past Medical History:   Diagnosis Date    Cancer [facility])     Chronic kidney disease        No flowsheet data found.     No data recorded    Past Surgical History:   Procedure Laterality Date    HX GYN      hysterectomy     HX TRANSPLANT  04/03/1997    kidney       Social History     Socioeconomic History    Marital status:      Spouse name: Not on file    Number of children: Not on file    Years of education: Not on file    Highest education level: Not on file   Tobacco Use    Smoking status: Never Smoker    Smokeless tobacco: Never Used   Substance and Sexual Activity    Alcohol use: Not Currently     Frequency: Never    Drug use: Never    Sexual activity: Not Currently       Family History   Problem Relation Age of Onset    Diabetes Mother        CT Results (most recent):  No results found for this or any previous visit. MRI Results (most recent):  Results from [place] encounter on 03/13/20   MRI BRAIN WO CONT    Narrative EXAM:  MRI BRAIN WO CONT  INDICATION:  new off balance and decreased hearing R ear, and sensation change  right face, R 26.89, imbalance, H 91.91, H 93. A1 right ear tinnitus,  TECHNIQUE:  Sagittal T1, axial FLAIR, T2,T1 and gradient echo images as well as coronal T2  weighted images and axial diffusion weighted images of the head were obtained. Additional thin axial T1-weighted images through the posterior fossa/temporal  bones and axial T2 cisternogram through the temporal bone/IACs. COMPARISON:  None available  FINDINGS:  There are bilateral mastoid effusions and some fluid is likely present in the  middle ear cavities. There is relatively thin bone over the mastoid air cells. Some irregularity of CSF spaces and adjacent brain greater on the right raises  the possibility of communication with the CSF and the mastoid. There is no  abnormality in the nasopharynx to explain obstruction of eustachian tube or  other etiologies. Patient is noted to have a chronically enlarged partial empty  sella which can be associated with IIH (idiopathic intracranial hypertension). The left maxillary sinus is completely opacified. Next or other mucosal  thickening and fluid. The fluid has prominent restricted diffusion raising the  possibility/infection in the left maxillary sinus. There may also be similar  findings in the left anterior ethmoid/frontal sinus. Flow voids are present in vertebral basilar and carotid artery systems.   Ventricular size and configuration are normal.  Scattered foci T2 hyperintensity in cerebral white matter without associated  abnormal restricted diffusion. Nonspecific pattern possibly related to chronic  small vessel ischemic change. No evidence of intracranial hemorrhage, mass or abnormal extra-axial fluid  collections. No evidence of brain infarction. Impression IMPRESSION:  1. Bilateral mastoid and possibly middle ear effusions in patient with  chronically enlarged partial empty sella raises possibility of CSF/mastoid  fistula secondary to chronic IIH. Correlate clinically and consider  high-resolution CT temporal bone for further evaluation. 2. Left maxillary sinus greater than left frontal sinus opacification with  restricted diffusion. This may represent infection in the left maxillary sinus. 3. Nonspecific white matter T2 hyperintensity is probably chronic. 23X                MENTAL STATUS:    Orientation:  Fully oriented   Eye Contact:  Appropriate   Motor Behavior:   Ambulates independently with stable gait   Speech:   Fluent and articulate   Thought Process:  Clear and coherent   Thought Content:  No evidence of hallucinations or delusions   Suicidal ideations:  Denies   Mood:   Mildly anxious   Affect:   Congruent with stated mood   Concentration:   Mild decline   Abstraction:   Within normal limits   Insight:   Adequate     On the Modified Mini-Mental Status Exam: 96/100      DIAGNOSTIC IMPRESSIONS:    ICD-10-CM ICD-9-CM    1. Memory loss  R41.3 780.93    2. Anxiousness  F41.9 300.00              PLAN:  1. Complete a comprehensive neuropsychological assessment to provide a differential diagnosis of presenting concerns as well as to assist with disposition and treatment planning as appropriate. 2. Consider compensatory and remedial cognitive training. 99152 x 1 Review of records. Face to face interview w/ patient. Determine test protocol: 60 minutes.  Total 1 unit        Eugenia Mcneill, PhD, ABPP, LCP  Licensed Clinical Psychologist/ Neuropsychologist        This note will not be viewable in MyChart.

## 2021-01-15 DIAGNOSIS — G47.00 INSOMNIA, UNSPECIFIED TYPE: ICD-10-CM

## 2021-01-15 RX ORDER — ZOLPIDEM TARTRATE 5 MG/1
TABLET ORAL
Qty: 90 TAB | Refills: 0 | Status: SHIPPED | OUTPATIENT
Start: 2021-01-15 | End: 2021-04-08 | Stop reason: SDUPTHER

## 2021-01-15 NOTE — TELEPHONE ENCOUNTER
----- Message from Junior Phalen sent at 1/15/2021 12:51 PM EST -----  Regarding: Dr. Grover : [phone]  General Message/Vendor Calls    Caller's first and last name: Reyna Liang, Daughter      Reason for call: Requesting a call be made to [address] [place] [address],[address] and have them change the \"zelphadin\" from a 30 day supply to 90 day supply       Callback required yes/no and why: Yes, confirmation.        Best contact number(s):[phone]      Details to clarify the request: [facility] [employer] #- [phone]      Message from [facility]

## 2021-02-04 ENCOUNTER — OFFICE VISIT (OUTPATIENT)
Dept: NEUROLOGY | Age: 71
End: 2021-02-04
Payer: MEDICARE

## 2021-02-04 DIAGNOSIS — R41.81 AGE-RELATED COGNITIVE DECLINE: Primary | ICD-10-CM

## 2021-02-04 PROCEDURE — 96137 PSYCL/NRPSYC TST PHY/QHP EA: CPT | Performed by: PSYCHOLOGIST

## 2021-02-04 PROCEDURE — 96138 PSYCL/NRPSYC TECH 1ST: CPT | Performed by: PSYCHOLOGIST

## 2021-02-04 PROCEDURE — 96133 NRPSYC TST EVAL PHYS/QHP EA: CPT | Performed by: PSYCHOLOGIST

## 2021-02-04 PROCEDURE — 96136 PSYCL/NRPSYC TST PHY/QHP 1ST: CPT | Performed by: PSYCHOLOGIST

## 2021-02-04 PROCEDURE — 96139 PSYCL/NRPSYC TST TECH EA: CPT | Performed by: PSYCHOLOGIST

## 2021-02-04 PROCEDURE — G8432 DEP SCR NOT DOC, RNG: HCPCS | Performed by: PSYCHOLOGIST

## 2021-02-04 PROCEDURE — 96132 NRPSYC TST EVAL PHYS/QHP 1ST: CPT | Performed by: PSYCHOLOGIST

## 2021-02-04 NOTE — PROGRESS NOTES
This note will not be viewable in [employer] for the following reason(s). Likely risk of substantial harm from the misinterpretation of data generated by this evaluation. [facility] at [address]    Office:  [phone]  Fax: [phone]                                      Neuropsychological Evaluation Report    Patient Name: Dante Mccullough  Age: 65 y.o. Gender: female   Occupation:  [employer]  Handedness: right handed   Presenting Concern: Memory loss  Primary Care Physician: Russell Fritz DO  Referring Provider: Russell Fritz DO     PATIENT HISTORY (OBTAINED DURING INITIAL CLINICAL EVALUATION):    REASON FOR REFERRAL:  This comprehensive and medically necessary neuropsychological assessment was requested to assist with a differential diagnosis of dementia. The use and purpose of this examination, as well as the extent and limitations of confidentiality, were explained prior to obtaining permission to participate. Instructions were provided regarding the necessity to put forth optimal effort and answer questions truthfully in order to obtain reliable and accurate test results.     PERTINENT HISTORY:  Ms. Kendra Arizmendi presented for a neuropsychological assessment at the recommendation of her treating physician secondary to complaints of memory loss. She has reported symptoms that include forgetfulness, decreased attention, getting loss in conversation, poor sleep, feeling slowed down, increased irritability, and easily frustrated. Ms. Kendra Arizmendi began noticing symptoms, which has gotten progressively worse in the past 6 months. From a brief review of her medical and personal history there has not been any other significant neurological injury or illness noted or reported. She did not report experiencing depression or anxiety in the past.       Ms. Kendra Arizmendi does not  report any problems at birth or difficulties meeting developmental milestones. She reports that she had an adequate level of family support and was not subject to trauma or abuse as a child. Ms. Brian Caal does not report being retain in school or receiving special assistance in any of she classes or subjects. Ms. Brian Caal completed 10 years of education.     Ms. Brian Caal does not  exercise on a regular basis and does  maintain a balanced diet. She does  report problems with sleep and does  complain of pain. She does  participate in mentally stimulating activities. Ms. Brian Caal does  have concerns regarding family members. Ms. Brian Caal indicated that she is independent in her instrumental activities of daily living, including shopping, meal preparation, housekeeping, doing laundry, driving a car, managing medications, and finances. METHODS OF ASSESSMENT (Current Evaluation):  Clinician Administered:  Clinical Interview  Review of Medical Records  Clock Drawing Task  Modified Mini-Mental Status Exam (3MS)  Test of Premorbid Functioning  Hospital Anxiety and Depression Scale  Revised Memory and Behavior Checklist    Technician Administered:  NAB: Executive Functions Module 1  RBANS-A  Reliable Digit Span  Test of Memory Malingering  Test of Practical Judgment (9-Item)  [place] Functional Living Scale  Trail Making Test     TEST OBSERVATIONS:  Ms. Brian Caal arrived promptly for the testing session. Dress and grooming were appropriate; physical presentation was unchanged from that observed during the clinical interview. Speech was fluent, intelligible, and goal-directed. Affect was congruent with the euthymic mood conveyed. Ms. Brian Caal was adequately cooperative and appeared to put forth good effort throughout this examination. Rapport with the examiner was adequately established and maintained. Minimal prompting was required. Comprehension of test instructions was not problematic.   Performance motivation was objectively measured by three instruments (TOMM, Reliable Digit Span, RBANS EI), and Ms. Marybel Paulson produced a normal score on three of these measures. Accordingly, test findings below do not appear to be the product of disingenuous effort. Given the above observations, plus comments contained in the Mental Status section, the results of this examination are regarded as reasonably reliable and valid. TEST RESULTS:  Quantitative test results are derived from comparisons to age and education corrected cohort normative data, where applicable. Percentiles are included in these instances. Qualitative test results are determined using clinical observations. General Orientation and Awareness:       Orientation to person, year, month, day of month, day of week, state, town, and circumstance.    Awareness of deficits WNL                   Cognitive performance validity testing  Valid        Attention/Concentration:      Classification:  Coding (32 percentile)           Average  Simple visuomotor tracking (76 percentile)               High Average  Digits forward (42 percentile)                 Average  Digits backward (21 percentile)                 Low Average    Visuospatial and Constructional Praxis:     Figure copy (5 percentile)                                               Mildly Impaired  Line orientation (45 percentile)                                              Average     Language:         Picture naming (70 percentile)                               Average  Semantic fluency (79 percentile)                    High Average    Memory and Learning:       Immediate word list learning (37 percentile)               Average  Delayed word list recall (50 percentile)                  Average  Delayed word list recognition (75 percentile)               High Average  Immediate story memory (66 percentile)                  Average  Delayed story recall (68 percentile)                Average  Delayed figure recall (27 percentile) Average    Cognitive Tests of Executive Functioning:     Trail Making B (27 percentile)                   Average  Mazes (90 percentile)                   High Average  Simple judgment in daily decision making (76 percentile)              High Average  Complex judgment in daily decision making (20 percentile)               Low Average  Categories (82 percentile)                  High Average  Word generation (31 percentile)                    Average    Adaptive Behavioral Measure of Daily Functioning:   Time:    >75 %ile   Money/calculations:     50 %ile  Communication:    >75 %ile   Memory:       50 %ile    Total:     T= 60 (84%ile)      Intellectual and Basic Cognitive Functioning (WAIS-IV):  ACS Test of pre-morbid functioning reading recognition lower limit estimated WAIS-IV FSIQ score:       Estimated full scale IQ:                   26 percentile       Average      Emotional Functioning:   HADS:  Depression = 02   Normal range; Anxiety = 04  Normal range    IMPRESSIONS:  Ms. Angelito Zaragoza was seen for a comprehensive neuropsychological evaluation and administered a battery of measures assessing the neurocognitive domains of attention, visual-spatial, language, memory, and executive functioning. Her overall level of functioning was in the average range, with the neurocognitive domains of attention language, language, memory, executive functioning also being in the average range. The exception was her performance on measures of constructional praxis was was in the mildly impaired range. Her performance on a visual perceptual measure however was in the average range. Her performance on measures of adaptive functioning were in the above average range. These findings are consistent with her estimated premorbid level of functioning. There was no emotional stress reported by Ms. Angelito Zaragoza.     DIAGNOSTIC IMPRESSIONS:    ICD-10-CM ICD-9-CM    1. Age-related cognitive decline  R41.81 294.9 SC NEUROPSYCHOLOGICAL TST EVAL PHYS/QHP 1ST HOUR      VT NEUROPSYCHOLOGICAL TST EVAL PHYS/QHP EA ADDL HR      VT PSYL/NRPSYCL TST PHYS/QHP 2+ TST 1ST 30 MIN      VT PSYCL/NRPSYCL TST PHYS/QHP 2+ TST EA ADDL 30 MIN      VT PSYCL/NRPSYCL TST TECH 2+ TST 1ST 30 MIN      VT PSYCL/NRPSYCL TST TECH 2+ TST EA ADDL 30 MIN      VT PSYCL/NRPSYCL TST TECH 2+ TST EA ADDL 30 MIN      VT PSYCL/NRPSYCL TST TECH 2+ TST EA ADDL 30 MIN      VT PSYCL/NRPSYCL TST TECH 2+ TST EA ADDL 30 MIN      VT PSYCL/NRPSYCL TST TECH 2+ TST EA ADDL 30 MIN         RECOMMENDATIONS:   1. Findings should be reviewed with Ms. Sukhdeep Read to insure her understanding and discuss the potential implications. 2. Emphasis should be placed on Ms. Sukhdeep Read obtaining good sleep hygiene and maintaining adequate physical exercise to promote good brain health. 3.  Ms. Sukhdeep Read is encouraged to seek out various forms of mental stimulation that would help to \"exercise\" her brain. This might include learning a new hobby, reading, socializing with others, eating healthy, and physical excerise. Thank you for allowing me the opportunity to assist you in Ms. Hunt's care. Please do not hesitate to contact me should you have additional questions that I may not have addressed. 26567 x 1  96138 x 1  96139 x 6  96132 x 1  96133 x 2          Kourtney Del Cid, Ph.D., ABPP  Licensed Clinical Psychologist  Neuropsychologist  Board Certified Rehabilitation Psychologist      Time Documentation:     73810 x 1: Neurobehavioral Status Exam/Clinical Interview: (1 hour, (already billed on first date of service)     85316*6 Neuropsych testing/data gathering by Neuropsychologist (35 additional minutes, see above)      96138 x 1  96139 x 6 Test Administration/Data Gathering By Technician: (3.5 hours).  08950 x 1 (first 30 minutes), 22955 x 7 (each additional 30 minutes)     96132 x 1  96133 x 1 Testing Evaluation Services by Neuropsychologist (1 hour, 50 minutes) 96132 x 1 (first hour), 96133 x 1 (50 minutes) Definitions:       79200/75708:  Neurobehavioral Status Exam, Clinical interview. Clinical assessment of thinking, reasoning and judgment, by neuropsychologist, both face to face time with patient and time interpreting those test results and reporting, first and subsequent hours)     96012/18950: Neuropsychological Test Administration by Technician/Psychometrist, first 30 minutes and each additional 30 minutes. The above includes: Record review. Review of history provided by patient. Review of collaborative information. Testing by Clinician. Review of raw data. Scoring. Report writing of individual tests administered by Clinician. Integration of individual tests administered by psychometrist with NSE/testing by clinician, review of records/history/collaborative information, case Conceptualization, treatment planning, clinical decision making, report writing, coordination Of Care. Psychometry test codes as time spent by psychometrist administering and scoring neurocognitive/psychological tests under supervision of neuropsychologist.  Integral services including scoring of raw data, data interpretation, case conceptualization, report writing etcetera were initiated after the patient finished testing/raw data collected and was completed on the date the report was signed. This note will not be viewable in [address]

## 2021-02-15 ENCOUNTER — HOSPITAL ENCOUNTER (OUTPATIENT)
Dept: INFUSION THERAPY | Age: 71
Discharge: HOME | End: 2021-02-15

## 2021-02-16 ENCOUNTER — OFFICE VISIT (OUTPATIENT)
Dept: NEUROLOGY | Age: 71
End: 2021-02-16
Payer: MEDICARE

## 2021-02-16 DIAGNOSIS — R41.81 AGE-RELATED COGNITIVE DECLINE: Primary | ICD-10-CM

## 2021-02-16 PROCEDURE — 90832 PSYTX W PT 30 MINUTES: CPT | Performed by: PSYCHOLOGIST

## 2021-02-16 NOTE — PROGRESS NOTES
Mario Herman is a 65 y.o. female who presents today for feedback following recent neuropsychological testing. The results were reviewed of the recent neuropsychological evaluation, including discussing individual tests as well as the patient's areas of neurocognitive strength versus their weaknesses. Education was provided regarding my diagnostic impressions, and we discussed next steps for further evaluation down the road. I all also answered numerous questions related to the clinical findings, including discussing various methods to improve cognitive cognition and mood when relevant. The patient is encouraged to follow-up with the referring provider.     DIAGNOSTIC IMPRESSIONS:    ICD-10-CM ICD-9-CM    1. Age-related cognitive decline  R41.81 294.9        45163 30 minutes x 1    Akira Marshall, PHD

## 2021-02-18 ENCOUNTER — HOSPITAL ENCOUNTER (OUTPATIENT)
Dept: INFUSION THERAPY | Age: 71
Discharge: HOME | End: 2021-02-18

## 2021-03-19 ENCOUNTER — OFFICE VISIT (OUTPATIENT)
Dept: INTERNAL MEDICINE CLINIC | Age: 71
End: 2021-03-19
Payer: MEDICARE

## 2021-03-19 VITALS
SYSTOLIC BLOOD PRESSURE: 116 MMHG | HEIGHT: 65 IN | BODY MASS INDEX: 27.26 KG/M2 | RESPIRATION RATE: 14 BRPM | HEART RATE: 102 BPM | TEMPERATURE: 98.6 F | WEIGHT: 163.6 LBS | OXYGEN SATURATION: 99 % | DIASTOLIC BLOOD PRESSURE: 79 MMHG

## 2021-03-19 DIAGNOSIS — C85.90 NON-HODGKIN'S LYMPHOMA, UNSPECIFIED BODY REGION, UNSPECIFIED NON-HODGKIN LYMPHOMA TYPE (HCC): ICD-10-CM

## 2021-03-19 DIAGNOSIS — M06.9 RHEUMATOID ARTHRITIS INVOLVING SHOULDER, UNSPECIFIED LATERALITY, UNSPECIFIED WHETHER RHEUMATOID FACTOR PRESENT (HCC): ICD-10-CM

## 2021-03-19 DIAGNOSIS — Z12.11 SCREEN FOR COLON CANCER: ICD-10-CM

## 2021-03-19 DIAGNOSIS — Z94.0 KIDNEY TRANSPLANT RECIPIENT: ICD-10-CM

## 2021-03-19 DIAGNOSIS — G62.9 PERIPHERAL POLYNEUROPATHY: Primary | ICD-10-CM

## 2021-03-19 PROCEDURE — G8417 CALC BMI ABV UP PARAM F/U: HCPCS | Performed by: INTERNAL MEDICINE

## 2021-03-19 PROCEDURE — G8510 SCR DEP NEG, NO PLAN REQD: HCPCS | Performed by: INTERNAL MEDICINE

## 2021-03-19 PROCEDURE — 99214 OFFICE O/P EST MOD 30 MIN: CPT | Performed by: INTERNAL MEDICINE

## 2021-03-19 PROCEDURE — G8399 PT W/DXA RESULTS DOCUMENT: HCPCS | Performed by: INTERNAL MEDICINE

## 2021-03-19 PROCEDURE — G8427 DOCREV CUR MEDS BY ELIG CLIN: HCPCS | Performed by: INTERNAL MEDICINE

## 2021-03-19 PROCEDURE — 1090F PRES/ABSN URINE INCON ASSESS: CPT | Performed by: INTERNAL MEDICINE

## 2021-03-19 PROCEDURE — 3017F COLORECTAL CA SCREEN DOC REV: CPT | Performed by: INTERNAL MEDICINE

## 2021-03-19 PROCEDURE — G9899 SCRN MAM PERF RSLTS DOC: HCPCS | Performed by: INTERNAL MEDICINE

## 2021-03-19 PROCEDURE — 1101F PT FALLS ASSESS-DOCD LE1/YR: CPT | Performed by: INTERNAL MEDICINE

## 2021-03-19 PROCEDURE — G8536 NO DOC ELDER MAL SCRN: HCPCS | Performed by: INTERNAL MEDICINE

## 2021-03-19 NOTE — PROGRESS NOTES
Jose G Jaramillo is a 66 y.o. female who presents for evaluation of routine follow up. Last seen by me dec 17, 2020. Some concerns at that time about dementia, so she had testing down, of which results were normal, only age appropriate decline. She had been started on aricept prior to that visit, but would like to stop it now. Main concerns today are PN in both feet, as well as worsening arthritis pains from RA. I had referred her to rheumatology at last visit, but she apparently did not get the referral.  Follows up with renal every 6 months sp her kidney transplant implant. Daughter with her again today.       ROS:  Constitutional: negative for fevers, chills, anorexia and weight loss  Eyes:   negative for visual disturbance and irritation  ENT:   negative for tinnitus,sore throat,nasal congestion,ear pain,hoarseness  Respiratory:  negative for cough, hemoptysis, dyspnea,wheezing  CV:   negative for chest pain, palpitations, lower extremity edema  GI:   negative for nausea, vomiting, diarrhea, abdominal pain,melena  Genitourinary: negative for frequency, dysuria and hematuria  Musculoskel: negative for myalgias, arthralgias, back pain, muscle weakness, joint pain  Neurological:  negative for headaches, dizziness, focal weakness, numbness  Psychiatric:     Negative for depression or anxiety      Past Medical History:   Diagnosis Date    Cancer ([facility] [address]     Chronic kidney disease        Past Surgical History:   Procedure Laterality Date    HX GYN      hysterectomy     HX TRANSPLANT  04/03/1997    kidney       Family History   Problem Relation Age of Onset    Diabetes Mother        Social History     Socioeconomic History    Marital status:      Spouse name: Not on file    Number of children: Not on file    Years of education: Not on file    Highest education level: Not on file   Occupational History    Not on file   Social Needs    Financial resource strain: Not on file    Food insecurity Worry: Not on file     Inability: Not on file    Transportation needs     Medical: Not on file     Non-medical: Not on file   Tobacco Use    Smoking status: Never Smoker    Smokeless tobacco: Never Used   Substance and Sexual Activity    Alcohol use: Not Currently     Frequency: Never    Drug use: Never    Sexual activity: Not Currently   Lifestyle    Physical activity     Days per week: Not on file     Minutes per session: Not on file    Stress: Not on file   Relationships    Social connections     Talks on phone: Not on file     Gets together: Not on file     Attends [employer]al service: Not on file     Active member of club or organization: Not on file     Attends meetings of clubs or organizations: Not on file     Relationship status: Not on file    Intimate partner violence     Fear of current or ex partner: Not on file     Emotionally abused: Not on file     Physically abused: Not on file     Forced sexual activity: Not on file   Other Topics Concern    Not on file   Social History Narrative    Not on file            Visit Vitals  /79 (BP 1 Location: Left upper arm, BP Patient Position: Sitting)   Pulse (!) 102   Temp 98.6 °F (37 °C) (Temporal)   Resp 14   Ht 5' 5\" (1.651 m)   Wt 163 lb 9.6 oz (74.2 kg)   SpO2 99%   BMI 27.22 kg/m²       Physical Examination:   General - Well appearing female  HEENT - PERRL, TM no erythema/opacification, normal nasal turbinates, no oropharyngeal erythema or exudate, MMM  Neck - supple, no bruits, no thyroidomegaly, no lymphadenopathy  Pulm - clear to auscultation bilaterally  Cardio - RRR, normal S1 S2, no murmur  Abd - soft, nontender, no masses, no HSM  Extrem - no edema, +2 distal pulses  Neuro-  No focal deficits, CN intact     Assessment/Plan:    1. PN--check cbc, bmp, vit d, b12, tsh  2. RA--referral to rheum for treatment options, given her meds she needs for her kidney transplant  3.   Hx kidney transplant recipient--on bicarb, cyclosporine, prednisone  4. MCI--age appropriate per testing with neuropsychology. Ok to stop aricept  5. Screen colon cancer--fit cards given  6.   Insomnia--ambien helps    rtc 6 months        Gudelia Costa III, DO

## 2021-03-19 NOTE — PATIENT INSTRUCTIONS
Neuropathic Pain: Care Instructions  Your Care Instructions     Neuropathic pain is caused by pressure on or damage to your nerves. It's often simply called nerve pain. Some people feel this type of pain all the time. For others, it comes and goes. Diabetes, shingles, or an injury can cause nerve pain. Many people say the pain feels sharp, burning, or stabbing. But some people feel it as a dull ache. In some cases, it makes your skin very sensitive. So touch, pressure, and other sensations that did not hurt before may now cause pain. It's important to know that this kind of pain is real and can affect your quality of life. It's also important to know that treatment can help. Treatment includes pain medicines, exercise, and physical therapy. Medicines can help reduce the number of pain signals that travel over the nerves. This can make the painful areas less sensitive. It can also help you sleep better and improve your mood. But medicines are only one part of successful treatment. Most people do best with more than one kind of treatment. Your doctor may recommend that you try cognitive-behavioral therapy and stress management. Or, if needed, you may decide to try to quit smoking, lower your blood pressure, or better control blood sugar. These kinds of healthy changes can also make a difference. If you feel that your treatment is not working, talk to your doctor. And be sure to tell your doctor if you think you might be depressed or anxious. These are common problems that can also be treated. Follow-up care is a key part of your treatment and safety. Be sure to make and go to all appointments, and call your doctor if you are having problems. It's also a good idea to know your test results and keep a list of the medicines you take. How can you care for yourself at home? · Be safe with medicines. Read and follow all instructions on the label.   ? If the doctor gave you a prescription medicine for pain, take it as prescribed. ? If you are not taking a prescription pain medicine, ask your doctor if you can take an over-the-counter medicine. · Save hard tasks for days when you have less pain. Follow a hard task with an easy task. And remember to take breaks. · Relax, and reduce stress. You may want to try deep breathing or meditation. These can help. · Keep moving. Gentle, daily exercise can help reduce pain. Your doctor or physical therapist can tell you what type of exercise is best for you. This may include walking, swimming, and stationary biking. It may also include stretches and range-of-motion exercises. · Try heat, cold packs, and massage. · Get enough sleep. Constant pain can make you more tired. If the pain makes it hard to sleep, talk with your doctor. · Think positively. Your thoughts can affect your pain. Do fun things to distract yourself from the pain. See a movie, read a book, listen to music, or spend time with a friend. · Keep a pain diary. Try to write down how strong your pain is and what it feels like. Also try to notice and write down how your moods, thoughts, sleep, activities, and medicine affect your pain. These notes can help you and your doctor find the best ways to treat your pain. Reducing constipation caused by pain medicine  Pain medicines often cause constipation. To reduce constipation:  · Include fruits, vegetables, beans, and whole grains in your diet each day. These foods are high in fiber. · Drink plenty of fluids, enough so that your urine is light yellow or clear like water. If you have kidney, heart, or liver disease and have to limit fluids, talk with your doctor before you increase the amount of fluids you drink. · Get some exercise every day. Build up slowly to 30 to 60 minutes a day on 5 or more days of the week. · Take a fiber supplement, such as Citrucel or Metamucil, every day if needed. Read and follow all instructions on the label.   · Schedule time each day for a bowel movement. Having a daily routine may help. Take your time and do not strain when having a bowel movement. · Ask your doctor about a laxative. The goal is to have one easy bowel movement every 1 to 2 days. Do not let constipation go untreated for more than 3 days. When should you call for help? Call your doctor now or seek immediate medical care if:    · You feel sad, anxious, or hopeless for more than a few days. This could mean you are depressed. Depression is common in people who have a lot of pain. But it can be treated.     · You have trouble with bowel movements, such as:  ? No bowel movement in 3 days. ? Blood in the anal area, in your stool, or on the toilet paper. ? Diarrhea for more than 24 hours. Watch closely for changes in your health, and be sure to contact your doctor if:    · Your pain is getting worse.     · You can't sleep because of pain.     · You are very worried or anxious about your pain.     · You have trouble taking your pain medicine.     · You have any concerns about your pain medicine or its side effects.     · You have vomiting or cramps for more than 2 hours. Where can you learn more? Go to http://www.gray.com/  Enter W981 in the search box to learn more about \"Neuropathic Pain: Care Instructions. \"  Current as of: November 20, 2019               Content Version: 12.6  © 9847-7647 Healthwise, Incorporated. Care instructions adapted under license by [employer] (which disclaims liability or warranty for this information). If you have questions about a medical condition or this instruction, always ask your healthcare professional. [place] [address] any warranty or liability for your use of this information. Get labs done. Do NOT need to be fasting. Ok to stop aricept.

## 2021-04-01 LAB
25(OH)D3+25(OH)D2 SERPL-MCNC: 32.4 NG/ML (ref 30–100)
BASOPHILS # BLD AUTO: 0 X10E3/UL (ref 0–0.2)
BASOPHILS NFR BLD AUTO: 1 %
BUN SERPL-MCNC: 18 MG/DL (ref 8–27)
BUN/CREAT SERPL: 17 (ref 12–28)
CALCIUM SERPL-MCNC: 9.4 MG/DL (ref 8.7–10.3)
CHLORIDE SERPL-SCNC: 104 MMOL/L (ref 96–106)
CO2 SERPL-SCNC: 22 MMOL/L (ref 20–29)
CREAT SERPL-MCNC: 1.04 MG/DL (ref 0.57–1)
EOSINOPHIL # BLD AUTO: 0.1 X10E3/UL (ref 0–0.4)
EOSINOPHIL NFR BLD AUTO: 2 %
ERYTHROCYTE [DISTWIDTH] IN BLOOD BY AUTOMATED COUNT: 12.7 % (ref 11.7–15.4)
GLUCOSE SERPL-MCNC: 96 MG/DL (ref 65–99)
HCT VFR BLD AUTO: 40.6 % (ref 34–46.6)
HGB BLD-MCNC: 14 G/DL (ref 11.1–15.9)
IMM GRANULOCYTES # BLD AUTO: 0 X10E3/UL (ref 0–0.1)
IMM GRANULOCYTES NFR BLD AUTO: 0 %
LYMPHOCYTES # BLD AUTO: 2 X10E3/UL (ref 0.7–3.1)
LYMPHOCYTES NFR BLD AUTO: 32 %
MCH RBC QN AUTO: 35.8 PG (ref 26.6–33)
MCHC RBC AUTO-ENTMCNC: 34.5 G/DL (ref 31.5–35.7)
MCV RBC AUTO: 104 FL (ref 79–97)
MONOCYTES # BLD AUTO: 0.5 X10E3/UL (ref 0.1–0.9)
MONOCYTES NFR BLD AUTO: 7 %
MORPHOLOGY BLD-IMP: ABNORMAL
NEUTROPHILS # BLD AUTO: 3.7 X10E3/UL (ref 1.4–7)
NEUTROPHILS NFR BLD AUTO: 58 %
PLATELET # BLD AUTO: 96 X10E3/UL (ref 150–450)
POTASSIUM SERPL-SCNC: 4.7 MMOL/L (ref 3.5–5.2)
RBC # BLD AUTO: 3.91 X10E6/UL (ref 3.77–5.28)
SODIUM SERPL-SCNC: 138 MMOL/L (ref 134–144)
TSH SERPL DL<=0.005 MIU/L-ACNC: 1.63 UIU/ML (ref 0.45–4.5)
VIT B12 SERPL-MCNC: 240 PG/ML (ref 232–1245)
WBC # BLD AUTO: 6.3 X10E3/UL (ref 3.4–10.8)

## 2021-04-01 NOTE — PROGRESS NOTES
Platelet count dropped considerably. She should see hematology, dr Sloan Ahn or his new partner. Other labs look ok.

## 2021-04-02 ENCOUNTER — PATIENT MESSAGE (OUTPATIENT)
Dept: INTERNAL MEDICINE CLINIC | Age: 71
End: 2021-04-02

## 2021-04-05 ENCOUNTER — TELEPHONE (OUTPATIENT)
Dept: INTERNAL MEDICINE CLINIC | Age: 71
End: 2021-04-05

## 2021-04-05 NOTE — TELEPHONE ENCOUNTER
----- Message from Cristel Monroy sent at 4/2/2021  5:01 PM EDT -----  Regarding: Dr. Wells Bad: [phone]  General Message/Vendor Calls    Caller's first and last name: Geraldo Ibarra - Daughter      Reason for call: Returning missed call from nurse      Callback required yes/no and why: yes/follow up      Best contact number(s): [phone]      Details to clarify the request: N/A      Lawrence Swift

## 2021-04-05 NOTE — TELEPHONE ENCOUNTER
Called daughter (silke) back one more time trying to relay the results from the lab work. [email or web address] message has not been checked and letter has been mailed with referral information for Dr. Maryse Han.

## 2021-04-05 NOTE — PROGRESS NOTES
Tried to call patient, lvm and sent message via [employer]. Returned daughters call, lvm and will try to call back.   Daughter is on [facility]

## 2021-04-05 NOTE — TELEPHONE ENCOUNTER
Called daughter Sujata Swanson) back, lvm, also advised I sent message on [employer] for same reason of phone call. Advised I will cont to call back to speak with her for her mother.

## 2021-04-08 DIAGNOSIS — G47.00 INSOMNIA, UNSPECIFIED TYPE: ICD-10-CM

## 2021-04-08 LAB — HEMOCCULT STL QL IA: NEGATIVE

## 2021-04-08 NOTE — TELEPHONE ENCOUNTER
----- Message from Keith Hammond sent at 4/8/2021  4:09 PM EDT -----  Regarding: Dr. Asha Eric (if not patient): Christo Carlos       Relationship of caller (if not patient): daughter      Best contact number(s): [phone]      Name of medication and dosage if known: zolpidem (AMBIEN) 5 mg tablet       Is patient out of this medication (yes/no): yes       [facility] [address] [facility] [address] [id number] listed in chart? (yes/no): yes   Pharmacy phone number:  [facility]:   [phone]; [phone]      Message from [facility]

## 2021-04-09 RX ORDER — ZOLPIDEM TARTRATE 5 MG/1
TABLET ORAL
Qty: 90 TAB | Refills: 3 | Status: SHIPPED | OUTPATIENT
Start: 2021-04-09 | End: 2021-11-02 | Stop reason: SDUPTHER

## 2021-04-09 NOTE — TELEPHONE ENCOUNTER
Future Appointments:  Future Appointments   Date Time Provider Yoseph Carter   5/14/2021  2:40 PM Willy Douglas MD [facility] BS AMB   7/22/2021  2:00 PM Dwain Perez [facility] BS AMB        Last Appointment With Me:  3/19/2021     Requested Prescriptions     Pending Prescriptions Disp Refills    zolpidem (AMBIEN) 5 mg tablet 90 Tab 0     Sig: TAKE 1 TABLET BY MOUTH EVERY DAY NIGHTLY FOR SLEEP

## 2021-04-26 DIAGNOSIS — G47.00 INSOMNIA, UNSPECIFIED TYPE: ICD-10-CM

## 2021-04-26 RX ORDER — ZOLPIDEM TARTRATE 5 MG/1
TABLET ORAL
Qty: 90 TAB | Refills: 3 | Status: CANCELLED | OUTPATIENT
Start: 2021-04-26

## 2021-05-07 DIAGNOSIS — G47.00 INSOMNIA, UNSPECIFIED TYPE: ICD-10-CM

## 2021-05-07 RX ORDER — ZOLPIDEM TARTRATE 5 MG/1
TABLET ORAL
Qty: 90 TAB | Refills: 3 | Status: CANCELLED | OUTPATIENT
Start: 2021-05-07

## 2021-05-07 NOTE — TELEPHONE ENCOUNTER
Medication Refill     Caller (if not patient): Edmund Palacio       Relationship of caller (if not patient): daughter       Best contact number(s): [phone]       Name of medication and dosage if known: Zolpidem 5 mg       Is patient out of this medication (yes/no): yes       Pharmacy name: [facility]     Pharmacy listed in chart? (yes/no): yes   Pharmacy phone number: [phone]         Details to clarify the request: N/A       Message from [facility]

## 2021-06-27 RX ORDER — SODIUM BICARBONATE 650 MG/1
TABLET ORAL
Qty: 360 TABLET | Refills: 3 | Status: SHIPPED | OUTPATIENT
Start: 2021-06-27 | End: 2022-07-04

## 2021-07-22 ENCOUNTER — OFFICE VISIT (OUTPATIENT)
Dept: INTERNAL MEDICINE CLINIC | Age: 71
End: 2021-07-22
Payer: MEDICARE

## 2021-07-22 VITALS
OXYGEN SATURATION: 98 % | HEART RATE: 90 BPM | RESPIRATION RATE: 14 BRPM | SYSTOLIC BLOOD PRESSURE: 124 MMHG | HEIGHT: 65 IN | BODY MASS INDEX: 26.76 KG/M2 | WEIGHT: 160.6 LBS | DIASTOLIC BLOOD PRESSURE: 84 MMHG

## 2021-07-22 DIAGNOSIS — M81.0 POSTMENOPAUSAL BONE LOSS: ICD-10-CM

## 2021-07-22 DIAGNOSIS — C85.91 NON-HODGKIN LYMPHOMA OF LYMPH NODES OF NECK, UNSPECIFIED NON-HODGKIN LYMPHOMA TYPE (HCC): ICD-10-CM

## 2021-07-22 DIAGNOSIS — M06.9 RHEUMATOID ARTHRITIS INVOLVING SHOULDER, UNSPECIFIED LATERALITY, UNSPECIFIED WHETHER RHEUMATOID FACTOR PRESENT (HCC): ICD-10-CM

## 2021-07-22 DIAGNOSIS — Z94.0 KIDNEY TRANSPLANT RECIPIENT: ICD-10-CM

## 2021-07-22 DIAGNOSIS — Z00.00 MEDICARE ANNUAL WELLNESS VISIT, SUBSEQUENT: Primary | ICD-10-CM

## 2021-07-22 DIAGNOSIS — E78.2 MIXED HYPERLIPIDEMIA: ICD-10-CM

## 2021-07-22 DIAGNOSIS — G47.09 OTHER INSOMNIA: ICD-10-CM

## 2021-07-22 DIAGNOSIS — G62.9 PERIPHERAL POLYNEUROPATHY: ICD-10-CM

## 2021-07-22 PROCEDURE — 1090F PRES/ABSN URINE INCON ASSESS: CPT | Performed by: INTERNAL MEDICINE

## 2021-07-22 PROCEDURE — G8536 NO DOC ELDER MAL SCRN: HCPCS | Performed by: INTERNAL MEDICINE

## 2021-07-22 PROCEDURE — 1101F PT FALLS ASSESS-DOCD LE1/YR: CPT | Performed by: INTERNAL MEDICINE

## 2021-07-22 PROCEDURE — 99213 OFFICE O/P EST LOW 20 MIN: CPT | Performed by: INTERNAL MEDICINE

## 2021-07-22 PROCEDURE — G9899 SCRN MAM PERF RSLTS DOC: HCPCS | Performed by: INTERNAL MEDICINE

## 2021-07-22 PROCEDURE — G0439 PPPS, SUBSEQ VISIT: HCPCS | Performed by: INTERNAL MEDICINE

## 2021-07-22 PROCEDURE — 3017F COLORECTAL CA SCREEN DOC REV: CPT | Performed by: INTERNAL MEDICINE

## 2021-07-22 PROCEDURE — G8510 SCR DEP NEG, NO PLAN REQD: HCPCS | Performed by: INTERNAL MEDICINE

## 2021-07-22 PROCEDURE — G8417 CALC BMI ABV UP PARAM F/U: HCPCS | Performed by: INTERNAL MEDICINE

## 2021-07-22 PROCEDURE — G8427 DOCREV CUR MEDS BY ELIG CLIN: HCPCS | Performed by: INTERNAL MEDICINE

## 2021-07-22 RX ORDER — TETANUS TOXOID, REDUCED DIPHTHERIA TOXOID AND ACELLULAR PERTUSSIS VACCINE, ADSORBED 5; 2.5; 8; 8; 2.5 [IU]/.5ML; [IU]/.5ML; UG/.5ML; UG/.5ML; UG/.5ML
0.5 SUSPENSION INTRAMUSCULAR ONCE
Qty: 0.5 ML | Refills: 0 | Status: SHIPPED | OUTPATIENT
Start: 2021-07-22 | End: 2021-07-22

## 2021-07-22 RX ORDER — TRAZODONE HYDROCHLORIDE 50 MG/1
50 TABLET ORAL
Qty: 90 TABLET | Refills: 3 | Status: SHIPPED | OUTPATIENT
Start: 2021-07-22 | End: 2022-02-03

## 2021-07-22 NOTE — PROGRESS NOTES
Shayne Lowe is a 66 y.o. female who presents for evaluation of awv. Last seen by me march 19, 2021. Overall done well since then, though does not sleep well. Not very active during the day either. Daughter with her today.       ROS:  Constitutional: negative for fevers, chills, anorexia and weight loss  Eyes:   negative for visual disturbance and irritation  ENT:   negative for tinnitus,sore throat,nasal congestion,ear pain,hoarseness  Respiratory:  negative for cough, hemoptysis, dyspnea,wheezing  CV:   negative for chest pain, palpitations, lower extremity edema  GI:   negative for nausea, vomiting, diarrhea, abdominal pain,melena  Genitourinary: negative for frequency, dysuria and hematuria  Musculoskel: negative for myalgias, arthralgias, back pain, muscle weakness, joint pain  Neurological:  negative for headaches, dizziness, focal weakness, numbness  Psychiatric:     Negative for depression or anxiety      Past Medical History:   Diagnosis Date    Cancer ([facility] [address]     Chronic kidney disease        Past Surgical History:   Procedure Laterality Date    HX GYN      hysterectomy     HX TRANSPLANT  04/03/1997    kidney       Family History   Problem Relation Age of Onset    Diabetes Mother        Social History     Socioeconomic History    Marital status:      Spouse name: Not on file    Number of children: Not on file    Years of education: Not on file    Highest education level: Not on file   Occupational History    Not on file   Tobacco Use    Smoking status: Never Smoker    Smokeless tobacco: Never Used   Substance and Sexual Activity    Alcohol use: Not Currently    Drug use: Never    Sexual activity: Not Currently   Other Topics Concern    Not on file   Social History Narrative    Not on file     Social Determinants of Health     Financial Resource Strain:     Difficulty of Paying Living Expenses:    Food Insecurity:     Worried About Running Out of Food in the Last Year:  Ran Out of Food in the Last Year:    Transportation Needs:     Lack of Transportation (Medical):  Lack of Transportation (Non-Medical):    Physical Activity:     Days of Exercise per Week:     Minutes of Exercise per Session:    Stress:     Feeling of Stress :    Social Connections:     Frequency of Communication with Friends and Family:     Frequency of Social Gatherings with Friends and Family:     Attends Nondenominational Services:     Active Member of Clubs or Organizations:     Attends Club or Organization Meetings:     Marital Status:    Intimate Partner Violence:     Fear of Current or Ex-Partner:     Emotionally Abused:     Physically Abused:     Sexually Abused:             Visit Vitals  /84 (BP 1 Location: Left upper arm, BP Patient Position: Sitting)   Pulse 90   Resp 14   Ht 5' 5\" (1.651 m)   Wt 160 lb 9.6 oz (72.8 kg)   SpO2 98%   BMI 26.73 kg/m²       Physical Examination:   General - Well appearing female  HEENT - PERRL, TM no erythema/opacification, normal nasal turbinates, no oropharyngeal erythema or exudate, MMM  Neck - supple, no bruits, no thyroidomegaly, no lymphadenopathy  Pulm - clear to auscultation bilaterally  Cardio - RRR, normal S1 S2, no murmur  Abd - soft, nontender, no masses, no HSM  Extrem - no edema, +2 distal pulses  Neuro-  No focal deficits, CN intact     Assessment/Plan:    1. Insomnia--stop ambien. Try trazodone. Encouraged 30 minutes of walking every night after dinner. 2.  Hx kidney transplant recipient--check cbc, cmp. On prednisone, cyclosporine, bicarb  3. MCI--stable  4. RA--on prednisone  5. Post menopausal bone loss--check dexa scan.   On ca/vit d    rx given for tdap.  shingrix cost prohibitive  rtc 6 months        Cali Alberto III, DO            This is the Subsequent Medicare Annual Wellness Exam, performed 12 months or more after the Initial AWV or the last Subsequent AWV    I have reviewed the patient's medical history in detail and updated the computerized patient record. Assessment/Plan   Education and counseling provided:  Are appropriate based on today's review and evaluation  End-of-Life planning (with patient's consent)  Pneumococcal Vaccine  Influenza Vaccine  Bone mass measurement (DEXA)    1. Medicare annual wellness visit, subsequent       Depression Risk Factor Screening     3 most recent PHQ Screens 7/22/2021   Little interest or pleasure in doing things Not at all   Feeling down, depressed, irritable, or hopeless Not at all   Total Score PHQ 2 0       Alcohol Risk Screen    Do you average more than 1 drink per night or more than 7 drinks a week:  No.  Never any etoh. On any one occasion in the past three months have you have had more than 3 drinks containing alcohol:  No        Functional Ability and Level of Safety    Hearing: Hearing is good. Activities of Daily Living: The home contains: no safety equipment. Patient does total self care      Ambulation: with no difficulty     Fall Risk:  Fall Risk Assessment, last 12 mths 7/22/2021   Able to walk? Yes   Fall in past 12 months? 1   Do you feel unsteady? 0   Are you worried about falling 0   Number of falls in past 12 months 1   Fall with injury? 0      Abuse Screen:  Patient is not abused. Lives with daughter. Cognitive Screening    Has your family/caregiver stated any concerns about your memory: no.  Had eval by neuropsychology 2020, normal ageing brain.      Cognitive Screening: Normal - MMSE (Mini Mental Status Exam)    Health Maintenance Due     Health Maintenance Due   Topic Date Due    DTaP/Tdap/Td series (1 - Tdap) Never done    Shingrix Vaccine Age 50> (1 of 2) Never done       Patient Care Team   Patient Care Team:  Moris Espitia DO as PCP - General (Internal Medicine)  Moris Espitia DO as PCP - [facility] Empaneled Provider  Shari Oneill MD (Colon and Rectal Surgery)  Lyudmila Washington MD (Nephrology)  Tiffanie Escobedo, Teena Spears MD (Nephrology)    History     Patient Active Problem List   Diagnosis Code    Obesity (BMI 30-39. 9) E66.9    Other insomnia G47.09    Rheumatoid arthritis (HCC) M06.9    NHL (non-Hodgkin's lymphoma) (HCC) C85.90    Kidney transplant recipient Z80.0     Past Medical History:   Diagnosis Date    Cancer ([facility] [address]     Chronic kidney disease       Past Surgical History:   Procedure Laterality Date    HX GYN      hysterectomy     HX TRANSPLANT  04/03/1997    kidney     Current Outpatient Medications   Medication Sig Dispense Refill    sodium bicarbonate 650 mg tablet TAKE 1 TABLET BY MOUTH FOUR TIMES A  Tablet 3    zolpidem (AMBIEN) 5 mg tablet TAKE 1 TABLET BY MOUTH EVERY DAY NIGHTLY FOR SLEEP 90 Tab 3    predniSONE (DELTASONE) 5 mg tablet TAKE 1 TABLET BY MOUTH ONCE DAILY 90 Tab 3    cycloSPORINE (SandIMMUNE) 25 mg capsule Take 3 Caps by mouth two (2) times a day. Dx code: Z94.0 540 Cap 3    calcium-cholecalciferol, d3, (CALCIUM 600 + D) 600-125 mg-unit tab Take 600 mg by mouth two (2) times a day.        Allergies   Allergen Reactions    Gabapentin Vertigo     syncope       Family History   Problem Relation Age of Onset    Diabetes Mother      Social History     Tobacco Use    Smoking status: Never Smoker    Smokeless tobacco: Never Used   Substance Use Topics    Alcohol use: Not Currently         Earma Grates III, DO

## 2021-07-22 NOTE — PATIENT INSTRUCTIONS

## 2021-07-27 ENCOUNTER — LAB ONLY (OUTPATIENT)
Dept: INTERNAL MEDICINE CLINIC | Age: 71
End: 2021-07-27

## 2021-07-27 DIAGNOSIS — E78.2 MIXED HYPERLIPIDEMIA: ICD-10-CM

## 2021-07-27 DIAGNOSIS — M06.9 RHEUMATOID ARTHRITIS INVOLVING SHOULDER, UNSPECIFIED LATERALITY, UNSPECIFIED WHETHER RHEUMATOID FACTOR PRESENT (HCC): ICD-10-CM

## 2021-07-27 LAB
ALBUMIN SERPL-MCNC: 3.7 G/DL (ref 3.5–5)
ALBUMIN/GLOB SERPL: 1.4 {RATIO} (ref 1.1–2.2)
ALP SERPL-CCNC: 82 U/L (ref 45–117)
ALT SERPL-CCNC: 27 U/L (ref 12–78)
ANION GAP SERPL CALC-SCNC: 6 MMOL/L (ref 5–15)
AST SERPL-CCNC: 24 U/L (ref 15–37)
BILIRUB SERPL-MCNC: 0.7 MG/DL (ref 0.2–1)
BUN SERPL-MCNC: 15 MG/DL (ref 6–20)
BUN/CREAT SERPL: 18 (ref 12–20)
CALCIUM SERPL-MCNC: 9.2 MG/DL (ref 8.5–10.1)
CHLORIDE SERPL-SCNC: 110 MMOL/L (ref 97–108)
CHOLEST SERPL-MCNC: 153 MG/DL
CO2 SERPL-SCNC: 22 MMOL/L (ref 21–32)
CREAT SERPL-MCNC: 0.84 MG/DL (ref 0.55–1.02)
GLOBULIN SER CALC-MCNC: 2.6 G/DL (ref 2–4)
GLUCOSE SERPL-MCNC: 105 MG/DL (ref 65–100)
HDLC SERPL-MCNC: 54 MG/DL
HDLC SERPL: 2.8 {RATIO} (ref 0–5)
LDLC SERPL CALC-MCNC: 76.2 MG/DL (ref 0–100)
POTASSIUM SERPL-SCNC: 4.5 MMOL/L (ref 3.5–5.1)
PROT SERPL-MCNC: 6.3 G/DL (ref 6.4–8.2)
SODIUM SERPL-SCNC: 138 MMOL/L (ref 136–145)
TRIGL SERPL-MCNC: 114 MG/DL (ref ?–150)
VLDLC SERPL CALC-MCNC: 22.8 MG/DL

## 2021-08-16 ENCOUNTER — APPOINTMENT (OUTPATIENT)
Dept: INFUSION THERAPY | Age: 71
End: 2021-08-16

## 2021-08-19 NOTE — TELEPHONE ENCOUNTER
#[phone]  Pt states the [facility] medication [facility]. Please call so she can go . Thanks.
Spoke to pharmacist at [facility] to give clarification on days supply-gave verbal for 7 day supply (order written for 10 days).
room air

## 2021-08-23 DIAGNOSIS — G47.09 OTHER INSOMNIA: Primary | ICD-10-CM

## 2021-08-23 NOTE — TELEPHONE ENCOUNTER
[phone]  Skip Miners  Verified on hipaa dated 1/13/2021    Regarding:  traZODone (DESYREL) 50 mg tablet      States pt is taking 100 mg trazodone at bedtime and states it not working. States pt is getting no full hours of sleep.

## 2021-08-24 NOTE — TELEPHONE ENCOUNTER
----- Message from Josué Bright sent at 8/24/2021  3:43 PM EDT -----  Regarding: Dr. Susan Prakash Message/Vendor Calls    Caller's first and last name: Felton Oviedonight       Reason for call: Requesting to have an update on mother's sleep medication not working. Callback required yes/no and why: Yes, to confirm update on mother's sleep medication. Best contact number(s): [phone]      Details to clarify the request: Patient third party verification completed. Second attempt.        Message from [facility]

## 2021-08-25 NOTE — TELEPHONE ENCOUNTER
Patient's Daughter, Sky Nesbitt, states she needs a call back today in reference to message left on Monday, 8/23/21 in reference to patient's Sleep Medication No Longer seems to be working & needs a call back today to be advised what can be done. Please call.  Thank you

## 2021-08-25 NOTE — TELEPHONE ENCOUNTER
Patient's daughter recently called stating that patient's Trazodone 50 mg has not been effective for patient. Dosage was increased on 7/22 to 100 mg at  this time. Daughter calling today stating that Trazodone 100 mg has not been helping patient at this time as well. Daughter would like to know what else can be done at this time.

## 2021-08-26 NOTE — TELEPHONE ENCOUNTER
----- Message from Carlos Lin sent at 8/26/2021 11:33 AM EDT -----  Regarding: Dr. Silvia Matthews (if not patient): NA       Relationship of caller (if not patient): Self       Best contact number(s): [phone]      Name of medication and dosage if known:  Belsomra 10 mg       Is patient out of this medication (yes/no): Yes      [facility]    Pharmacy listed in chart? (yes/no): Yes  Pharmacy phone number: NA      Details to clarify the request: Patient advising she has indeed tried Melatonin and no effect as well, requesting Rx for Belsomra 10 mg be sent to [facility] for her. Please call patient back to confirm this has been done as she didn't get prior calls.        Message from [facility]

## 2021-08-26 NOTE — TELEPHONE ENCOUNTER
Daughter states that patient has tried melatonin previously and it has not worked.  Daughter would like for patient to try Belsomra 10 mg at this time

## 2021-08-26 NOTE — TELEPHONE ENCOUNTER
I have attempted without success to contact this patient by phone. Unable to reach patient at this time. Left generic message to return call to office at earliest convenience. Awaiting call back at this time.

## 2021-09-02 ENCOUNTER — TELEPHONE (OUTPATIENT)
Dept: INTERNAL MEDICINE CLINIC | Age: 71
End: 2021-09-02

## 2021-09-02 DIAGNOSIS — G47.09 OTHER INSOMNIA: Primary | ICD-10-CM

## 2021-09-02 NOTE — TELEPHONE ENCOUNTER
Patient needs to be called in reference to Sleep Medication is not Working & needs to discuss Medication alternatives or get an appt to be seen. Please call.  Thank you

## 2021-09-07 NOTE — TELEPHONE ENCOUNTER
Trupti Paredes, DO  You 2 hours ago (12:55 PM)   SC  I don't  have any other ideas for sleep agents--have used up all of my options.  Can refer her to sleep team, see if they can help.  Dr braxotn.     Message text

## 2021-09-08 NOTE — TELEPHONE ENCOUNTER
Called, spoke with Pt. Two pt identifiers confirmed. Pt informed per Dr. Jessica Lopez she will need see a sleep doctor. Pt given information for Dr. Bedelia Carrel. Pt verbalized understanding of information discussed w/ no further questions at this time.

## 2021-09-13 RX ORDER — ONDANSETRON 4 MG/1
4 TABLET, ORALLY DISINTEGRATING ORAL
Qty: 30 TABLET | Refills: 0 | Status: SHIPPED | OUTPATIENT
Start: 2021-09-13

## 2021-09-13 NOTE — TELEPHONE ENCOUNTER
----- Message from Venecia Pinto sent at 9/13/2021  8:46 AM EDT -----  Regarding: Md Henriquez/Telephone  Medication Refill    Caller (if not patient): NA       Relationship of caller (if not patient): Self       Best contact number(s): [phone]        Name of medication and dosage if known: Zofran 4 mg. Tablets       Is patient out of this medication (yes/no):  No       Pharmacy name: [facility]     Pharmacy listed in chart? (yes/no): Yes   Pharmacy phone number: NA       Message from [facility]

## 2021-09-13 NOTE — TELEPHONE ENCOUNTER
Called patient. ID verified with Name and . Spoke with patient in regards to medication refill request. Informed patient that Zofran was not listed in patient's current or discontinued medications. Patient states that she was seen in [facility] ED on  (No information in chart from visit) for dizzy spells, nausea, and vomiting. Patient states that she was prescribed zofran at that time and would like a PRN order for medication.

## 2021-10-28 ENCOUNTER — TRANSCRIBE ORDER (OUTPATIENT)
Dept: SCHEDULING | Age: 71
End: 2021-10-28

## 2021-10-28 DIAGNOSIS — Z12.31 SCREENING MAMMOGRAM FOR HIGH-RISK PATIENT: Primary | ICD-10-CM

## 2021-11-02 DIAGNOSIS — G47.00 INSOMNIA, UNSPECIFIED TYPE: ICD-10-CM

## 2021-11-02 RX ORDER — ZOLPIDEM TARTRATE 5 MG/1
TABLET ORAL
Qty: 90 TABLET | Refills: 3 | Status: SHIPPED | OUTPATIENT
Start: 2021-11-02 | End: 2021-11-16 | Stop reason: SDUPTHER

## 2021-11-02 NOTE — TELEPHONE ENCOUNTER
----- Message from Elkin Cardenas sent at 11/2/2021  9:17 AM EDT -----  Subject: Medication Problem    QUESTIONS  Name of Medication? suvorexant (BELSOMRA) 10 mg tablet  Patient-reported dosage and instructions? 1 daily  What question or problem do you have with the medication? Pt said this   medication is not working and would like to be put back on [facility]? [id number] - [place], [address] AT [address] [place] [address] phone number (if available)? [phone]  Additional Information for Provider?   ---------------------------------------------------------------------------  --------------  [address]  What is the best way for the office to contact you? OK to leave message on   voicemail  Preferred Call Back Phone Number? [phone]  ---------------------------------------------------------------------------  --------------  SCRIPT ANSWERS  Relationship to Patient?  Self

## 2021-11-02 NOTE — TELEPHONE ENCOUNTER
Patient is requesting to switch Belsomra back to Ambien. Patient states that [facility] is not working.

## 2021-11-16 DIAGNOSIS — G47.00 INSOMNIA, UNSPECIFIED TYPE: ICD-10-CM

## 2021-11-16 RX ORDER — ZOLPIDEM TARTRATE 5 MG/1
TABLET ORAL
Qty: 90 TABLET | Refills: 3 | Status: SHIPPED | OUTPATIENT
Start: 2021-11-16 | End: 2021-11-22 | Stop reason: SDUPTHER

## 2021-11-16 NOTE — TELEPHONE ENCOUNTER
Pt states [facility] never received the script for Ambien. Can this please be called into mail order again today please? Thanks.

## 2021-11-22 DIAGNOSIS — G47.00 INSOMNIA, UNSPECIFIED TYPE: ICD-10-CM

## 2021-11-22 RX ORDER — ZOLPIDEM TARTRATE 5 MG/1
TABLET ORAL
Qty: 90 TABLET | Refills: 3 | Status: SHIPPED | OUTPATIENT
Start: 2021-11-22 | End: 2022-01-03 | Stop reason: SDUPTHER

## 2021-11-22 NOTE — TELEPHONE ENCOUNTER
[facility] states that they show pt received medication on Saturday, 11-20-21. Pt has told [facility] that she has not gotten that. The pharm states they will need a new script sent over today so pt can get her medication as soon as possible. There is an investigation going on in this matter. You may call in #[phone]  Ref #[id number]    Thanks.

## 2021-11-22 NOTE — TELEPHONE ENCOUNTER
Future Appointments:  Future Appointments   Date Time Provider Yoseph Carter   12/20/2021  1:00 PM [id number] JESSICA 1 [facility]   12/20/2021  2:00 PM [id number] DEXA 1 [facility]   1/24/2022 10:00 AM Santosh Kilgore DO [facility] BS AMB        Last Appointment With Me:  7/22/2021     Requested Prescriptions     Pending Prescriptions Disp Refills    zolpidem (AMBIEN) 5 mg tablet 90 Tablet 3     Sig: TAKE 1 TABLET BY MOUTH EVERY DAY NIGHTLY FOR SLEEP

## 2021-12-20 ENCOUNTER — HOSPITAL ENCOUNTER (OUTPATIENT)
Dept: MAMMOGRAPHY | Age: 71
Discharge: HOME | End: 2021-12-20
Attending: INTERNAL MEDICINE
Payer: MEDICARE

## 2021-12-20 ENCOUNTER — HOSPITAL ENCOUNTER (OUTPATIENT)
Dept: MAMMOGRAPHY | Age: 71
End: 2021-12-20
Attending: INTERNAL MEDICINE
Payer: MEDICARE

## 2021-12-20 DIAGNOSIS — Z12.31 SCREENING MAMMOGRAM FOR HIGH-RISK PATIENT: ICD-10-CM

## 2021-12-20 PROCEDURE — 77067 SCR MAMMO BI INCL CAD: CPT

## 2022-01-03 DIAGNOSIS — G47.00 INSOMNIA, UNSPECIFIED TYPE: ICD-10-CM

## 2022-01-03 NOTE — TELEPHONE ENCOUNTER
Patient's Daughter, Keith Jara states that Patient needs a New Prescription as Patient has a [employer] 1/1/2022 & New Mail Order Pharmacy is [facility]. Please call if any questions or to advise when done.  Thank you

## 2022-01-04 RX ORDER — ZOLPIDEM TARTRATE 5 MG/1
TABLET ORAL
Qty: 90 TABLET | Refills: 3 | Status: SHIPPED | OUTPATIENT
Start: 2022-01-04 | End: 2022-03-15

## 2022-01-07 ENCOUNTER — APPOINTMENT (OUTPATIENT)
Dept: GENERAL RADIOLOGY | Age: 72
End: 2022-01-07
Attending: EMERGENCY MEDICINE
Payer: MEDICARE

## 2022-01-07 ENCOUNTER — HOSPITAL ENCOUNTER (EMERGENCY)
Age: 72
Discharge: HOME | End: 2022-01-07
Attending: EMERGENCY MEDICINE
Payer: MEDICARE

## 2022-01-07 VITALS
RESPIRATION RATE: 20 BRPM | OXYGEN SATURATION: 98 % | HEIGHT: 65 IN | SYSTOLIC BLOOD PRESSURE: 111 MMHG | HEART RATE: 89 BPM | WEIGHT: 150 LBS | DIASTOLIC BLOOD PRESSURE: 80 MMHG | BODY MASS INDEX: 24.99 KG/M2 | TEMPERATURE: 97.8 F

## 2022-01-07 DIAGNOSIS — J18.9 ATYPICAL PNEUMONIA: Primary | ICD-10-CM

## 2022-01-07 DIAGNOSIS — B34.9 VIRAL ILLNESS: ICD-10-CM

## 2022-01-07 LAB
ATRIAL RATE: 89 BPM
CALCULATED P AXIS, ECG09: 40 DEGREES
CALCULATED R AXIS, ECG10: -31 DEGREES
CALCULATED T AXIS, ECG11: 20 DEGREES
DIAGNOSIS, 93000: NORMAL
P-R INTERVAL, ECG05: 158 MS
Q-T INTERVAL, ECG07: 320 MS
QRS DURATION, ECG06: 68 MS
QTC CALCULATION (BEZET), ECG08: 389 MS
VENTRICULAR RATE, ECG03: 89 BPM

## 2022-01-07 PROCEDURE — 99284 EMERGENCY DEPT VISIT MOD MDM: CPT

## 2022-01-07 PROCEDURE — U0005 INFEC AGEN DETEC AMPLI PROBE: HCPCS

## 2022-01-07 PROCEDURE — 71045 X-RAY EXAM CHEST 1 VIEW: CPT

## 2022-01-07 PROCEDURE — 93005 ELECTROCARDIOGRAM TRACING: CPT

## 2022-01-07 RX ORDER — AZITHROMYCIN 250 MG/1
TABLET, FILM COATED ORAL
Qty: 6 TABLET | Refills: 0 | Status: SHIPPED | OUTPATIENT
Start: 2022-01-07 | End: 2022-02-03 | Stop reason: ALTCHOICE

## 2022-01-07 RX ORDER — PREDNISONE 5 MG/1
TABLET ORAL
Qty: 21 TABLET | Refills: 0 | Status: SHIPPED | OUTPATIENT
Start: 2022-01-07 | End: 2022-02-03 | Stop reason: ALTCHOICE

## 2022-01-07 NOTE — ED PROVIDER NOTES
EMERGENCY DEPARTMENT HISTORY AND PHYSICAL EXAM      Date: 1/7/2022  Patient Name: Eunice Loredo    History of Presenting Illness     Chief Complaint   Patient presents with    Concern For COVID-19 (Coronavirus)     Pt arrives in wheelchair to triage with CC of generalized body aches, cough and CP x 1 week. Patient reprots he daughter was diagnosed with COVID on Monday. Patient reports she is fully vaccinated for COVID and Flu. History Provided By: Patient    HPI: Eunice Loredo, 66 y.o. female  presents to the ED with cc of body aches. Patient states she has had symptoms for 4 to 5 days. She lives with her daughter who was recently diagnosed with COVID-19. She states she has had headaches and body aches in her chest back and extremities. She has had a cough. No history of any pre-existing lung disease. She is vaccinated for COVID-19. Past History     Past Medical History:  Past Medical History:   Diagnosis Date    Cancer ([facility] [address]     Chronic kidney disease        Past Surgical History:  Past Surgical History:   Procedure Laterality Date    HX GYN      hysterectomy     HX TRANSPLANT  04/03/1997    kidney       Medications:  No current facility-administered medications on file prior to encounter. Current Outpatient Medications on File Prior to Encounter   Medication Sig Dispense Refill    ondansetron (ZOFRAN ODT) 4 mg disintegrating tablet Take 1 Tablet by mouth every eight (8) hours as needed for Nausea or Vomiting. 30 Tablet 0    zolpidem (AMBIEN) 5 mg tablet TAKE 1 TABLET BY MOUTH EVERY DAY NIGHTLY FOR SLEEP 90 Tablet 3    predniSONE (DELTASONE) 5 mg tablet TAKE 1 TABLET BY MOUTH ONCE DAILY 90 Tablet 3    cycloSPORINE (SandIMMUNE) 25 mg capsule Take 3 Capsules by mouth two (2) times a day. Dx code: Z94.0 540 Capsule 3    suvorexant (BELSOMRA) 10 mg tablet Take 1 Tablet by mouth nightly as needed for Insomnia.  Max Daily Amount: 10 mg. 90 Tablet 3    traZODone (DESYREL) 50 mg tablet Take 1 Tablet by mouth nightly. 90 Tablet 3    sodium bicarbonate 650 mg tablet TAKE 1 TABLET BY MOUTH FOUR TIMES A  Tablet 3    calcium-cholecalciferol, d3, (CALCIUM 600 + D) 600-125 mg-unit tab Take 600 mg by mouth two (2) times a day. Family History:  Family History   Problem Relation Age of Onset    Diabetes Mother        Social History:  Social History     Tobacco Use    Smoking status: Never Smoker    Smokeless tobacco: Never Used   Substance Use Topics    Alcohol use: Not Currently    Drug use: Never       Allergies: Allergies   Allergen Reactions    Gabapentin Vertigo     syncope       All the above components of the past  history are auto-populated from the electronic record. They have been reviewed and the patient has been interviewed for any pertinent past history that pertains to the patient's chief complaint and reason for visit. Not all pre-populated components may be accurate at the time this note was generated. Review of Systems   Review of Systems   Constitutional: Positive for chills and fatigue. Negative for fever. Respiratory: Positive for cough. Negative for shortness of breath. Cardiovascular: Positive for chest pain. Gastrointestinal: Negative for constipation, diarrhea, nausea and vomiting. Genitourinary: Negative for difficulty urinating. Musculoskeletal: Positive for myalgias. Neurological: Negative for dizziness and light-headedness. Physical Exam   Physical Exam  Vitals and nursing note reviewed. Constitutional:       General: She is not in acute distress. Appearance: She is well-developed. She is not ill-appearing. Cardiovascular:      Rate and Rhythm: Normal rate and regular rhythm. Pulmonary:      Effort: Pulmonary effort is normal. No accessory muscle usage or respiratory distress. Skin:     General: Skin is warm and dry. Neurological:      Mental Status: She is alert and oriented to person, place, and time.          Due to the COVID-19 pandemic, in order to reduce the spread and transmission of the virus, some basic elements of the physical exam have been deferred to reduce direct or close contact with the patient unless they are deemed to be absolutely necessary, regardless of whether the virus is highly suspected or not. Diagnostic Study Results     Labs -     Recent Results (from the past 24 hour(s))   EKG, 12 LEAD, INITIAL    Collection Time: 01/07/22  8:47 AM   Result Value Ref Range    Ventricular Rate 89 BPM    Atrial Rate 89 BPM    P-R Interval 158 ms    QRS Duration 68 ms    Q-T Interval 320 ms    QTC Calculation (Bezet) 389 ms    Calculated P Axis 40 degrees    Calculated R Axis -31 degrees    Calculated T Axis 20 degrees    Diagnosis       Normal sinus rhythm  Left axis deviation  Low voltage QRS  Inferior infarct , age undetermined  Possible Anterolateral infarct , age undetermined  When compared with ECG of 05-FEB-1999 09:40,  Significant changes have occurred         Radiologic Studies -   XR CHEST PORT   Final Result   1. Bilateral pleural effusions with associated passive atelectasis and/or   consolidation. 2. Minimal bibasilar interstitial prominence, nonspecific, seen with chronic   lung change, pulmonary vascular congestion and atypical infectious processes. CT Results  (Last 48 hours)    None        CXR Results  (Last 48 hours)               01/07/22 0915  XR CHEST PORT Final result    Impression:  1. Bilateral pleural effusions with associated passive atelectasis and/or   consolidation. 2. Minimal bibasilar interstitial prominence, nonspecific, seen with chronic   lung change, pulmonary vascular congestion and atypical infectious processes. Narrative:  INDICATION: . CP   Additional history: Chest pain   COMPARISON: None. LIMITATIONS: Portable technique. Lisa Major FINDINGS: Single frontal view of the chest.    .   Lines/tubes/surgical: None.     Heart/mediastinum: Calcifications in the aortic arch. Lungs/pleura: Bibasilar interstitial prominence. There is blunting of the   costophrenic angles and partially obscured hemidiaphragms bilaterally. No   visible pneumothorax. Additional Comments: Surgical clips projecting over the upper abdomen. .               Medical Decision Making     I reviewed the vital signs, available nursing notes, past medical history, past surgical history, family history and social history. Vital Signs-I have reviewed the vital signs that have been made available during the patient's emergency department visit. The vital signs auto-populated below are obtained mostly by electronic means through monitoring devices that have been downloaded into the patient's chart by the nursing staff. Some vital signs are not downloaded into the chart until after the patient has been discharged and this note has been completed, therefore some vital signs may not be available to the physician for review prior to patient's discharge or admission. The physician has reviewed the patient's triage vital signs, monitored the electronic monitoring devices remotely for any significant vital sign abnormalities, and have reviewed vital signs prior to discharge. Some vital signs reviewed at bedside or remotely utilizing electronic monitoring devices may be different than the vital signs downloaded into the electronic medical record. Some vital signs may be erroneous and inaccurate since they are obtained by electronic monitoring devices, and not all vital signs are verified for accuracy by nursing staff prior to downloading into the patient's chart. Patient Vitals for the past 24 hrs:   Temp Pulse Resp BP SpO2   01/07/22 0840 97.8 °F (36.6 °C) 89 20 111/80 98 %         Records Reviewed: Nursing notes for today's visit have been reviewed. I have also reviewed most recent medical records pertinent to today's complaints, if available in our medical record system.   I have also reviewed all labs and imaging results from previous results in comparison to results obtained today. If an EKG was obtained today, it has been compared to previous EKGs, if available. If arriving via EMS, the EMS report has been reviewed if made available to us within the patient's time in the emergency department. Provider Notes (Medical Decision Making):   Patient presents with symptoms consistent with a viral illness. Since she is going of chest pain as well as other myalgias EKG was obtained and shows no acute abnormalities, ischemia, or infarction. Chest x-ray does show bilateral atypical pneumonia. Patient has had contact with her daughter who has COVID-19. I highly suspect that she has the viral illness as well. We will start her on antibiotics and a low-dose steroid taper. She is in no respiratory distress. She is not hypoxic. Vital signs are all within normal limits. I do not suspect sepsis. ED Course:   Initial assessment performed. The patients presenting problems have been discussed, and they are in agreement with the care plan formulated and outlined with them. I have encouraged them to ask questions as they arise throughout their visit. Orders Placed This Encounter    XR CHEST PORT    SARS-COV-2    EKG, 12 LEAD, INITIAL    azithromycin (Zithromax Z-Bennie) 250 mg tablet    predniSONE (STERAPRED) 5 mg dose pack       EKG    Date/Time: 1/7/2022 8:47 AM  Performed by: Wayne Blankenship MD  Authorized by: Wayne Blankenship MD     ECG reviewed by ED Physician in the absence of a cardiologist: yes    Rate:     ECG rate:  89    ECG rate assessment: normal    Rhythm:     Rhythm: sinus rhythm    Ectopy:     Ectopy: none    QRS:     QRS axis:  Left  Conduction:     Conduction: normal    ST segments:     ST segments:  Normal  T waves:     T waves: normal            Critical Care Time:   0    Disposition:  Discharge    The patient's emergency department evaluation is now complete.   I have reviewed all labs, imaging, and pertinent information. I have discussed all results with the patient and/or family. Based on our evaluation today I do believe that the patient is safe to be discharged home. The patient has been provided with at home instructions that are pertinent to their complaint today, although these may not be specific to the exact diagnosis. I have reviewed the patient's home medications and attempted to reconcile if not already done so by pharmacy or nursing staff. I have discussed all new prescriptions with the patient. The patient has been encouraged to follow-up with primary care doctor and/or specialist, and these have been discussed with the patient. The patient has been advised that they may return to the emergency department if they have any worsening symptoms and or new symptoms that are of concern to them. Verbal discharge instructions may have also been provided to the patient that may not be specifically contained in the written discharge instructions. The patient has been given opportunity to ask questions prior to discharge. PLAN:  1. Current Discharge Medication List      START taking these medications    Details   azithromycin (Zithromax Z-Bennie) 250 mg tablet Z-Bennie use as directed  Qty: 6 Tablet, Refills: 0  Start date: 1/7/2022      predniSONE (STERAPRED) 5 mg dose pack See administration instruction per 5mg dose pack  Qty: 21 Tablet, Refills: 0  Start date: 1/7/2022         CONTINUE these medications which have NOT CHANGED    Details   predniSONE (DELTASONE) 5 mg tablet TAKE 1 TABLET BY MOUTH ONCE DAILY  Qty: 90 Tablet, Refills: 3           2.    Follow-up Information     Follow up With Specialties Details Why Ramiro Littlejohn, Donavan Watkins DO Internal Medicine Schedule an appointment as soon as possible for a visit in 1 week  [address] [facility]  [phone]          Return to ED if worse Diagnosis     Clinical Impression:   1. Atypical pneumonia    2.  Viral illness

## 2022-01-07 NOTE — ED NOTES
Pt arrives to ED room from triage with a cc of concern for COVID-19, chest pain and back pain. Pts daughter had a positive test result on 1/3/22 and pt lives with daughter. Pt states she does not believe she has COVID but daughter wants her evaluated. Pt is also complaining of back pain after a GLF last week in the bathroom. Pt did not hit head, visible bruising to left lower back. Pt is alert and oriented x 4.

## 2022-01-07 NOTE — DISCHARGE INSTRUCTIONS
It was a pleasure taking care of you in our Emergency Department today. We know that when you come to [facility], you are entrusting us with your health, comfort, and safety. Our physicians and nurses honor that trust, and truly appreciate the opportunity to care for you and your loved ones. We also value your feedback. If you receive a survey about your Emergency Department experience today, please fill it out. We care about our patients' feedback, and we listen to what you have to say. Please read over your discharge instructions as these contain pertinent information to help you in the healing process. These instructions include a list of prescriptions you were given today. Follow-up information is also noted on your discharge papers. There are attached instructions and information pertaining to the reason why you were seen in the emergency department today. These discharge instructions may not be for exactly why you were here, but may be the closest available instructions that we have. These include important advice for things that you can do at home to feel better, and reasons to return to the emergency department. The evaluation and treatment you received in the emergency department is not always definitive care. If follow-up with your primary care doctor or specialist was recommended, it is important that you make these appointments for follow-up care. You may need further testing, procedures, and/or medications to help you feel better. Further tests may be required that are not available in the emergency department. Failure to make these follow-up appointments may jeopardize your health. The emergency department is here for emergent stabilization and evaluation of life and limb threatening illness and/or injuries.   Further care through a specialist or primary care doctor may be required to assist in your healing and complete your treatment and/or evaluation. We may not always be able to make a diagnosis in the emergency department, or things may change that will alter your diagnosis. Our primary goal is to ensure that nothing serious is occurring and that you are stable to continue your treatment and evaluation at home as an outpatient. Of course, if things change, and you feel worse, you are always encouraged to return to the emergency department for re-evaluation. Lab Results Today:  Recent Results (from the past 8 hour(s))   EKG, 12 LEAD, INITIAL    Collection Time: 01/07/22  8:47 AM   Result Value Ref Range    Ventricular Rate 89 BPM    Atrial Rate 89 BPM    P-R Interval 158 ms    QRS Duration 68 ms    Q-T Interval 320 ms    QTC Calculation (Bezet) 389 ms    Calculated P Axis 40 degrees    Calculated R Axis -31 degrees    Calculated T Axis 20 degrees    Diagnosis       Normal sinus rhythm  Left axis deviation  Low voltage QRS  Inferior infarct , age undetermined  Possible Anterolateral infarct , age undetermined  When compared with ECG of 05-FEB-1999 09:40,  Significant changes have occurred          Radiology Results Today:  XR CHEST PORT    Result Date: 1/7/2022  1. Bilateral pleural effusions with associated passive atelectasis and/or consolidation. 2. Minimal bibasilar interstitial prominence, nonspecific, seen with chronic lung change, pulmonary vascular congestion and atypical infectious processes.

## 2022-01-08 LAB
SARS-COV-2, XPLCVT: DETECTED
SOURCE, COVRS: ABNORMAL

## 2022-01-10 ENCOUNTER — PATIENT OUTREACH (OUTPATIENT)
Dept: INTERNAL MEDICINE CLINIC | Age: 72
End: 2022-01-10

## 2022-01-10 NOTE — ACP (ADVANCE CARE PLANNING)
Advance Care Planning:   Does patient have an Advance Directive: not on file; education provided. Pt thinks she has an AMD and will bring it to next appt.       Primary Decision MakerGaye Patient - Daughter - [phone]  Advance Care Planning 1/10/2022   Patient's Healthcare Decision Maker is: Legal Next of Kin   Confirm Advance Directive None

## 2022-01-10 NOTE — PROGRESS NOTES
Patient contacted regarding COVID-19 diagnosis. [facility] ED 22 dx-atypical PNA, viral illness (concern for covid)    Discussed COVID-19 related testing which was available at this time. Test results were positive. Patient informed of results, if available? yes. Pt stated she is feeling better today, still congested. Will take mucinex. Taking zpak and prednisone. Is due for covid booster. Has PCP appt 22. Ambulatory Care Manager contacted the patient by telephone to perform post discharge assessment. Call within 2 business days of discharge: Yes Verified name and  with patient as identifiers. Provided introduction to self, and explanation of the CTN/ACM role, and reason for call due to risk factors for infection and/or exposure to COVID-19. Symptoms reviewed with patient who verbalized the following symptoms: no new symptoms, no worsening symptoms and congestion      Due to no new or worsening symptoms encounter was not routed to provider for escalation. Discussed follow-up appointments. If no appointment was previously scheduled, appointment scheduling offered:  yes. [address] follow up appointment(s):   Future Appointments   Date Time Provider Yoseph Carter   2022 10:00 AM Czajkowski, Merlin Gondola III, DO [facility] BS [facility]     Non-BS follow up appointment(s): none    Interventions to address risk factors: Obtained and reviewed discharge summary and/or continuity of care documents     Advance Care Planning:   Does patient have an Advance Directive: not on file; education provided. Educated patient about risk for severe COVID-19 due to risk factors according to CDC guidelines. ACM reviewed discharge instructions, medical action plan and red flag symptoms with the patient who verbalized understanding. Discussed COVID vaccination status: yes. Education provided on COVID-19 vaccination as appropriate. Discussed exposure protocols and quarantine with CDC Guidelines.  Patient was given an opportunity to verbalize any questions and concerns and agrees to contact ACM or health care provider for questions related to their healthcare. Reviewed and educated patient on any new and changed medications related to discharge diagnosis     Was patient discharged with a pulse oximeter? No. Discussed and confirmed pulse oximeter discharge instructions and when to notify provider or seek emergency care. ACM provided contact information. Plan for follow-up call in 5-7 days based on severity of symptoms and risk factors.

## 2022-01-10 NOTE — PROGRESS NOTES
RE COVID: Attempted to contact patient. Unable to leave voicemail.
RE COVID: Per chart review the patient has been informed of the positive result by patient out reach
electronic

## 2022-01-17 ENCOUNTER — PATIENT OUTREACH (OUTPATIENT)
Dept: INTERNAL MEDICINE CLINIC | Age: 72
End: 2022-01-17

## 2022-01-17 NOTE — PROGRESS NOTES
Patient resolved from [address] Transitions episode on 1/17/22 -Follow up-[facility] ED 1/7/22 dx-atypical PNA, viral illness (concern for covid). Discussed COVID-19 related testing which was available at this time. Test results were positive. Patient informed of results, if available? yes     Patient/family has been provided the following resources and education related to COVID-19:                         Signs, symptoms and red flags related to COVID-19            CDC exposure and quarantine guidelines            [employer] exposure contact - [phone]            Contact for their local Department of Health                 Patient currently reports that the following symptoms have improved:  cough and congestion. No further outreach scheduled with this CTN/ACM/LPN/HC/ MA. Episode of Care resolved. Patient has this CTN/ACM/LPN/HC/MA contact information if future needs arise.

## 2022-01-25 ENCOUNTER — TELEPHONE (OUTPATIENT)
Dept: INTERNAL MEDICINE CLINIC | Age: 72
End: 2022-01-25

## 2022-01-25 NOTE — TELEPHONE ENCOUNTER
Tdap shows as overdue in patient care gaps. Wanted to confirm before scheduling nurse visit that patient is okay to receive Tdap at this time.

## 2022-01-25 NOTE — TELEPHONE ENCOUNTER
----- Message from Sanchez Wellingtons sent at 1/25/2022 10:23 AM EST -----  Subject: Message to Provider    QUESTIONS  Information for Provider? Pt would like to know if she could get the Tdap   vaccine, pls call pt  ---------------------------------------------------------------------------  --------------  CALL BACK INFO  What is the best way for the office to contact you? OK to leave message on   voicemail  Preferred Call Back Phone Number? [phone]  ---------------------------------------------------------------------------  --------------  SCRIPT ANSWERS  Relationship to Patient?  Self

## 2022-01-25 NOTE — TELEPHONE ENCOUNTER
Called patient. ID verified with Name and . Spoke with patient in regards to message received. Informed patient that provider would be okay with patient receiving Tdap. Informed patient that Tdap would not be covered by Medicare if received in office and patient would be able to receive at local pharmacy. Patient states that they understand the information received and has no further questions or concerns at this time.

## 2022-02-03 ENCOUNTER — OFFICE VISIT (OUTPATIENT)
Dept: INTERNAL MEDICINE CLINIC | Age: 72
End: 2022-02-03
Payer: MEDICARE

## 2022-02-03 VITALS
BODY MASS INDEX: 25.12 KG/M2 | RESPIRATION RATE: 20 BRPM | HEIGHT: 65 IN | SYSTOLIC BLOOD PRESSURE: 116 MMHG | OXYGEN SATURATION: 97 % | WEIGHT: 150.8 LBS | TEMPERATURE: 97.3 F | HEART RATE: 101 BPM | DIASTOLIC BLOOD PRESSURE: 83 MMHG

## 2022-02-03 DIAGNOSIS — U07.1 COVID-19 VIRUS INFECTION: ICD-10-CM

## 2022-02-03 DIAGNOSIS — Z94.0 KIDNEY TRANSPLANT RECIPIENT: ICD-10-CM

## 2022-02-03 DIAGNOSIS — M06.9 RHEUMATOID ARTHRITIS INVOLVING SHOULDER, UNSPECIFIED LATERALITY, UNSPECIFIED WHETHER RHEUMATOID FACTOR PRESENT (HCC): ICD-10-CM

## 2022-02-03 DIAGNOSIS — C85.91 NON-HODGKIN LYMPHOMA OF LYMPH NODES OF NECK, UNSPECIFIED NON-HODGKIN LYMPHOMA TYPE (HCC): ICD-10-CM

## 2022-02-03 DIAGNOSIS — D69.6 THROMBOCYTOPENIA (HCC): Primary | ICD-10-CM

## 2022-02-03 DIAGNOSIS — G47.00 INSOMNIA, UNSPECIFIED TYPE: ICD-10-CM

## 2022-02-03 LAB
ANION GAP SERPL CALC-SCNC: 4 MMOL/L (ref 5–15)
BASOPHILS # BLD: 0 K/UL (ref 0–0.1)
BASOPHILS NFR BLD: 0 % (ref 0–1)
BUN SERPL-MCNC: 20 MG/DL (ref 6–20)
BUN/CREAT SERPL: 22 (ref 12–20)
CALCIUM SERPL-MCNC: 9 MG/DL (ref 8.5–10.1)
CHLORIDE SERPL-SCNC: 112 MMOL/L (ref 97–108)
CO2 SERPL-SCNC: 22 MMOL/L (ref 21–32)
CREAT SERPL-MCNC: 0.93 MG/DL (ref 0.55–1.02)
DIFFERENTIAL METHOD BLD: ABNORMAL
EOSINOPHIL # BLD: 0.1 K/UL (ref 0–0.4)
EOSINOPHIL NFR BLD: 1 % (ref 0–7)
ERYTHROCYTE [DISTWIDTH] IN BLOOD BY AUTOMATED COUNT: 15.3 % (ref 11.5–14.5)
GLUCOSE SERPL-MCNC: 116 MG/DL (ref 65–100)
HCT VFR BLD AUTO: 40.9 % (ref 35–47)
HGB BLD-MCNC: 13.6 G/DL (ref 11.5–16)
IMM GRANULOCYTES # BLD AUTO: 0.1 K/UL (ref 0–0.04)
IMM GRANULOCYTES NFR BLD AUTO: 1 % (ref 0–0.5)
LYMPHOCYTES # BLD: 0.8 K/UL (ref 0.8–3.5)
LYMPHOCYTES NFR BLD: 12 % (ref 12–49)
MCH RBC QN AUTO: 36 PG (ref 26–34)
MCHC RBC AUTO-ENTMCNC: 33.3 G/DL (ref 30–36.5)
MCV RBC AUTO: 108.2 FL (ref 80–99)
MONOCYTES # BLD: 0.3 K/UL (ref 0–1)
MONOCYTES NFR BLD: 4 % (ref 5–13)
NEUTS SEG # BLD: 5.6 K/UL (ref 1.8–8)
NEUTS SEG NFR BLD: 82 % (ref 32–75)
NRBC # BLD: 0 K/UL (ref 0–0.01)
NRBC BLD-RTO: 0 PER 100 WBC
PLATELET # BLD AUTO: 164 K/UL (ref 150–400)
PMV BLD AUTO: 10.8 FL (ref 8.9–12.9)
POTASSIUM SERPL-SCNC: 4.6 MMOL/L (ref 3.5–5.1)
RBC # BLD AUTO: 3.78 M/UL (ref 3.8–5.2)
RBC MORPH BLD: ABNORMAL
RBC MORPH BLD: ABNORMAL
SODIUM SERPL-SCNC: 138 MMOL/L (ref 136–145)
WBC # BLD AUTO: 6.9 K/UL (ref 3.6–11)

## 2022-02-03 PROCEDURE — G9899 SCRN MAM PERF RSLTS DOC: HCPCS | Performed by: INTERNAL MEDICINE

## 2022-02-03 PROCEDURE — G8399 PT W/DXA RESULTS DOCUMENT: HCPCS | Performed by: INTERNAL MEDICINE

## 2022-02-03 PROCEDURE — G8536 NO DOC ELDER MAL SCRN: HCPCS | Performed by: INTERNAL MEDICINE

## 2022-02-03 PROCEDURE — G8427 DOCREV CUR MEDS BY ELIG CLIN: HCPCS | Performed by: INTERNAL MEDICINE

## 2022-02-03 PROCEDURE — 99214 OFFICE O/P EST MOD 30 MIN: CPT | Performed by: INTERNAL MEDICINE

## 2022-02-03 PROCEDURE — G8432 DEP SCR NOT DOC, RNG: HCPCS | Performed by: INTERNAL MEDICINE

## 2022-02-03 PROCEDURE — 1101F PT FALLS ASSESS-DOCD LE1/YR: CPT | Performed by: INTERNAL MEDICINE

## 2022-02-03 PROCEDURE — G8419 CALC BMI OUT NRM PARAM NOF/U: HCPCS | Performed by: INTERNAL MEDICINE

## 2022-02-03 PROCEDURE — 1090F PRES/ABSN URINE INCON ASSESS: CPT | Performed by: INTERNAL MEDICINE

## 2022-02-03 PROCEDURE — 3017F COLORECTAL CA SCREEN DOC REV: CPT | Performed by: INTERNAL MEDICINE

## 2022-02-03 NOTE — PROGRESS NOTES
Gage Sheffield is a 66 y.o. female who presents for evaluation of routine follow up. Last seen by me July 22, 2021 in [facility]. She has done well since then. Walks daily, weight down 10 lbs. Overall feels good. Daughter with her today.       ROS:  Constitutional: negative for fevers, chills, anorexia and weight loss  Eyes:   negative for visual disturbance and irritation  ENT:   negative for tinnitus,sore throat,nasal congestion,ear pain,hoarseness  Respiratory:  negative for cough, hemoptysis, dyspnea,wheezing  CV:   negative for chest pain, palpitations, lower extremity edema  GI:   negative for nausea, vomiting, diarrhea, abdominal pain,melena  Genitourinary: negative for frequency, dysuria and hematuria  Musculoskel: negative for myalgias, arthralgias, back pain, muscle weakness, joint pain  Neurological:  negative for headaches, dizziness, focal weakness, numbness  Psychiatric:     Negative for depression or anxiety      Past Medical History:   Diagnosis Date    [facility]     Chronic kidney disease        Past Surgical History:   Procedure Laterality Date    HX GYN      hysterectomy     HX TRANSPLANT  04/03/1997    kidney       Family History   Problem Relation Age of Onset    Diabetes Mother        Social History     Socioeconomic History    Marital status:      Spouse name: Not on file    Number of children: Not on file    Years of education: Not on file    Highest education level: Not on file   Occupational History    Not on file   Tobacco Use    Smoking status: Never Smoker    Smokeless tobacco: Never Used   Substance and Sexual Activity    Alcohol use: Not Currently    Drug use: Never    Sexual activity: Not Currently   Other Topics Concern    Not on file   Social History Narrative    Not on file     Social Determinants of Health     Financial Resource Strain:     Difficulty of Paying Living Expenses: Not on file   Food Insecurity:     Worried About Running Out of Food in the Last Year: Not on file    Ran Out of Food in the Last Year: Not on file   Transportation Needs:     Lack of Transportation (Medical): Not on file    Lack of Transportation (Non-Medical): Not on file   Physical Activity:     Days of Exercise per Week: Not on file    Minutes of Exercise per Session: Not on file   Stress:     Feeling of Stress : Not on file   Social Connections:     Frequency of Communication with Friends and Family: Not on file    Frequency of Social Gatherings with Friends and Family: Not on file    Attends [employer] Services: Not on file    Active Member of [address] [place] [address] [employer] or Organizations: Not on file    Attends Club or Organization Meetings: Not on file    Marital Status: Not on file   Intimate Partner Violence:     Fear of Current or Ex-Partner: Not on file    Emotionally Abused: Not on file    Physically Abused: Not on file    Sexually Abused: Not on file   Housing Stability:     Unable to Pay for Housing in the Last Year: Not on file    Number of [place] in the Last Year: Not on file    Unstable Housing in the Last Year: Not on file            Visit Vitals  /83 (BP 1 Location: Left upper arm, BP Patient Position: Sitting)   Pulse (!) 101   Temp 97.3 °F (36.3 °C) (Temporal)   Resp 20   Ht 5' 5\" (1.651 m)   Wt 150 lb 12.8 oz (68.4 kg)   SpO2 97%   BMI 25.09 kg/m²       Physical Examination:   General - Well appearing female  HEENT - PERRL, TM no erythema/opacification, normal nasal turbinates, no oropharyngeal erythema or exudate, MMM  Neck - supple, no bruits, no thyroidomegaly, no lymphadenopathy  Pulm - clear to auscultation bilaterally  Cardio - RRR, normal S1 S2, no murmur  Abd - soft, nontender, no masses, no HSM  Extrem - no edema, +2 distal pulses  Neuro-  No focal deficits, CN intact     Assessment/Plan:    1. Thrombocytopenia--check cbc again. It does not appear that she ever got the referral to see hematology. 2.  Hx kidney transplant recipient--check bmp. On prednisone, cyclosporing, bicarbonate  3. Insomnia--ambien helps. Trazodone and belsomra did not. 4.  Recent covid 19 pneumonia--has resolved, finished zpak and prednisone taper.     rtc 6 months for awv        Luz Elena Torres III, DO

## 2022-02-03 NOTE — PATIENT INSTRUCTIONS
Thrombocytopenia: Care Instructions  Your Care Instructions     Thrombocytopenia is a low number of platelets in the blood. Platelets are the cells that help blood clot. If you don't have enough of them, your blood cannot clot well. So it is harder to stop bleeding. You may have low platelets because your bone marrow does not make them. Or your body's defenses (immune system) may destroy them. Having an enlarged spleen can also reduce the number of platelets in your blood. This is because they can get trapped in the enlarged spleen. Some diseases or medicines may also cause low platelets. But platelets may go back to normal levels if the disease is treated or the medicine is stopped. You may not need treatment if your problem is mild. If you do need treatment, you may have platelets added to your blood. Or you may get medicine to stop the loss of platelets or help your body make them. Follow-up care is a key part of your treatment and safety. Be sure to make and go to all appointments, and call your doctor if you are having problems. It's also a good idea to know your test results and keep a list of the medicines you take. How can you care for yourself at home? · Be safe with medicines. Take your medicines exactly as prescribed. Call your doctor if you think you are having a problem with your medicine. · Do not take aspirin or anti-inflammatory medicines unless your doctor says it is okay. Examples are ibuprofen (Advil, Motrin) and naproxen (Aleve). They may increase the risk of bleeding. · Avoid contact sports or activities that could cause you to fall. When should you call for help? Call 911 anytime you think you may need emergency care. For example, call if:    · You passed out (lost consciousness).     · You have signs of severe bleeding, which includes:  ? You have a severe headache that is different from past headaches. ? You vomit blood or what looks like coffee grounds.   ? Your stools are maroon or very bloody. Call your doctor now or seek immediate medical care if:    · You are dizzy or lightheaded, or you feel like you may faint.     · You have abnormal bleeding, such as:  ? A nosebleed that you can't easily stop. This means it's still bleeding after pressure has been applied 3 times for 10 minutes each time (30 minutes total). ? Your stools are black and look like tar, or they have streaks of blood. ? You have blood in your urine. ? You have joint pain. ? You have bruises or blood spots under your skin. Watch closely for changes in your health, and be sure to contact your doctor if:    · You do not get better as expected. Where can you learn more? Go to http://www.gray.com/  Enter N724 in the search box to learn more about \"Thrombocytopenia: Care Instructions. \"  Current as of: April 29, 2021               Content Version: 13.0  © 2006-2021 [employer]. Care instructions adapted under license by [employer] (which disclaims liability or warranty for this information). If you have questions about a medical condition or this instruction, always ask your healthcare professional. Rufina Rolling any warranty or liability for your use of this information.

## 2022-02-04 NOTE — PROGRESS NOTES
Letter mailed to patient. Labs look good. Ardean Chiles function remains good, and platelet count is back up to normal.  Stay active, stay well.

## 2022-02-04 NOTE — PROGRESS NOTES
Labs look good. Kidney function remains good, and platelet count is back up to normal.  Stay active, stay well.

## 2022-02-07 RX ORDER — PREDNISONE 5 MG/1
TABLET ORAL
Qty: 90 TABLET | Refills: 3 | Status: SHIPPED | OUTPATIENT
Start: 2022-02-07 | End: 2022-08-09 | Stop reason: SDUPTHER

## 2022-02-25 ENCOUNTER — NURSE TRIAGE (OUTPATIENT)
Dept: OTHER | Facility: CLINIC | Age: 72
End: 2022-02-25

## 2022-02-25 NOTE — TELEPHONE ENCOUNTER
Received call from [facility] with [employer]. Reason for Disposition   Second attempt to contact caller AND no contact made. Phone number verified.     Protocols used: NO CONTACT OR DUPLICATE CONTACT CALL-ADULT-

## 2022-03-01 ENCOUNTER — NURSE TRIAGE (OUTPATIENT)
Dept: OTHER | Facility: CLINIC | Age: 72
End: 2022-03-01

## 2022-03-01 NOTE — TELEPHONE ENCOUNTER
Received call from [facility] with [employer]. Subjective: Caller states \"experiencing dizziness and had to pull over while driving last Friday. Said everything goes black for less than a minute. Said she was tingling all over her body and breathing heavy on Friday afterwards    Current Symptoms: dizziness    Onset: 2 years ago; worsening    Associated Symptoms: NA    Pain Severity: denies pain    Temperature:denies fever    What has been tried: rest    LMP: NA Pregnant: NA    Recommended disposition: See PCP within 3 Days    Care advice provided, patient verbalizes understanding; denies any other questions or concerns; instructed to call back for any new or worsening symptoms. Patient/Caller agrees with recommended disposition; writer provided warm transfer to [facility] for appointment scheduling    Attention Provider: Thank you for allowing me to participate in the care of your patient. The patient was connected to triage in response to information provided to the [facility]. Please do not respond through this encounter as the response is not directed to a shared pool.     Reason for Disposition   MILD dizziness (e.g., walking normally) AND has NOT been evaluated by physician for this (Exception: dizziness caused by heat exposure, sudden standing, or poor fluid intake)    Protocols used: DIZZINESS-ADULT-[place]

## 2022-03-14 ENCOUNTER — TELEPHONE (OUTPATIENT)
Dept: INTERNAL MEDICINE CLINIC | Age: 72
End: 2022-03-14

## 2022-03-14 NOTE — TELEPHONE ENCOUNTER
----- Message from Jany Means sent at 3/14/2022 11:05 AM EDT -----  Subject: Medication Problem    QUESTIONS  Name of Medication? zolpidem (AMBIEN) 5 mg tablet  Patient-reported dosage and instructions? 5mg, once daily at night   What question or problem do you have with the medication? patient called   regarding an 11 day supply of ambien, sleeping pill pt stated that [facility]   already performed the override for a 90 day supply  Preferred Pharmacy? [id number] [place], [address]  [facility] phone number (if available)? [phone]  Additional Information for Provider?   ---------------------------------------------------------------------------  --------------  [address]  What is the best way for the office to contact you? OK to leave message on   voicemail  Preferred Call Back Phone Number? [phone]  ---------------------------------------------------------------------------  --------------  SCRIPT ANSWERS  Relationship to Patient?  Self

## 2022-03-16 DIAGNOSIS — G47.00 INSOMNIA, UNSPECIFIED TYPE: ICD-10-CM

## 2022-03-16 RX ORDER — ZOLPIDEM TARTRATE 5 MG/1
TABLET ORAL
Qty: 10 TABLET | Refills: 0 | OUTPATIENT
Start: 2022-03-16

## 2022-03-16 NOTE — TELEPHONE ENCOUNTER
Called patient. ID verified with Name and . Patient's daughter is requesting a temporary fill of ambien for 10 days until patient receives supply from mail order at this time.

## 2022-03-16 NOTE — TELEPHONE ENCOUNTER
Writer has spoken to daughter in refill request encounter. No further actions required at this time.

## 2022-03-19 PROBLEM — G47.09 OTHER INSOMNIA: Status: ACTIVE | Noted: 2019-06-26

## 2022-03-19 PROBLEM — C85.90 NHL (NON-HODGKIN'S LYMPHOMA) (HCC): Status: ACTIVE | Noted: 2019-12-06

## 2022-03-19 PROBLEM — Z94.0 KIDNEY TRANSPLANT RECIPIENT: Status: ACTIVE | Noted: 2019-12-06

## 2022-03-19 PROBLEM — E66.9 OBESITY (BMI 30-39.9): Status: ACTIVE | Noted: 2019-06-26

## 2022-03-20 PROBLEM — M06.9 RHEUMATOID ARTHRITIS (HCC): Status: ACTIVE | Noted: 2019-12-06

## 2022-03-21 ENCOUNTER — OFFICE VISIT (OUTPATIENT)
Dept: INTERNAL MEDICINE CLINIC | Age: 72
End: 2022-03-21
Payer: MEDICARE

## 2022-03-21 VITALS
RESPIRATION RATE: 18 BRPM | SYSTOLIC BLOOD PRESSURE: 122 MMHG | HEART RATE: 96 BPM | HEIGHT: 65 IN | BODY MASS INDEX: 24.76 KG/M2 | TEMPERATURE: 97.1 F | DIASTOLIC BLOOD PRESSURE: 88 MMHG | OXYGEN SATURATION: 99 % | WEIGHT: 148.6 LBS

## 2022-03-21 DIAGNOSIS — R42 POSTURAL DIZZINESS WITH NEAR SYNCOPE: Primary | ICD-10-CM

## 2022-03-21 DIAGNOSIS — R68.89 FORGETFULNESS: ICD-10-CM

## 2022-03-21 DIAGNOSIS — R55 POSTURAL DIZZINESS WITH NEAR SYNCOPE: Primary | ICD-10-CM

## 2022-03-21 DIAGNOSIS — C85.91 NON-HODGKIN LYMPHOMA OF LYMPH NODES OF NECK, UNSPECIFIED NON-HODGKIN LYMPHOMA TYPE (HCC): ICD-10-CM

## 2022-03-21 DIAGNOSIS — R42 EPISODIC LIGHTHEADEDNESS: ICD-10-CM

## 2022-03-21 DIAGNOSIS — Z94.0 KIDNEY TRANSPLANT RECIPIENT: ICD-10-CM

## 2022-03-21 PROCEDURE — 3017F COLORECTAL CA SCREEN DOC REV: CPT | Performed by: INTERNAL MEDICINE

## 2022-03-21 PROCEDURE — G8536 NO DOC ELDER MAL SCRN: HCPCS | Performed by: INTERNAL MEDICINE

## 2022-03-21 PROCEDURE — G8399 PT W/DXA RESULTS DOCUMENT: HCPCS | Performed by: INTERNAL MEDICINE

## 2022-03-21 PROCEDURE — G8420 CALC BMI NORM PARAMETERS: HCPCS | Performed by: INTERNAL MEDICINE

## 2022-03-21 PROCEDURE — 1090F PRES/ABSN URINE INCON ASSESS: CPT | Performed by: INTERNAL MEDICINE

## 2022-03-21 PROCEDURE — G9899 SCRN MAM PERF RSLTS DOC: HCPCS | Performed by: INTERNAL MEDICINE

## 2022-03-21 PROCEDURE — 1101F PT FALLS ASSESS-DOCD LE1/YR: CPT | Performed by: INTERNAL MEDICINE

## 2022-03-21 PROCEDURE — G8427 DOCREV CUR MEDS BY ELIG CLIN: HCPCS | Performed by: INTERNAL MEDICINE

## 2022-03-21 PROCEDURE — 99214 OFFICE O/P EST MOD 30 MIN: CPT | Performed by: INTERNAL MEDICINE

## 2022-03-21 PROCEDURE — G8432 DEP SCR NOT DOC, RNG: HCPCS | Performed by: INTERNAL MEDICINE

## 2022-03-21 NOTE — PATIENT INSTRUCTIONS
Lightheadedness or Faintness: Care Instructions  Your Care Instructions  Lightheadedness is a feeling that you are about to faint or \"pass out. \" You do not feel as if you or your surroundings are moving. It is different from vertigo, which is the feeling that you or things around you are spinning or tilting. Lightheadedness usually goes away or gets better when you lie down. If lightheadedness gets worse, it can lead to a fainting spell. It is common to feel lightheaded from time to time. Lightheadedness usually is not caused by a serious problem. It often is caused by a short-lasting drop in blood pressure and blood flow to your head that occurs when you get up too quickly from a seated or lying position. Follow-up care is a key part of your treatment and safety. Be sure to make and go to all appointments, and call your doctor if you are having problems. It's also a good idea to know your test results and keep a list of the medicines you take. How can you care for yourself at home? · Lie down for 1 or 2 minutes when you feel lightheaded. After lying down, sit up slowly and remain sitting for 1 to 2 minutes before slowly standing up. · Avoid movements, positions, or activities that have made you lightheaded in the past.  · Get plenty of rest, especially if you have a cold or flu, which can cause lightheadedness. · Make sure you drink plenty of fluids, especially if you have a fever or have been sweating. · Do not drive or put yourself and others in danger while you feel lightheaded. When should you call for help? Call 911 anytime you think you may need emergency care. For example, call if:    · You have symptoms of a stroke. These may include:  ? Sudden numbness, tingling, weakness, or loss of movement in your face, arm, or leg, especially on only one side of your body. ? Sudden vision changes. ? Sudden trouble speaking. ? Sudden confusion or trouble understanding simple statements.   ? Sudden problems with walking or balance. ? A sudden, severe headache that is different from past headaches.     · You have symptoms of a heart attack. These may include:  ? Chest pain or pressure, or a strange feeling in the chest.  ? Sweating. ? Shortness of breath. ? Nausea or vomiting. ? Pain, pressure, or a strange feeling in the back, neck, jaw, or upper belly or in one or both shoulders or arms. ? Lightheadedness or sudden weakness. ? A fast or irregular heartbeat. After you call 911, the  may tell you to chew 1 adult-strength or 2 to 4 low-dose aspirin. Wait for an ambulance. Do not try to drive yourself. Watch closely for changes in your health, and be sure to contact your doctor if:    · Your lightheadedness gets worse or does not get better with home care. Where can you learn more? Go to http://www.gray.com/  Enter [id number] in the search box to learn more about \"Lightheadedness or Faintness: Care Instructions. \"  Current as of: July 1, 2021               Content Version: 13.2  © 4451-8999 [employer]. Care instructions adapted under license by [employer] (which disclaims liability or warranty for this information). If you have questions about a medical condition or this instruction, always ask your healthcare professional. [address] any warranty or liability for your use of this information.

## 2022-03-21 NOTE — PROGRESS NOTES
Sara Ramey is a 74 y.o. female who presents for evaluation of episodic lightheadeness. Last seen by me feb 3, 2022. She states that she has been having these episodes for over 2 years now. In past, only happened when she would get up from supine position quickly. However, about 3 weeks ago, she had an episode when she was in grocery store, and had been pushing her cart around. She sat down and her spell resolved, but occurred again when she was driving herself home shortly thereafter. Has not had any more spells since then. Daughter is with her today, and she is also concerned about increased memory issues with her mom. Saw neuropsychology last year, and testing was ok.       ROS:  Constitutional: negative for fevers, chills, anorexia and weight loss  Eyes:   negative for visual disturbance and irritation  ENT:   negative for tinnitus,sore throat,nasal congestion,ear pain,hoarseness  Respiratory:  negative for cough, hemoptysis, dyspnea,wheezing  CV:   negative for chest pain, palpitations, lower extremity edema  GI:   negative for nausea, vomiting, diarrhea, abdominal pain,melena  Genitourinary: negative for frequency, dysuria and hematuria  Musculoskel: negative for myalgias, arthralgias, back pain, muscle weakness, joint pain  Neurological:  negative for headaches, dizziness, focal weakness, numbness  Psychiatric:     Negative for depression or anxiety      Past Medical History:   Diagnosis Date    [facility] ([facility]     Chronic kidney disease        Past Surgical History:   Procedure Laterality Date    HX GYN      hysterectomy     HX TRANSPLANT  04/03/1997    kidney       Family History   Problem Relation Age of Onset    Diabetes Mother        Social History     Socioeconomic History    Marital status:      Spouse name: Not on file    Number of children: Not on file    Years of education: Not on file    Highest education level: Not on file   Occupational History    Not on file Tobacco Use    Smoking status: Never Smoker    Smokeless tobacco: Never Used   Substance and Sexual Activity    Alcohol use: Not Currently    Drug use: Never    Sexual activity: Not Currently   Other Topics Concern    Not on file   Social History Narrative    Not on file     Social Determinants of Health     Financial Resource Strain:     Difficulty of Paying Living Expenses: Not on file   Food Insecurity:     Worried About Running Out of Food in the Last Year: Not on file    Bonnie of Food in the Last Year: Not on file   Transportation Needs:     Lack of Transportation (Medical): Not on file    Lack of Transportation (Non-Medical):  Not on file   Physical Activity:     Days of Exercise per Week: Not on file    Minutes of Exercise per Session: Not on file   Stress:     Feeling of Stress : Not on file   Social Connections:     Frequency of Communication with Friends and Family: Not on file    Frequency of Social Gatherings with Friends and Family: Not on file    Attends [employer]oration[employer] Services: Not on file    Active Member of [address] [place] [address] or Organizations: Not on file    Attends Club or Organization Meetings: Not on file    Marital Status: Not on file   Intimate Partner Violence:     Fear of Current or Ex-Partner: Not on file    Emotionally Abused: Not on file    Physically Abused: Not on file    Sexually Abused: Not on file   Housing Stability:     Unable to Pay for Housing in the Last Year: Not on file    Number of [place] in the Last Year: Not on file    Unstable Housing in the Last Year: Not on file            Visit Vitals  /88 (BP 1 Location: Left upper arm, BP Patient Position: Sitting)   Pulse 96   Temp 97.1 °F (36.2 °C) (Temporal)   Resp 18   Ht 5' 5\" (1.651 m)   Wt 148 lb 9.6 oz (67.4 kg)   SpO2 99%   BMI 24.73 kg/m²       Physical Examination:   General - Well appearing female  HEENT - PERRL, TM no erythema/opacification, normal nasal turbinates, no oropharyngeal erythema or exudate, MMM  Neck - supple, no bruits, no thyroidomegaly, no lymphadenopathy  Pulm - clear to auscultation bilaterally  Cardio - RRR, normal S1 S2, no murmur  Abd - soft, nontender, no masses, no HSM  Extrem - no edema, +2 distal pulses  Neuro-  No focal deficits, CN intact     Assessment/Plan:    1. Intermittent postural lightheadedness with near syncope--encouraged to stay hydrated. Ideally would check bp when she has a spell. Advised to get up more slowly. 2.  Increased forgetfulness--already did neuropsych testing and it was unremarkable. Referral to neurology, dr Diana García, for his thoughts. 3.  Hx kidney transplant recipient--on prednisone, cyclosporine, bicarbonate  4.  Insomnia--prn ambien helps  5.  covid 19 infection, jan 2022--resolved, no lingering symptoms    rtc for regular appt        Kristian Mccoy III, DO

## 2022-03-21 NOTE — PROGRESS NOTES
1. \"Have you been to the ER, urgent care clinic since your last visit? Hospitalized since your last visit? \" no    2. \"Have you seen or consulted any other health care providers outside of the [address] [place] [address] since your last visit? \"  no    3. For patients aged 39-70: Has the patient had a colonoscopy / FIT/ Cologuard? Yes, due in april      If the patient is female:    4. For patients aged 41-77: Has the patient had a mammogram within the past 2 years?  yes

## 2022-03-22 NOTE — PROGRESS NOTES
Neurology Note    Patient ID:  Rachelle Ramos  [id number]  16 y.o.  1950      Date of Consultation:  March 23, 2022    Referring Physician: Dr. Danielle Delaney    Reason for Consultation:  dizziness      Assessment and Plan:    The patient is a 64-year-old female with multiple medical conditions who presents with neurological concerns. These do include episodes of dizziness, balance difficulties, memory concerns. Her examination does reveal mild cognitive impairment and a rather significant length dependent peripheral neuropathy. Peripheral neuropathy:  Differential includes metabolic, inflammatory, infectious, medication induced. Her chronic prednisone and cyclosporine may be contributing to her neuropathy. We will obtain additional serology looking for causes of a peripheral neuropathy. I will obtain an EMG/nerve conduction study to determine the severity of the neuropathy and the nerve type involved. I did discuss with her that I think she should get a cane or hiking stick to help with her balance. Neuropathy:  we reviewed the causes contributing to the neuropathy. We discussed the importance of exercise and activity. I also reviewed the importance of safety with ambulation and ways to prevents falls. Episodes of dizziness:  Differential includes associated with her neuropathy and having autonomic involvement, medication induced-cyclosporine, metabolic. We will start with EMG/nerve conduction study. I may need to consider autonomic testing  I will obtain a carotid Doppler study to look at ensuring there is good cerebrovascular flow. Memory loss:  Differential includes early onset dementia, pseudodementia associated with stress and anxiety, posterior leukoencephalopathy associated with cyclosporine  Prior neuropsychological testing was reviewed.   I will obtain an brain MRI to look for signs of vascular disease or other signs of dementia    Prior kidney transplant -  followed by nephrology and primary care           Subjective: I have dizziness and other symptoms     History of Present Illness:   Jackeline Kang is a 74 y.o. female who was referred to the neurology clinic at [facility] for evaluation. The patient was referred for episodic lightheadedness and dizziness. The events have been going on for approximately 2 years now. Appears that she just began to tell her family and her primary care doctor recently about this. She does have episodes when she changes positions but she will have episodes when she is just sitting or when she is just laying down. She states that she feels lightheaded and dizzy and her vision may go black. This only last for a few seconds. There is no alteration in her level of consciousness. This may happen a couple times a week. They are worse when she is standing but they do occur when she is lying down. She is also noticed increasing tingling in her arms and her legs over the last couple years. It is intermittent but becoming more frequent. She did have 1 these episodes of vision changes and dizziness while she was driving. She was able to get off the road. There is no change in her level of consciousness. There is also concerned about her memory. Her family members were with her today noticed that her personality has changed and that her memory has gotten worse over the past year. She is forgetting who family members were. She does not remember the name sometimes of her granddaughters. She does remember the distant past much more than more recent events. Upon a review of the medical records, the patient did have neuropsychological testing performed in February 2021. The patient did have age-related cognitive decline. There was no definitive evidence of a dementia at that time    Daughter and granddaughter were present during the visit and provided additional information.     Past Medical History:   Diagnosis Date    Cancer ([facility] [address]     throat    Chronic kidney disease         Past Surgical History:   Procedure Laterality Date    HX GYN      hysterectomy     HX TRANSPLANT  04/03/1997    kidney        Family History   Problem Relation Age of Onset    Diabetes Mother     No Known Problems Father         Social History     Tobacco Use    Smoking status: Never Smoker    Smokeless tobacco: Never Used   Substance Use Topics    Alcohol use: Not Currently        Allergies   Allergen Reactions    Gabapentin Vertigo     syncope        Prior to Admission medications    Medication Sig Start Date End Date Taking? Authorizing Provider   zolpidem (AMBIEN) 5 mg tablet TAKE 1 TABLET BY MOUTH EVERY DAY NIGHTLY FOR SLEEP 3/15/22  Yes Jose Henriquez III, DO   predniSONE (DELTASONE) 5 mg tablet TAKE 1 TABLET BY MOUTH ONCE DAILY 2/7/22  Yes Jose Henriquez III, DO   cycloSPORINE (SandIMMUNE) 25 mg capsule Take 3 Capsules by mouth two (2) times a day. Dx code: Z94.0 10/12/21  Yes Melissa Hare DO   ondansetron (ZOFRAN ODT) 4 mg disintegrating tablet Take 1 Tablet by mouth every eight (8) hours as needed for Nausea or Vomiting. 9/13/21  Yes Melissa Hare DO   sodium bicarbonate 650 mg tablet TAKE 1 TABLET BY MOUTH FOUR TIMES A DAY 6/27/21  Yes Jose Henriquez III, DO   calcium-cholecalciferol, d3, (CALCIUM 600 + D) 600-125 mg-unit tab Take 600 mg by mouth two (2) times a day.    Yes Provider, Historical       Review of Systems:    General, constitutional: dizziness  Eyes, vision: negative  Ears, nose, throat: negative  Cardiovascular, heart: negative  Respiratory: negative  Gastrointestinal: negative  Genitourinary: negative  Musculoskeletal: negative  Skin and integumentary: negative  Psychiatric: negative  Endocrine: negative  Neurological: negative, except for HPI  Hematologic/lymphatic: negative  Allergy/immunology: negative    Objective:     Visit Vitals  BP (!) 136/96   Pulse 82   Ht 5' 5\" (1.651 m)   Wt 147 lb (66.7 kg)   SpO2 100%   BMI 24.46 kg/m²       Physical Exam:      General:  appears well nourished in no acute distress  Neck: no carotid bruits  Lungs: clear to auscultation  Heart:  no murmurs, regular rate  Lower extremity: peripheral pulses palpable and no edema  Skin: intact    Neurological exam:    Patient is awake and alert. She is oriented to month, year, day of the week. She was able to name objects correctly. She could perform simple calculations. She had good registration. She could remember 3 out of 5 objects at 5 minutes. She could name 11 words a begin with a T in 1 minute. She could perform similarities and differences. She could name all 3 objects. Cranial nerves:   II-XII were tested    Perrrla  Fundoscopic examination revealed venous pulsations and no clear abnormalities  Visual fields were full  Eomi, no evidence of nystagmus  Facial sensation:  normal and symmetric  Facial motor: normal and symmetric  Hearing intact  SCM strength intact  Tongue: midline without fasciculations    Motor: Tone normal  Pronator drift was absent    No evidence of fasciculations    Strength testing:   deltoid triceps biceps Wrist ext. Wrist flex. intrinsics Hip flex. Hip ext. Knee ext. Knee flex Dorsi flex Plantar flex   Right 5 5 5 5 5 5 5 5 5 5 5 5   Left 5 5 5 5 5 5 5 5 5 5 5 5         Sensory:  Upper extremity: intact to pp, light touch, and vibration > 10 seconds  Lower extremity: Vibration was absent at her toes and present for only 2 seconds at her ankles. It was  present for 6 seconds at the knees. Pinprick was decreased to the level of mid shin bilaterally. Reflexes:    Right Left  Biceps  2 2  Triceps 2 2  Brachiorad. 2 2  Patella  1 1  Achilles - -    Plantar response:  flexor bilaterally      Cerebellar testing:  no resting tremor apparent, finger/nose and arpan were intact. Mild essential tremor    Romberg: present with eyes open    Gait: steady. Widebased.   She was unable to walk on her Heel, toe, and tandem walking    Labs:     Lab Results   Component Value Date/Time    Hemoglobin A1c 4.9 01/28/2020 08:07 AM    Sodium 138 02/03/2022 12:06 PM    Potassium 4.6 02/03/2022 12:06 PM    Chloride 112 (H) 02/03/2022 12:06 PM    Glucose 116 (H) 02/03/2022 12:06 PM    BUN 20 02/03/2022 12:06 PM    Creatinine 0.93 02/03/2022 12:06 PM    Calcium 9.0 02/03/2022 12:06 PM    WBC 6.9 02/03/2022 12:06 PM    HCT 40.9 02/03/2022 12:06 PM    HGB 13.6 02/03/2022 12:06 PM    PLATELET 573 50/26/0615 12:06 PM       Imaging:    Results from Hospital Encounter encounter on 03/13/20    MRI BRAIN WO CONT    Narrative  EXAM:  MRI BRAIN WO CONT  INDICATION:  new off balance and decreased hearing R ear, and sensation change  right face, R 26.89, imbalance, H 91.91, H 93. A1 right ear tinnitus,  TECHNIQUE:  Sagittal T1, axial FLAIR, T2,T1 and gradient echo images as well as coronal T2  weighted images and axial diffusion weighted images of the head were obtained. Additional thin axial T1-weighted images through the posterior fossa/temporal  bones and axial T2 cisternogram through the temporal bone/IACs. COMPARISON:  None available  FINDINGS:  There are bilateral mastoid effusions and some fluid is likely present in the  middle ear cavities. There is relatively thin bone over the mastoid air cells. Some irregularity of CSF spaces and adjacent brain greater on the right raises  the possibility of communication with the CSF and the mastoid. There is no  abnormality in the nasopharynx to explain obstruction of eustachian tube or  other etiologies. Patient is noted to have a chronically enlarged partial empty  sella which can be associated with IIH (idiopathic intracranial hypertension). The left maxillary sinus is completely opacified. Next or other mucosal  thickening and fluid. The fluid has prominent restricted diffusion raising the  possibility/infection in the left maxillary sinus.  There may also be similar  findings in the left anterior ethmoid/frontal sinus. Flow voids are present in vertebral basilar and carotid artery systems. Ventricular size and configuration are normal.  Scattered foci T2 hyperintensity in cerebral white matter without associated  abnormal restricted diffusion. Nonspecific pattern possibly related to chronic  small vessel ischemic change. No evidence of intracranial hemorrhage, mass or abnormal extra-axial fluid  collections. No evidence of brain infarction. Impression  IMPRESSION:  1. Bilateral mastoid and possibly middle ear effusions in patient with  chronically enlarged partial empty sella raises possibility of CSF/mastoid  fistula secondary to chronic IIH. Correlate clinically and consider  high-resolution CT temporal bone for further evaluation. 2. Left maxillary sinus greater than left frontal sinus opacification with  restricted diffusion. This may represent infection in the left maxillary sinus. 3. Nonspecific white matter T2 hyperintensity is probably chronic. 23X      No results found for this or any previous visit. I did independently review the brain MRI from March 13, 2020. There was bilateral mastoid and middle ear effusions. There is nonspecific chronic microvascular ischemic changes. In March 2021 vitamin B12 level was 240. Thyroid was normal         Patient Active Problem List   Diagnosis Code    Obesity (BMI 30-39. 9) E66.9    Other insomnia G47.09    Rheumatoid arthritis (HCC) M06.9    NHL (non-Hodgkin's lymphoma) ([facility]) C85.90    Kidney transplant recipient Z80.0      The patient should return to clinic in 3-6 months    Renewed medication:none today    I spent   70  minutes on the day of the encounter preparing the office visit by reviewing medical records, obtaining a history, performing examination, counseling and educating the patient and family members on diagnosis, ordering tests, documenting in the clinical medical record, and coordinating the care for the patient. The patient had the ability to ask questions and all questions were answered.                  Signed By:  Selwyn Padilla DO FAAN    March 23, 2022

## 2022-03-23 ENCOUNTER — OFFICE VISIT (OUTPATIENT)
Dept: NEUROLOGY | Age: 72
End: 2022-03-23
Payer: MEDICARE

## 2022-03-23 VITALS
SYSTOLIC BLOOD PRESSURE: 136 MMHG | WEIGHT: 147 LBS | BODY MASS INDEX: 24.49 KG/M2 | DIASTOLIC BLOOD PRESSURE: 96 MMHG | HEART RATE: 82 BPM | OXYGEN SATURATION: 100 % | HEIGHT: 65 IN

## 2022-03-23 DIAGNOSIS — R42 DIZZINESS: ICD-10-CM

## 2022-03-23 DIAGNOSIS — G62.9 NEUROPATHY: ICD-10-CM

## 2022-03-23 DIAGNOSIS — G60.9 IDIOPATHIC PERIPHERAL NEUROPATHY: ICD-10-CM

## 2022-03-23 DIAGNOSIS — R41.3 MEMORY LOSS: Primary | ICD-10-CM

## 2022-03-23 PROCEDURE — G8536 NO DOC ELDER MAL SCRN: HCPCS | Performed by: PSYCHIATRY & NEUROLOGY

## 2022-03-23 PROCEDURE — G8420 CALC BMI NORM PARAMETERS: HCPCS | Performed by: PSYCHIATRY & NEUROLOGY

## 2022-03-23 PROCEDURE — 1101F PT FALLS ASSESS-DOCD LE1/YR: CPT | Performed by: PSYCHIATRY & NEUROLOGY

## 2022-03-23 PROCEDURE — 3017F COLORECTAL CA SCREEN DOC REV: CPT | Performed by: PSYCHIATRY & NEUROLOGY

## 2022-03-23 PROCEDURE — G8427 DOCREV CUR MEDS BY ELIG CLIN: HCPCS | Performed by: PSYCHIATRY & NEUROLOGY

## 2022-03-23 PROCEDURE — G9899 SCRN MAM PERF RSLTS DOC: HCPCS | Performed by: PSYCHIATRY & NEUROLOGY

## 2022-03-23 PROCEDURE — 1090F PRES/ABSN URINE INCON ASSESS: CPT | Performed by: PSYCHIATRY & NEUROLOGY

## 2022-03-23 PROCEDURE — G8432 DEP SCR NOT DOC, RNG: HCPCS | Performed by: PSYCHIATRY & NEUROLOGY

## 2022-03-23 PROCEDURE — G8399 PT W/DXA RESULTS DOCUMENT: HCPCS | Performed by: PSYCHIATRY & NEUROLOGY

## 2022-03-23 PROCEDURE — 99205 OFFICE O/P NEW HI 60 MIN: CPT | Performed by: PSYCHIATRY & NEUROLOGY

## 2022-03-23 NOTE — LETTER
3/23/2022    Patient: Sara Ramey   YOB: 1950   Date of Visit: 3/23/2022     Christa Lius III, DO  [address] [facility] [address]  [address] [address]  [place]    Dear Ricardo Carter,      Thank you for referring Ms. Sky Sandoval to [facility] for evaluation. My notes for this consultation are attached. If you have questions, please do not hesitate to call me. I look forward to following your patient along with you.       Sincerely,    Reyes Pitts, DO

## 2022-03-24 ENCOUNTER — TELEPHONE (OUTPATIENT)
Dept: INTERNAL MEDICINE CLINIC | Age: 72
End: 2022-03-24

## 2022-03-24 NOTE — TELEPHONE ENCOUNTER
----- Message from Liseth Stone sent at 3/24/2022 10:21 AM EDT -----  Subject: Message to Provider    QUESTIONS  Information for Provider? Patient daughter called in to get a message to   patient pcp. Patient has been vomiting and diarrhea for two days and she   can't keep anything down not even a sip of water. Please advise  ---------------------------------------------------------------------------  --------------  CALL BACK INFO  What is the best way for the office to contact you? OK to leave message on   voicemail  Preferred Call Back Phone Number? [phone]  ---------------------------------------------------------------------------  --------------  SCRIPT ANSWERS  Relationship to Patient? Other  Representative Name? Darlene Seals  Is the Representative on the appropriate HIPAA document in Epic?  Yes

## 2022-03-24 NOTE — TELEPHONE ENCOUNTER
Called patient. ID verified with Name and . Spoke with daughter in regards to patient at this time. Daughter states that she has been trying to convince patient to go to urgent care and patient states that she did not feel like going anywhere. Writer provided daughter with information to [facility] at this time. Daughter states that she understands the information received and has no further questions or concerns at this time.

## 2022-04-20 ENCOUNTER — TELEPHONE (OUTPATIENT)
Dept: NEUROLOGY | Age: 72
End: 2022-04-20

## 2022-04-27 NOTE — TELEPHONE ENCOUNTER
Called and instructed patient per Dr. Lulu Velez note to start taking 1,000mcg of Vit B12. Patient stated understanding.

## 2022-04-27 NOTE — TELEPHONE ENCOUNTER
----- Message from Lino Uribe DO sent at 3/24/2022  7:33 AM EDT -----  Hi,Your vitamin B12 level was just over 200. I would like you to take a 1000 mcg of vitamin B12 daily.  You can get this over the counterThanks,Dr. Redd Cevallos

## 2022-05-05 ENCOUNTER — HOSPITAL ENCOUNTER (OUTPATIENT)
Dept: MRI IMAGING | Age: 72
Discharge: HOME | End: 2022-05-05
Attending: PSYCHIATRY & NEUROLOGY
Payer: MEDICARE

## 2022-05-05 DIAGNOSIS — R41.3 MEMORY LOSS: ICD-10-CM

## 2022-05-05 PROCEDURE — 70551 MRI BRAIN STEM W/O DYE: CPT

## 2022-05-09 ENCOUNTER — TELEPHONE (OUTPATIENT)
Dept: INTERNAL MEDICINE CLINIC | Age: 72
End: 2022-05-09

## 2022-05-09 NOTE — TELEPHONE ENCOUNTER
----- Message from Enma Damon sent at 5/9/2022  4:32 PM EDT -----  Subject: Message to Provider    QUESTIONS  Information for Provider? The patient daughter Lewie Anton called the office   back returning a call for her mother. she stated that the appointment is   needed to discuss pt medication . she can be reached at the number listed   on file for the patient  ---------------------------------------------------------------------------  --------------  [address]  What is the best way for the office to contact you? OK to leave message on   voicemail  Preferred Call Back Phone Number?  [phone]  ---------------------------------------------------------------------------  --------------  SCRIPT ANSWERS  undefined

## 2022-05-09 NOTE — TELEPHONE ENCOUNTER
----- Message from Shira Schaeffer sent at 5/9/2022 11:59 AM EDT -----  Subject: Appointment Request    Reason for Call: Routine Existing Condition Follow Up    QUESTIONS  Type of Appointment? Established Patient  Reason for appointment request? No appointments available during search  Additional Information for Provider? Pt would like to schedule appt to   speak with DR Jared Monterroso about sleeping meds. Any day or time is fine with   pt  ---------------------------------------------------------------------------  --------------  CALL BACK INFO  What is the best way for the office to contact you? OK to leave message on   voicemail  Preferred Call Back Phone Number? [phone]  ---------------------------------------------------------------------------  --------------  SCRIPT ANSWERS  Relationship to Patient? Self  Is this follow up request related to routine Diabetes Management? No  Have you been diagnosed with, awaiting test results for, or told that you   are suspected of having COVID-19 (Coronavirus)? (If patient has tested   negative or was tested as a requirement for work, school, or travel and   not based on symptoms, answer no)? No  Within the past 10 days have you developed any of the following symptoms   (answer no if symptoms have been present longer than 10 days or began   more than 10 days ago)? Fever or Chills, Cough, Shortness of breath or   difficulty breathing, Loss of taste or smell, Sore throat, Nasal   congestion, Sneezing or runny nose, Fatigue or generalized body aches   (answer no if pain is specific to a body part e.g. back pain), Diarrhea,   Headache? No  Have you had close contact with someone with COVID-19 in the last 7 days? No  (Service Expert  click yes below to proceed with [employer] As Usual   Scheduling)?  Yes

## 2022-05-09 NOTE — PROGRESS NOTES
Hi,    Your brain mri revealed no evidence of a stroke or significant atrophy. Thanks.    Dr. Magui Houston

## 2022-05-10 NOTE — TELEPHONE ENCOUNTER
Called patient. ID verified with Name and . Spoke with daughter in regards to message received. Daughter requesting appt with provider at this time for concerns of sleep walking and possible changes to medication. Patient scheduled for appt at this time. No further actions required at this time.

## 2022-05-11 NOTE — PROGRESS NOTES
Spoke with Balaji Howard on [address] [place] [address] St Verified patient with two identifiers. Informed of MRI results and confirmed upcoming appointments.

## 2022-05-26 ENCOUNTER — OFFICE VISIT (OUTPATIENT)
Dept: NEUROLOGY | Age: 72
End: 2022-05-26
Payer: MEDICARE

## 2022-05-26 DIAGNOSIS — M79.662 PAIN IN BOTH LOWER LEGS: ICD-10-CM

## 2022-05-26 DIAGNOSIS — R26.89 BALANCE DISORDER: ICD-10-CM

## 2022-05-26 DIAGNOSIS — R20.2 NUMBNESS AND TINGLING OF BOTH LEGS BELOW KNEES: ICD-10-CM

## 2022-05-26 DIAGNOSIS — M79.661 PAIN IN BOTH LOWER LEGS: ICD-10-CM

## 2022-05-26 DIAGNOSIS — R20.0 NUMBNESS AND TINGLING OF BOTH LEGS BELOW KNEES: ICD-10-CM

## 2022-05-26 PROCEDURE — 95886 MUSC TEST DONE W/N TEST COMP: CPT | Performed by: PSYCHIATRY & NEUROLOGY

## 2022-05-26 PROCEDURE — 95910 NRV CNDJ TEST 7-8 STUDIES: CPT | Performed by: PSYCHIATRY & NEUROLOGY

## 2022-05-26 PROCEDURE — 95885 MUSC TST DONE W/NERV TST LIM: CPT | Performed by: PSYCHIATRY & NEUROLOGY

## 2022-05-26 NOTE — PROCEDURES
ELECTRODIAGNOSTIC REPORT      Test Date:  2022    Patient: Tiara Rader : 3/10/2050 Physician: Dr. Delaney Sandhoff    ID#: [id number] SEX: Female Ref. Phys: Dr. Delaney Sandhoff     Patient History / Exam:    Patient is a pleasant 49-year-old female who presents with sensory disturbance, numbness, tingling in her bilateral lower extremities with balance difficulties. Her examination does reveal distal sensory loss to pinprick. EMG & NCV Findings:      Nerve conduction studies as listed below in the bilateral lower extremities were within reference of normal.    Disposable concentric needle examination of the muscles listed below in the bilateral lower extremities  was normal.    Interpretation: This study was normal.  There was no electrodiagnostic evidence upon todays examination suggesting a focal or generalized large fiber neuropathy, myopathy, or radiculopathy. This study cannot exclude a small fiber neuropathy. Clinical correlation is recommended.      ___________________________  Mari Elizalde. Gisselle ABREU   FAAN        Nerve Conduction Studies  Anti Sensory Summary Table     Stim Site NR Onset (ms) Peak (ms) O-P Amp (µV) Norm Peak (ms) Norm O-P Amp Site1 Site2 Dist (cm) Norm Chris (m/s)   Left Sup Fibular Anti Sensory (Lat ankle)  32.3°C   Lower leg    1.8 2.2 9.3 <4.4 >5.0 Lower leg Lat ankle 10.0 >32   Right Sup Fibular Anti Sensory (Lat ankle)  32.3°C   Lower leg    1.7 2.4 8.7 <4.4 >5.0 Lower leg Lat ankle 10.0 >32   Left Sural Anti Sensory (Lat Mall)  32.3°C   Calf    2.0 2.8 8.1 <4.5 >4.0 Calf Lat Mall 14.0    Right Sural Anti Sensory (Lat Mall)  32.3°C   Calf    1.2 2.3 8.3 <4.5 >4.0 Calf Lat Mall 14.0      Motor Summary Table     Stim Site NR Onset (ms) Norm Onset (ms) O-P Amp (mV) Norm O-P Amp P-T Amp (mV) Site1 Site2 Dist (cm) Chris (m/s)   Left Fibular Motor (Ext Dig Brev)  32.3°C   Ankle    4.5 <6.1 2.8 >2.5  Ankle Ext Dig Brev 8.0    B Fib    12.0  2.6   B Fib Ankle 35.0 47   Poplt    14.0  2.5   Poplt B Fib 10.0 50   Right Fibular Motor (Ext Dig Brev)  32.3°C   Ankle    4.5 <6.1 2.6 >2.5  Ankle Ext Dig Brev 8.0    B Fib    12.4  2.2   B Fib Ankle 32.0 41   Poplt    14.1  2.0   Poplt B Fib 10.0 59   Left Tibial Motor (Abd Benson Brev)  32.3°C   Ankle    4.3 <6.1 7.9 >4.4  Ankle Abd Benson Brev 8.0    Knee    13.3  5.2   Knee Ankle 36.0 40   Right Tibial Motor (Abd Benson Brev)  32.3°C   Ankle    4.3 <6.1 10.0 >4.4  Ankle Abd Benson Brev 8.0    Knee    13.3  8.2   Knee Ankle 38.0 42       EMG     Side Muscle Nerve Root Ins Act Fibs Psw Recrt Duration Amp Poly Comment   Left AntTibialis Dp Br Peron L4-5 Nml Nml Nml Nml Nml Nml Nml    Left MedGastroc Tibial S1-2 Nml Nml Nml Nml Nml Nml Nml    Left Peroneus Long   Nml Nml Nml Nml Nml Nml Nml    Left VastusLat Femoral L2-4 Nml Nml Nml Nml Nml Nml Nml    Left Semitendinosus Sciatic L5-S2 Nml Nml Nml Nml Nml Nml Nml    Right VastusLat Femoral L2-4 Nml Nml Nml Nml Nml Nml Nml    Right MedGastroc Tibial S1-2 Nml Nml Nml Nml Nml Nml Nml      Waveforms:

## 2022-05-27 ENCOUNTER — OFFICE VISIT (OUTPATIENT)
Dept: INTERNAL MEDICINE CLINIC | Age: 72
End: 2022-05-27
Payer: MEDICARE

## 2022-05-27 VITALS
WEIGHT: 148.4 LBS | BODY MASS INDEX: 24.72 KG/M2 | DIASTOLIC BLOOD PRESSURE: 83 MMHG | TEMPERATURE: 98.6 F | RESPIRATION RATE: 16 BRPM | HEART RATE: 87 BPM | OXYGEN SATURATION: 96 % | SYSTOLIC BLOOD PRESSURE: 134 MMHG | HEIGHT: 65 IN

## 2022-05-27 DIAGNOSIS — Z94.0 KIDNEY TRANSPLANT RECIPIENT: ICD-10-CM

## 2022-05-27 DIAGNOSIS — R42 EPISODIC LIGHTHEADEDNESS: ICD-10-CM

## 2022-05-27 DIAGNOSIS — F51.3 SLEEP WALKING DISORDER: ICD-10-CM

## 2022-05-27 DIAGNOSIS — F51.01 PRIMARY INSOMNIA: Primary | ICD-10-CM

## 2022-05-27 DIAGNOSIS — Z12.11 SCREEN FOR COLON CANCER: ICD-10-CM

## 2022-05-27 DIAGNOSIS — N18.31 STAGE 3A CHRONIC KIDNEY DISEASE (HCC): ICD-10-CM

## 2022-05-27 PROBLEM — N18.30 CHRONIC RENAL DISEASE, STAGE III (HCC): Status: ACTIVE | Noted: 2022-05-27

## 2022-05-27 PROCEDURE — 1101F PT FALLS ASSESS-DOCD LE1/YR: CPT | Performed by: INTERNAL MEDICINE

## 2022-05-27 PROCEDURE — G9899 SCRN MAM PERF RSLTS DOC: HCPCS | Performed by: INTERNAL MEDICINE

## 2022-05-27 PROCEDURE — G8420 CALC BMI NORM PARAMETERS: HCPCS | Performed by: INTERNAL MEDICINE

## 2022-05-27 PROCEDURE — 3017F COLORECTAL CA SCREEN DOC REV: CPT | Performed by: INTERNAL MEDICINE

## 2022-05-27 PROCEDURE — 1090F PRES/ABSN URINE INCON ASSESS: CPT | Performed by: INTERNAL MEDICINE

## 2022-05-27 PROCEDURE — 99214 OFFICE O/P EST MOD 30 MIN: CPT | Performed by: INTERNAL MEDICINE

## 2022-05-27 PROCEDURE — G8536 NO DOC ELDER MAL SCRN: HCPCS | Performed by: INTERNAL MEDICINE

## 2022-05-27 PROCEDURE — G8432 DEP SCR NOT DOC, RNG: HCPCS | Performed by: INTERNAL MEDICINE

## 2022-05-27 PROCEDURE — G8427 DOCREV CUR MEDS BY ELIG CLIN: HCPCS | Performed by: INTERNAL MEDICINE

## 2022-05-27 PROCEDURE — G8399 PT W/DXA RESULTS DOCUMENT: HCPCS | Performed by: INTERNAL MEDICINE

## 2022-05-27 RX ORDER — DARIDOREXANT 50 MG/1
50 TABLET, FILM COATED ORAL
Qty: 90 TABLET | Refills: 3 | Status: SHIPPED | OUTPATIENT
Start: 2022-05-27 | End: 2022-08-09

## 2022-05-27 NOTE — PROGRESS NOTES
Thomas Tamez is a 74 y.o. female who presents for evaluation of routine follow up for insomnia. Last seen by me march 21, 2022 for lightheadedness, which has basically resolved. Still gets a spell on occasion when she gets up from seated position too quickly. Since march, she has had about 6 episodes of sleep walking. Daughter has still been awake each episode, and has been eventually able to get her back to bed. Katheran Jester has been the only rx that has helped her sleep. They have cut back on dose to 1/2 pill, and now her sleep is not as good, and she has still had some sleep walking spells. They live on single floor, so no stairs to worry about. Have also added another lock on door. Had emg/ncs done yesterday with dr Marcella Jimenez, was normal.  Daughter with her today.       ROS:  Constitutional: negative for fevers, chills, anorexia and weight loss  Eyes:   negative for visual disturbance and irritation  ENT:   negative for tinnitus,sore throat,nasal congestion,ear pain,hoarseness  Respiratory:  negative for cough, hemoptysis, dyspnea,wheezing  CV:   negative for chest pain, palpitations, lower extremity edema  GI:   negative for nausea, vomiting, diarrhea, abdominal pain,melena  Genitourinary: negative for frequency, dysuria and hematuria  Musculoskel: negative for myalgias, arthralgias, back pain, muscle weakness, joint pain  Neurological:  negative for headaches, dizziness, focal weakness, numbness  Psychiatric:     Negative for depression or anxiety      Past Medical History:   Diagnosis Date    Cancer ([facility] [address]     throat    Chronic kidney disease        Past Surgical History:   Procedure Laterality Date    HX GYN      hysterectomy     HX TRANSPLANT  04/03/1997    kidney       Family History   Problem Relation Age of Onset    Diabetes Mother     No Known Problems Father        Social History     Socioeconomic History    Marital status:      Spouse name: Not on file    Number of children: Not on file    Years of education: Not on file    Highest education level: Not on file   Occupational History    Not on file   Tobacco Use    Smoking status: Never Smoker    Smokeless tobacco: Never Used   Substance and Sexual Activity    Alcohol use: Not Currently    Drug use: Never    Sexual activity: Not Currently   Other Topics Concern    Not on file   Social History Narrative    Not on file     Social Determinants of Health     Financial Resource Strain:     Difficulty of Paying Living Expenses: Not on file   Food Insecurity:     Worried About Running Out of Food in the Last Year: Not on file    Bonnie of Food in the Last Year: Not on file   Transportation Needs:     Lack of Transportation (Medical): Not on file    Lack of Transportation (Non-Medical):  Not on file   Physical Activity:     Days of Exercise per Week: Not on file    Minutes of Exercise per Session: Not on file   Stress:     Feeling of Stress : Not on file   Social Connections:     Frequency of Communication with Friends and Family: Not on file    Frequency of Social Gatherings with Friends and Family: Not on file    Attends Oriental orthodox Services: Not on file    Active Member of [address] [place] [address] or Organizations: Not on file    Attends Club or Organization Meetings: Not on file    Marital Status: Not on file   Intimate Partner Violence:     Fear of Current or Ex-Partner: Not on file    Emotionally Abused: Not on file    Physically Abused: Not on file    Sexually Abused: Not on file   Housing Stability:     Unable to Pay for Housing in the Last Year: Not on file    Number of [place] in the Last Year: Not on file    Unstable Housing in the Last Year: Not on file            Visit Vitals  /83 (BP 1 Location: Left upper arm, BP Patient Position: Sitting)   Pulse 87   Temp 98.6 °F (37 °C) (Temporal)   Resp 16   Ht 5' 5\" (1.651 m)   Wt 148 lb 6.4 oz (67.3 kg)   SpO2 96%   BMI 24.70 kg/m²       Physical Examination:   General - Well appearing female  HEENT - PERRL, TM no erythema/opacification, normal nasal turbinates, no oropharyngeal erythema or exudate, MMM  Neck - supple, no bruits, no thyroidomegaly, no lymphadenopathy  Pulm - clear to auscultation bilaterally  Cardio - RRR, normal S1 S2, no murmur  Abd - soft, nontender, no masses, no HSM  Extrem - no edema, +2 distal pulses  Neuro-  No focal deficits, CN intact     Assessment/Plan:    1. Insomnia, now with sleep walking--rx sent in to start quviviq. Need to stop ambien. 2.  Screen colon cancer--cologuard ordered  3. Lightheadedness, near syncope--has improved. Does not get up as quickly any longer. Also using cane now.   4.  Hx kidney transplant recipient--continue prednisone, cyclosporine, bicarbonate    rtc 3 months for awv        Danya Coates III, DO

## 2022-05-27 NOTE — PROGRESS NOTES
1. \"Have you been to the ER, urgent care clinic since your last visit? Hospitalized since your last visit? \" no    2. \"Have you seen or consulted any other health care providers outside of the [address] [place] [address] since your last visit? \"  no    3. For patients aged 39-70: Has the patient had a colonoscopy / FIT/ Cologuard? no      If the patient is female:    4. For patients aged 41-77: Has the patient had a mammogram within the past 2 years?  yes

## 2022-05-27 NOTE — PATIENT INSTRUCTIONS
Insomnia: Care Instructions  Overview     Insomnia is the inability to sleep well. Insomnia may make it hard for you to get to sleep, stay asleep, or sleep as long as you need to. This can make you tired and grouchy during the day. It can also make you forgetful, less effective at work, and unhappy. Insomnia can be linked to many things. These include health problems, medicines, and stressful events. Treatment may include treating problems that may be linked with your insomnia. Treatment also includes behavior and lifestyle changes. This may include cognitive-behavioral therapy for insomnia (CBT-I). CBT-I uses different ways to help you change your thoughts and behaviors that may interfere with sleep. Your doctor can recommend specific things you can try. Examples include doing relaxation exercises, keeping regular bedtimes and wake times, limiting alcohol, and making healthy sleep habits. Some people decide to take medicine for a while to help with sleep. Follow-up care is a key part of your treatment and safety. Be sure to make and go to all appointments, and call your doctor if you are having problems. It's also a good idea to know your test results and keep a list of the medicines you take. How can you care for yourself at home? Cognitive-behavioral therapy for insomnia (CBT-I)  · If your doctor recommends CBT-I, follow your treatment plan. Your doctor will give you instructions that are unique for you. · Your plan will likely include a few things that you can try at home. For example:  ? Try meditation or other relaxation techniques before you go to bed. ? Go to bed at the same time every night, and wake up at the same time every morning. Do not take naps during the day. ? Do not stay in bed awake for too long.  If you can't fall asleep, or if you wake up in the middle of the night and can't get back to sleep within about 15 to 20 minutes, get out of bed and go to another room until you feel sleepy. ? If watching the clock makes you anxious, turn it facing away from you so you cannot see the time. ? If you worry when you lie down, start a worry book. Well before bedtime, write down your worries, and then set the book and your concerns aside. Healthy sleep habits  · If your doctor recommends it, try making healthy sleep habits. For example:  ? Keep your bedroom quiet, dark, and cool. ? Do not have drinks with caffeine, such as coffee or black tea, for 8 hours before bed. ? Do not smoke or use other types of tobacco near bedtime. Nicotine is a stimulant and can keep you awake. ? Avoid drinking alcohol late in the evening, because it can cause you to wake in the middle of the night. ? Do not eat a big meal close to bedtime. If you are hungry, eat a light snack. ? Do not drink a lot of water close to bedtime, because the need to urinate may wake you up during the night. ? Do not read, watch TV, or use your phone in bed. Use the bed only for sleeping and sex. Medicine  · Be safe with medicines. Take your medicines exactly as prescribed. Call your doctor if you think you are having a problem with your medicine. · Talk with your doctor before you try an over-the-counter medicine, herbal product, or supplement to try to improve your sleep. Your doctor can recommend how much to take and when to take it. Make sure your doctor knows all of the medicines, vitamins, herbal products, and supplements you take. · You will get more details on the specific medicines your doctor prescribes. When should you call for help? Watch closely for changes in your health, and be sure to contact your doctor if:    · Your efforts to improve your sleep do not work.     · Your insomnia gets worse.     · You have been feeling down, depressed, or hopeless or have lost interest in things that you usually enjoy. Where can you learn more?   Go to http://erin-demetria.info/  Enter P513 in the search box to learn more about \"Insomnia: Care Instructions. \"  Current as of: June 16, 2021               Content Version: 13.2  © 1982-5405 [employer]. Care instructions adapted under license by [employer] (which disclaims liability or warranty for this information). If you have questions about a medical condition or this instruction, always ask your healthcare professional. [address] any warranty or liability for your use of this information. Learning About Sleeping Well  What does sleeping well mean? Sleeping well means getting enough sleep to feel good and stay healthy. How much sleep is enough varies among people. The number of hours you sleep and how you feel when you wake up are both important. If you do not feel refreshed, you probably need more sleep. Another sign of not getting enough sleep is feeling tired during the day. Experts recommend that adults get at least 7 or more hours of sleep per day. Children and older adults need more sleep. Why is getting enough sleep important? Getting enough quality sleep is a basic part of good health. When your sleep suffers, your physical health, mood, and your thoughts can suffer too. You may find yourself feeling more grumpy or stressed. Not getting enough sleep also can lead to serious problems, including injury, accidents, anxiety, and depression. What might cause poor sleeping? Many things can cause sleep problems, including:  · Changes to your sleep schedule. · Stress. Stress can be caused by fear about a single event, such as giving a speech. Or you may have ongoing stress, such as worry about work or school. · Depression, anxiety, and other mental or emotional conditions. · Changes in your sleep habits or surroundings. This includes changes that happen where you sleep, such as noise, light, or sleeping in a different bed.  It also includes changes in your sleep pattern, such as having jet lag or working a late shift. · Health problems, such as pain, breathing problems, and restless legs syndrome. · Lack of regular exercise. · Using alcohol, nicotine, or caffeine before bed. How can you help yourself? Here are some tips that may help you sleep more soundly and wake up feeling more refreshed. Your sleeping area   · Use your bedroom only for sleeping and sex. A bit of light reading may help you fall asleep. But if it doesn't, do your reading elsewhere in the house. Try not to use your TV, computer, smartphone, or tablet while you are in bed. · Be sure your bed is big enough to stretch out comfortably, especially if you have a sleep partner. · Keep your bedroom quiet, dark, and cool. Use curtains, blinds, or a sleep mask to block out light. To block out noise, use earplugs, soothing music, or a \"white noise\" machine. Your evening and bedtime routine   · Create a relaxing bedtime routine. You might want to take a warm shower or bath, or listen to soothing music. · Go to bed at the same time every night. And get up at the same time every morning, even if you feel tired. What to avoid   · Limit caffeine (coffee, tea, caffeinated sodas) during the day, and don't have any for at least 6 hours before bedtime. · Avoid drinking alcohol before bedtime. Alcohol can cause you to wake up more often during the night. · Try not to smoke or use tobacco, especially in the evening. Nicotine can keep you awake. · Limit naps during the day, especially close to bedtime. · Avoid lying in bed awake for too long. If you can't fall asleep or if you wake up in the middle of the night and can't get back to sleep within about 20 minutes, get out of bed and go to another room until you feel sleepy. · Avoid taking medicine right before bed that may keep you awake or make you feel hyper or energized. Your doctor can tell you if your medicine may do this and if you can take it earlier in the day.   If you can't sleep   · Imagine yourself in a peaceful, pleasant scene. Focus on the details and feelings of being in a place that is relaxing. · Get up and do a quiet or boring activity until you feel sleepy. · Avoid drinking any liquids before going to bed to help prevent waking up often to use the bathroom. Where can you learn more? Go to http://www.gray.com/  Enter A881 in the search box to learn more about \"Learning About Sleeping Well. \"  Current as of: June 16, 2021               Content Version: 13.2  © 8685-7932 [employer], [employer]. Care instructions adapted under license by [employer] (which disclaims liability or warranty for this information). If you have questions about a medical condition or this instruction, always ask your healthcare professional. [address] any warranty or liability for your use of this information.

## 2022-06-02 ENCOUNTER — TELEPHONE (OUTPATIENT)
Dept: INTERNAL MEDICINE CLINIC | Age: 72
End: 2022-06-02

## 2022-06-20 DIAGNOSIS — G47.00 INSOMNIA, UNSPECIFIED TYPE: Primary | ICD-10-CM

## 2022-06-20 RX ORDER — TEMAZEPAM 7.5 MG/1
7.5 CAPSULE ORAL
Qty: 90 CAPSULE | Refills: 3 | Status: SHIPPED | OUTPATIENT
Start: 2022-06-20 | End: 2022-06-23 | Stop reason: ALTCHOICE

## 2022-06-23 ENCOUNTER — OFFICE VISIT (OUTPATIENT)
Dept: NEUROLOGY | Age: 72
End: 2022-06-23
Payer: MEDICARE

## 2022-06-23 VITALS
OXYGEN SATURATION: 95 % | HEIGHT: 65 IN | BODY MASS INDEX: 24.22 KG/M2 | RESPIRATION RATE: 16 BRPM | SYSTOLIC BLOOD PRESSURE: 129 MMHG | DIASTOLIC BLOOD PRESSURE: 89 MMHG | WEIGHT: 145.4 LBS | HEART RATE: 84 BPM | TEMPERATURE: 96.7 F

## 2022-06-23 DIAGNOSIS — R42 DIZZINESS: ICD-10-CM

## 2022-06-23 DIAGNOSIS — R90.82 WHITE MATTER DISEASE: ICD-10-CM

## 2022-06-23 DIAGNOSIS — E53.8 VITAMIN B 12 DEFICIENCY: Primary | ICD-10-CM

## 2022-06-23 DIAGNOSIS — G57.93 NEUROPATHIC PAIN OF BOTH FEET: ICD-10-CM

## 2022-06-23 DIAGNOSIS — R41.3 MEMORY LOSS: ICD-10-CM

## 2022-06-23 PROCEDURE — 1123F ACP DISCUSS/DSCN MKR DOCD: CPT | Performed by: NURSE PRACTITIONER

## 2022-06-23 PROCEDURE — 99214 OFFICE O/P EST MOD 30 MIN: CPT | Performed by: NURSE PRACTITIONER

## 2022-06-23 NOTE — PROGRESS NOTES
Chief Complaint   Patient presents with    Follow-up     Carotids    Memory Loss     1. Have you been to the ER, urgent care clinic since your last visit? No  Hospitalized since your last visit? No  2. Have you seen or consulted any other health care providers outside of the [address] [place] [address] since your last visit? NO Include any pap smears or colon screening. No     Patient C/O memory issues early in morning will improve with resting or nap. Not sure of medications will need to check with daughter.

## 2022-06-23 NOTE — PROGRESS NOTES
[facility]  [address] [facility] [address] [place] [address]  James Blair  Tel: [phone]  Fax: [phone]      Date:  22     Name:  Cassandra Estevez  :  1950  MRN:  [id number]     PCP:  Abdullahi Jama DO    Chief Complaint   Patient presents with    Follow-up     Carotids    Memory Loss       HISTORY OF PRESENT ILLNESS:  Patient presents today for  Recheck and review of all testing. She notes she has been dizzy, but it's better. Still a little dizzy in the morning, no falls. Patient is aware that this is when she experiences the dizziness most, so she is cautious. Her biggest issue is that she has been having trouble sleeping, new med started yesterday, by her primary care, temazepam.  Ambien worked great but she was sleepwalking with it. Over the last few months she has been losing weight nearly 50 pounds, overall she is feeling better with that weight loss. Patient did purchase a cane, she does not use it routinely. Brain MRI completed noted some progression of nonspecific white matter changes, moderate amount. Carotid Doppler showed minimal less than 15% stenosis. Vitamin B12 was found to be low. EMG/NCS: This study was normal.  There was no electrodiagnostic evidence upon todays examination suggesting a focal or generalized large fiber neuropathy, myopathy, or radiculopathy. This study cannot exclude a small fiber neuropathy. Clinical correlation is recommended. REVIEW OF SYSTEMS:     Review of Systems   Eyes: Positive for blurred vision (eyes are little fuzzy, wearing glasses help). Negative for double vision. Gastrointestinal: Negative. Genitourinary: Negative. Musculoskeletal: Negative. Negative for falls. Has a cane    Neurological: Positive for dizziness and tingling (B feet at times). Negative for tremors, sensory change, seizures, loss of consciousness, weakness and headaches.    Psychiatric/Behavioral: Positive for memory loss (forgets things quickly, can't remember what happened 2-3 days ago. ). Negative for depression. The patient has insomnia. The patient is not nervous/anxious. All other systems reviewed and are negative. Current Outpatient Medications   Medication Sig    predniSONE (DELTASONE) 5 mg tablet TAKE 1 TABLET BY MOUTH ONCE DAILY    cycloSPORINE (SandIMMUNE) 25 mg capsule Take 3 Capsules by mouth two (2) times a day. Dx code: Z94.0    ondansetron (ZOFRAN ODT) 4 mg disintegrating tablet Take 1 Tablet by mouth every eight (8) hours as needed for Nausea or Vomiting.  sodium bicarbonate 650 mg tablet TAKE 1 TABLET BY MOUTH FOUR TIMES A DAY    calcium-cholecalciferol, d3, (CALCIUM 600 + D) 600-125 mg-unit tab Take 600 mg by mouth two (2) times a day.  daridorexant (Quviviq) 50 mg tab Take 50 mg by mouth nightly. Max Daily Amount: 50 mg. No current facility-administered medications for this visit.      Allergies   Allergen Reactions    Adhesive Rash    Codeine Vertigo    Gabapentin Vertigo     syncope     Past Medical History:   Diagnosis Date    Cancer ([address]     throat    Chronic kidney disease     Memory loss 06/2022     Past Surgical History:   Procedure Laterality Date    HX GYN      hysterectomy     HX TRANSPLANT  04/03/1997    kidney     Social History     Socioeconomic History    Marital status:      Spouse name: Not on file    Number of children: Not on file    Years of education: Not on file    Highest education level: Not on file   Occupational History    Not on file   Tobacco Use    Smoking status: Never Smoker    Smokeless tobacco: Never Used   Vaping Use    Vaping Use: Never used   Substance and Sexual Activity    Alcohol use: Not Currently    Drug use: Never    Sexual activity: Not Currently   Other Topics Concern    Not on file   Social History Narrative    Not on file     Social Determinants of Health     Financial Resource Strain:     Difficulty of Paying Living Expenses: Not on file   Food Insecurity:     Worried About Running Out of Food in the Last Year: Not on file    Ran Out of Food in the Last Year: Not on file   Transportation Needs:     Lack of Transportation (Medical): Not on file    Lack of Transportation (Non-Medical): Not on file   Physical Activity:     Days of Exercise per Week: Not on file    Minutes of Exercise per Session: Not on file   Stress:     Feeling of Stress : Not on file   Social Connections:     Frequency of Communication with Friends and Family: Not on file    Frequency of Social Gatherings with Friends and Family: Not on file    Attends Hinduism Services: Not on file    Active Member of [address] [place] [address] [employer] or Organizations: Not on file    Attends Club or Organization Meetings: Not on file    Marital Status: Not on file   Intimate Partner Violence:     Fear of Current or Ex-Partner: Not on file    Emotionally Abused: Not on file    Physically Abused: Not on file    Sexually Abused: Not on file   Housing Stability:     Unable to Pay for Housing in the Last Year: Not on file    Number of [place] in the Last Year: Not on file    Unstable Housing in the Last Year: Not on file     Family History   Problem Relation Age of Onset    Diabetes Mother     No Known Problems Father      EXAM: MRI BRAIN WO CONT     INDICATION: Memory loss     COMPARISON: March 2020.     CONTRAST: None.     TECHNIQUE:    Multiplanar multisequence acquisition without contrast of the brain.     FINDINGS:  Diffusion imaging does not show acute ischemic changes. There is no extra-axial fluid collection hemorrhage or shift. A partially empty sella is unchanged. moderate nonspecific white matter disease once again demonstrated. Slight  progression. Flow voids in major vessels at the base of the brain are present. No mass. Incidental day remain complete opacification of the left maxillary sinus, and  bilateral mastoid air cell disease.  Improvement in the frontal sinus disease,  mild ethmoid sinusitis.     IMPRESSION  1. No acute findings, no mass. 2. Slight progression of nonspecific white matter changes. 3. Some improvement in diffuse paranasal sinus and mastoid air cell disease. PHYSICAL EXAMINATION:    Visit Vitals  /89 (BP 1 Location: Right lower arm, BP Patient Position: Sitting, BP Cuff Size: Adult)   Pulse 84   Temp (!) 96.7 °F (35.9 °C) (Temporal)   Resp 16   Ht 5' 5\" (1.651 m)   Wt 145 lb 6.4 oz (66 kg)   SpO2 95%   BMI 24.20 kg/m²     General:  Well defined, nourished, and well groomed individual in no acute distress. Musculoskeletal:  Extremities revealed no edema and had full range of motion of joints. Psych:  Good mood and bright affect, with her daughter. NEUROLOGICAL EXAMINATION:     Mental Status:   Alert and oriented to person, place, and time with recent and remote memory intact. Attention span and concentration are normal. Clear speech. Fund of knowledge preserved. Patient did have difficulty in the naming of our president. Cranial Nerves:   PERRLA. Visual fields were full  EOM: no evidence of nystagmus  Facial sensation:  normal and symmetric  Facial motor: normal and symmetric, no facial droop noted. Hearing intact  SCM strength intact  Tongue: midline without fasciculations    Motor Examination: Normal tone. 5/5 muscle strength in bilateral upper and lower extremities. No cogwheel rigidity. No muscle wasting, no twitching or fasciculation noted. Sensory exam:  Normal throughout to light touch in bilateral upper and lower extremities. Coordination:   Finger to nose and rapid arm movement testing was normal.  No resting or intention tremor. Negative Romberg, negative pronator drift. Gait and Station:  Steady gait, difficulty with tandem walking. Normal arm swing. Reflexes:  DTRs 2+ in bilateral biceps, brachioradialis, patella and ankle. No clonus noted.     ASSESSMENT AND PLAN      ICD-10-CM ICD-9-CM    1. Vitamin B 12 deficiency  E53.8 266.2    2. Dizziness  R42 780.4    3. Memory loss  R41.3 780.93    4. White matter disease  R90.82 348.89    5. Neuropathic pain of both feet  G57.93 356.9      1. Vitamin B 12 deficiency: Continue with additional vitamin B12 supplementation, will recheck labs periodically or defer to primary care. 2. Dizziness: Patient notes improvements at this time, discussed autonomic nervous testing and/or referral to cardiology. She will defer at this time but will call if she would like to pursue. Patient did purchase a cane she can use to when ambulating. 3. Memory loss: MRI reviewed with patient and her daughter, they will defer adding any medications or modifying her plans of care at this time but will notify us of any worsening. Daughter does assist with medication administration, pillboxes, etc.  4. White matter disease: Brain MRI reviewed with patient and daughter, will continue to monitor as there was some progression from 2020.  5. Neuropathic pain of both feet: Nerve conduction/EMG reviewed with patient which was normal, patient notes is not problematic at this time therefore we will defer further management. She is to continue to monitor signs and symptoms, fall precautions recommended. Healthy lifestyle encouraged. Patient and/or family verbalized understand of all instructions and all questions/concerns were addressed. Safety/side effects of medications discussed. Patient already has an appointment scheduled with Dr Maritza Mar in December, she is to hold onto that appointment. She is to consider some of the additional testing will call if she would like to pursue it.       Kamar Reynoso, CINTHYA-BC

## 2022-07-04 RX ORDER — SODIUM BICARBONATE 650 MG/1
TABLET ORAL
Qty: 360 TABLET | Refills: 3 | Status: SHIPPED | OUTPATIENT
Start: 2022-07-04

## 2022-07-11 ENCOUNTER — TELEPHONE (OUTPATIENT)
Dept: INTERNAL MEDICINE CLINIC | Age: 72
End: 2022-07-11

## 2022-07-11 NOTE — TELEPHONE ENCOUNTER
Regarding : temazepam (RESTORIL) 7.5 mg capsule      States patient needs stronger dose. States 7.5 is not working for pt.     Can anything be done to adjust.      [phone]            Confirmed:   [id number] [place], [address] AT [address] [place] [address]  [phone]

## 2022-07-11 NOTE — TELEPHONE ENCOUNTER
Called patient. ID verified with Name and . Spoke with patient's daughter in regards to message received. Daughter states that patient was prescribed Temazepam 7.5 mg for sleep a few weeks ago. Daughter states that she feels medication has not been as effective as she would like. Daughter would like to know if dosage could be increased at this time.

## 2022-07-12 NOTE — TELEPHONE ENCOUNTER
Called, spoke with Blossom Miller (HIPAA). Two pt identifiers confirmed. Carlos Manuel informed per Dr. Amanda Christensen, pt needs to stay on the same regimen of Glenn Learn stated ok. Carlos Manuel verbalized understanding of information discussed w/ no further questions at this time.

## 2022-07-18 DIAGNOSIS — G47.00 INSOMNIA, UNSPECIFIED TYPE: ICD-10-CM

## 2022-07-18 RX ORDER — ZOLPIDEM TARTRATE 5 MG/1
TABLET ORAL
Qty: 30 TABLET | Refills: 1 | Status: SHIPPED | OUTPATIENT
Start: 2022-07-18 | End: 2022-08-09 | Stop reason: SDUPTHER

## 2022-07-18 NOTE — TELEPHONE ENCOUNTER
Daughter, [id number] states that the current medication to help with sleep is too expensive. It is not covered under insurance and they would like pt to have the Ambien again. Can this be called in as soon as you can?

## 2022-08-09 ENCOUNTER — OFFICE VISIT (OUTPATIENT)
Dept: INTERNAL MEDICINE CLINIC | Age: 72
End: 2022-08-09
Payer: MEDICARE

## 2022-08-09 VITALS
HEART RATE: 84 BPM | SYSTOLIC BLOOD PRESSURE: 128 MMHG | BODY MASS INDEX: 24.76 KG/M2 | DIASTOLIC BLOOD PRESSURE: 84 MMHG | WEIGHT: 148.6 LBS | TEMPERATURE: 97.8 F | HEIGHT: 65 IN | RESPIRATION RATE: 16 BRPM

## 2022-08-09 DIAGNOSIS — M81.0 POSTMENOPAUSAL BONE LOSS: ICD-10-CM

## 2022-08-09 DIAGNOSIS — Z00.00 MEDICARE ANNUAL WELLNESS VISIT, SUBSEQUENT: Primary | ICD-10-CM

## 2022-08-09 DIAGNOSIS — Z94.0 KIDNEY TRANSPLANT RECIPIENT: ICD-10-CM

## 2022-08-09 DIAGNOSIS — Z12.11 SCREEN FOR COLON CANCER: ICD-10-CM

## 2022-08-09 DIAGNOSIS — G47.00 INSOMNIA, UNSPECIFIED TYPE: ICD-10-CM

## 2022-08-09 DIAGNOSIS — N18.31 STAGE 3A CHRONIC KIDNEY DISEASE (HCC): ICD-10-CM

## 2022-08-09 PROCEDURE — G0439 PPPS, SUBSEQ VISIT: HCPCS | Performed by: INTERNAL MEDICINE

## 2022-08-09 PROCEDURE — 3017F COLORECTAL CA SCREEN DOC REV: CPT | Performed by: INTERNAL MEDICINE

## 2022-08-09 PROCEDURE — 1101F PT FALLS ASSESS-DOCD LE1/YR: CPT | Performed by: INTERNAL MEDICINE

## 2022-08-09 PROCEDURE — G8536 NO DOC ELDER MAL SCRN: HCPCS | Performed by: INTERNAL MEDICINE

## 2022-08-09 PROCEDURE — G9899 SCRN MAM PERF RSLTS DOC: HCPCS | Performed by: INTERNAL MEDICINE

## 2022-08-09 PROCEDURE — G8427 DOCREV CUR MEDS BY ELIG CLIN: HCPCS | Performed by: INTERNAL MEDICINE

## 2022-08-09 PROCEDURE — G8420 CALC BMI NORM PARAMETERS: HCPCS | Performed by: INTERNAL MEDICINE

## 2022-08-09 PROCEDURE — G8510 SCR DEP NEG, NO PLAN REQD: HCPCS | Performed by: INTERNAL MEDICINE

## 2022-08-09 RX ORDER — PREDNISONE 5 MG/1
TABLET ORAL
Qty: 90 TABLET | Refills: 3 | Status: SHIPPED | OUTPATIENT
Start: 2022-08-09

## 2022-08-09 RX ORDER — ZOLPIDEM TARTRATE 5 MG/1
TABLET ORAL
Qty: 30 TABLET | Refills: 5 | Status: SHIPPED | OUTPATIENT
Start: 2022-08-09

## 2022-08-09 NOTE — PROGRESS NOTES
Daxa White is a 74 y.o. female who presents for evaluation of AWV. Last seen by me may 27, 2022 for insomnia. Rx given then for quviviq, which she tried for a month, did not find it very useful, and was $105 per month, so she did not refill it again. Went back to [facility], and is sleeping well again, though on occasion still has some sleep walking spells. Overall feel well, has no new concerns. Daughter with her today. They are going to meet new misbah later today. He is 7 weeks old now.       ROS:  Constitutional: negative for fevers, chills, anorexia and weight loss  Eyes:   negative for visual disturbance and irritation  ENT:   negative for tinnitus,sore throat,nasal congestion,ear pain,hoarseness  Respiratory:  negative for cough, hemoptysis, dyspnea,wheezing  CV:   negative for chest pain, palpitations, lower extremity edema  GI:   negative for nausea, vomiting, diarrhea, abdominal pain,melena  Genitourinary: negative for frequency, dysuria and hematuria  Musculoskel: negative for myalgias, arthralgias, back pain, muscle weakness, joint pain  Neurological:  negative for headaches, dizziness, focal weakness, numbness  Psychiatric:     Negative for depression or anxiety      Past Medical History:   Diagnosis Date    Cancer ([address]     throat    Chronic kidney disease     Memory loss 06/2022       Past Surgical History:   Procedure Laterality Date    HX GYN      hysterectomy     HX TRANSPLANT  04/03/1997    kidney       Family History   Problem Relation Age of Onset    Diabetes Mother     No Known Problems Father        Social History     Socioeconomic History    Marital status:      Spouse name: Not on file    Number of children: Not on file    Years of education: Not on file    Highest education level: Not on file   Occupational History    Not on file   Tobacco Use    Smoking status: Never    Smokeless tobacco: Never   Vaping Use    Vaping Use: Never used   Substance and Sexual Activity Alcohol use: Not Currently    Drug use: Never    Sexual activity: Not Currently   Other Topics Concern    Not on file   Social History Narrative    Not on file     Social Determinants of Health     Financial Resource Strain: Not on file   Food Insecurity: Not on file   Transportation Needs: Not on file   Physical Activity: Not on file   Stress: Not on file   Social Connections: Not on file   Intimate Partner Violence: Not on file   Housing Stability: Not on file          Visit Vitals  /84 (BP 1 Location: Right arm, BP Patient Position: Sitting, BP Cuff Size: Adult)   Pulse 84   Temp 97.8 °F (36.6 °C) (Temporal)   Resp 16   Ht 5' 5\" (1.651 m)   Wt 148 lb 9.6 oz (67.4 kg)   BMI 24.73 kg/m²       Physical Examination:   General - Well appearing female  HEENT - PERRL, TM no erythema/opacification, normal nasal turbinates, no oropharyngeal erythema or exudate, MMM  Neck - supple, no bruits, no thyroidomegaly, no lymphadenopathy  Pulm - clear to auscultation bilaterally  Cardio - RRR, normal S1 S2, no murmur  Abd - soft, nontender, no masses, no HSM  Extrem - no edema, +2 distal pulses  Neuro-  No focal deficits, CN intact     Assessment/Plan:     Insomnia--refills given for ambien  Hx kidney transplant recipient--continue prednisone, cyclosporin  Ckd 3--on sodium bicarbonate  Vit d def--on replacement  Post menopausal bone loss--dexa scan ordered  Screen colon cancer--has done 2 cologuards since last visit, first episode gave too much sample, next time sent it back to wrong address. Rtc 6 months        Lamtalat Peer III, DO              This is the Subsequent Medicare Annual Wellness Exam, performed 12 months or more after the Initial AWV or the last Subsequent AWV    I have reviewed the patient's medical history in detail and updated the computerized patient record.        Assessment/Plan   Education and counseling provided:  Are appropriate based on today's review and evaluation  End-of-Life planning (with patient's consent)  Pneumococcal Vaccine  Influenza Vaccine  Screening Mammography  Colorectal cancer screening tests  Bone mass measurement (DEXA)    1. Medicare annual wellness visit, subsequent  2. Insomnia, unspecified type  The following orders have not been finalized:  -     zolpidem (AMBIEN) 5 mg tablet       Depression Risk Factor Screening     3 most recent PHQ Screens 8/9/2022   Little interest or pleasure in doing things Not at all   Feeling down, depressed, irritable, or hopeless Not at all   Total Score PHQ 2 0       Alcohol & Drug Abuse Risk Screen    Do you average more than 1 drink per night or more than 7 drinks a week:  No    On any one occasion in the past three months have you have had more than 3 drinks containing alcohol:  No          Functional Ability and Level of Safety    Hearing: Hearing is good. Activities of Daily Living: The home contains: no safety equipment. Patient does total self care      Ambulation: with no difficulty. Has a cane, but does not need to use it yet. Fall Risk:  Fall Risk Assessment, last 12 mths 8/9/2022   Able to walk? Yes   Fall in past 12 months? 1   Do you feel unsteady? 0   Are you worried about falling 0   Number of falls in past 12 months 1   Fall with injury?  0      Abuse Screen:  Patient is not abused       Cognitive Screening    Has your family/caregiver stated any concerns about your memory: no     Cognitive Screening: Normal - MMSE (Mini Mental Status Exam)    Health Maintenance Due     Health Maintenance Due   Topic Date Due    Shingrix Vaccine Age 49> (1 of 2) Never done    DTaP/Tdap/Td series (1 - Tdap) Never done    Colorectal Cancer Screening Combo  04/02/2022       Patient Care Team   Patient Care Team:  Riccardo Messina DO as PCP - General (Internal Medicine Physician)  Riccardo Messina DO as PCP - [facility] Empaneled Provider  Asa Brian MD (Colon and Rectal Surgery)  Carol Taylor MD (Nephrology)  Bernarda Landau, MD (Nephrology)  Angela Gonzalez NP as Nurse Practitioner (Nurse Practitioner)    History     Patient Active Problem List   Diagnosis Code    Obesity (BMI 30-39. 9) E66.9    Other insomnia G47.09    Rheumatoid arthritis (HCC) M06.9    NHL (non-Hodgkin's lymphoma) ([facility]) C85.90    Kidney transplant recipient Z94.0    Chronic renal disease, stage III N18.30     Past Medical History:   Diagnosis Date    Cancer ([facility]     throat    Chronic kidney disease     Memory loss 06/2022      Past Surgical History:   Procedure Laterality Date    HX GYN      hysterectomy     HX TRANSPLANT  04/03/1997    kidney     Current Outpatient Medications   Medication Sig Dispense Refill    zolpidem (AMBIEN) 5 mg tablet TAKE 1 TABLET BY MOUTH EVERY DAY NIGHTLY FOR SLEEP 30 Tablet 1    sodium bicarbonate 650 mg tablet TAKE 1 TABLET BY MOUTH FOUR TIMES A  Tablet 3    predniSONE (DELTASONE) 5 mg tablet TAKE 1 TABLET BY MOUTH ONCE DAILY 90 Tablet 3    cycloSPORINE (SandIMMUNE) 25 mg capsule Take 3 Capsules by mouth two (2) times a day. Dx code: Z94.0 540 Capsule 3    ondansetron (ZOFRAN ODT) 4 mg disintegrating tablet Take 1 Tablet by mouth every eight (8) hours as needed for Nausea or Vomiting. 30 Tablet 0    calcium-cholecalciferol, d3, 600-125 mg-unit tab Take 600 mg by mouth two (2) times a day. daridorexant (Quviviq) 50 mg tab Take 50 mg by mouth nightly. Max Daily Amount: 50 mg.  (Patient not taking: Reported on 8/9/2022) 90 Tablet 3     Allergies   Allergen Reactions    Adhesive Rash    Codeine Vertigo    Gabapentin Vertigo     syncope       Family History   Problem Relation Age of Onset    Diabetes Mother     No Known Problems Father      Social History     Tobacco Use    Smoking status: Never    Smokeless tobacco: Never   Substance Use Topics    Alcohol use: Not Currently         Corso III, DO

## 2022-08-09 NOTE — PATIENT INSTRUCTIONS

## 2022-10-24 ENCOUNTER — TELEPHONE (OUTPATIENT)
Dept: INTERNAL MEDICINE CLINIC | Age: 72
End: 2022-10-24

## 2022-10-24 NOTE — TELEPHONE ENCOUNTER
**TRIAGE**    Received call from patient daughter, Lala Watts (HIPAA)  Two pt identifiers confirmed. Complaint:Intermittent dizziness some episodes last longer than others, Headaches and fatigue  No other symptoms.   Home COVID test negative - done 10/21/22   Is using Ambien as ordered      Plan: Offered and accepted appt tomorrow at 10:20 am with Dr. Kayden Rosas

## 2022-10-26 ENCOUNTER — OFFICE VISIT (OUTPATIENT)
Dept: INTERNAL MEDICINE CLINIC | Age: 72
End: 2022-10-26

## 2022-10-26 VITALS
TEMPERATURE: 98.1 F | HEART RATE: 102 BPM | DIASTOLIC BLOOD PRESSURE: 81 MMHG | SYSTOLIC BLOOD PRESSURE: 135 MMHG | RESPIRATION RATE: 16 BRPM | BODY MASS INDEX: 24.86 KG/M2 | HEIGHT: 65 IN | WEIGHT: 149.2 LBS | OXYGEN SATURATION: 98 %

## 2022-10-26 DIAGNOSIS — H81.10 BENIGN PAROXYSMAL POSITIONAL VERTIGO, UNSPECIFIED LATERALITY: Primary | ICD-10-CM

## 2022-10-26 DIAGNOSIS — H53.8 BLURRY VISION: ICD-10-CM

## 2022-10-26 DIAGNOSIS — M06.9 RHEUMATOID ARTHRITIS INVOLVING SHOULDER, UNSPECIFIED LATERALITY, UNSPECIFIED WHETHER RHEUMATOID FACTOR PRESENT (HCC): ICD-10-CM

## 2022-10-26 PROCEDURE — G9899 SCRN MAM PERF RSLTS DOC: HCPCS | Performed by: FAMILY MEDICINE

## 2022-10-26 PROCEDURE — G8399 PT W/DXA RESULTS DOCUMENT: HCPCS | Performed by: FAMILY MEDICINE

## 2022-10-26 PROCEDURE — G8420 CALC BMI NORM PARAMETERS: HCPCS | Performed by: FAMILY MEDICINE

## 2022-10-26 PROCEDURE — 99214 OFFICE O/P EST MOD 30 MIN: CPT | Performed by: FAMILY MEDICINE

## 2022-10-26 PROCEDURE — 1090F PRES/ABSN URINE INCON ASSESS: CPT | Performed by: FAMILY MEDICINE

## 2022-10-26 PROCEDURE — 1101F PT FALLS ASSESS-DOCD LE1/YR: CPT | Performed by: FAMILY MEDICINE

## 2022-10-26 PROCEDURE — G8432 DEP SCR NOT DOC, RNG: HCPCS | Performed by: FAMILY MEDICINE

## 2022-10-26 PROCEDURE — G8536 NO DOC ELDER MAL SCRN: HCPCS | Performed by: FAMILY MEDICINE

## 2022-10-26 PROCEDURE — G8427 DOCREV CUR MEDS BY ELIG CLIN: HCPCS | Performed by: FAMILY MEDICINE

## 2022-10-26 PROCEDURE — 3017F COLORECTAL CA SCREEN DOC REV: CPT | Performed by: FAMILY MEDICINE

## 2022-10-26 RX ORDER — MECLIZINE HCL 12.5 MG 12.5 MG/1
12.5 TABLET ORAL
Qty: 30 TABLET | Refills: 0 | Status: SHIPPED | OUTPATIENT
Start: 2022-10-26 | End: 2022-11-05

## 2022-10-26 NOTE — PROGRESS NOTES
SUBJECTIVE:   Ms. Sara Ramey is a 74 y.o. female who is here for follow up of routine medical issues. Dizziness: Pt presents for dizziness and headaches. She states the headaches are chronic and describes them as a roaring in her head or similar to migraines. She denies dizziness when bending her head forward, but states that she feels off balance upon standing. Her daughter indicates that she sees the patient often grab objects for stability. Pt has also informed her daughter of feelings of fainting. Rheumatoid Arthritis: She also reports arthralgia and notes pain in her right knee, which also leads to imbalance when standing up. Pt has been diagnosed with rheumatoid arthritis. She has not seen her rheumatologist in at least a year. Pt also reports declining hearing and tinnitus. Pt notes experiencing neuropathy in her toes. Blurry Vision: Pt has had cataracts removed from both eyes and reports worsening vision. She had COVID one month ago. Pt's daughter denies her mentioning dizziness before having COVID. The arthritis persisted prior to infection. Pt received Paxlovid for COVID. At this time, she is otherwise doing well and has brought no other complaints to my attention today. For a list of the medical issues addressed today, see the assessment and plan below. PMH:   Past Medical History:   Diagnosis Date    Cancer ([facility]     throat    Chronic kidney disease     Memory loss 06/2022     PSH:  has a past surgical history that includes hx transplant (04/03/1997) and hx gyn. All: is allergic to adhesive, codeine, and gabapentin. MEDS:   Current Outpatient Medications   Medication Sig    meclizine (ANTIVERT) 12.5 mg tablet Take 1 Tablet by mouth three (3) times daily as needed for Dizziness for up to 10 days.     zolpidem (AMBIEN) 5 mg tablet TAKE 1 TABLET BY MOUTH EVERY DAY NIGHTLY FOR SLEEP    predniSONE (DELTASONE) 5 mg tablet TAKE 1 TABLET BY MOUTH ONCE DAILY    sodium bicarbonate 650 mg tablet TAKE 1 TABLET BY MOUTH FOUR TIMES A DAY    cycloSPORINE (SandIMMUNE) 25 mg capsule Take 3 Capsules by mouth two (2) times a day. Dx code: Z94.0    ondansetron (ZOFRAN ODT) 4 mg disintegrating tablet Take 1 Tablet by mouth every eight (8) hours as needed for Nausea or Vomiting.    calcium-cholecalciferol, d3, 600-125 mg-unit tab Take 600 mg by mouth two (2) times a day. No current facility-administered medications for this visit. FH: family history includes Diabetes in her mother; No Known Problems in her father. SH:  reports that she has never smoked. She has never used smokeless tobacco. She reports that she does not currently use alcohol. She reports that she does not use drugs. Review of Systems - History obtained from the patient  General ROS: no fever, chills, fatigue, body aches  Psychological ROS: no change in anxiety, depression, SI/HI  Ophthalmic ROS: + blurred vision  ENT ROS: no dysphagia, otalgia, otorrhea, rhinorrhea, post nasal drip  Respiratory ROS: no cough, shortness of breath, or wheezing  Cardiovascular ROS: no chest pain or dyspnea on exertion  Gastrointestinal ROS: no abdominal pain, change in bowel habits, or black or bloody stools  Genito-Urinary ROS: no frequency, urgency, incontinence, dysuria, hematouria  Musculoskeletal ROS: + arthralagia, myalgia  Neurological ROS: +headaches, dizziness  Dermatological ROS: no rash or lesions    OBJECTIVE:   Vitals: Visit Vitals  /81 (BP 1 Location: Left arm, BP Patient Position: Sitting, BP Cuff Size: Large adult)   Pulse (!) 102   Temp 98.1 °F (36.7 °C) (Temporal)   Resp 16   Ht 5' 5\" (1.651 m)   Wt 149 lb 3.2 oz (67.7 kg)   SpO2 98%   BMI 24.83 kg/m²      Gen: Pleasant 74 y.o.  female in NAD. HEENT: PERRLA. EOMI. OP moist and pink. Neck: Supple. No LAD. HEART: RRR, No M/G/R.      LUNGS: CTAB No W/R. ABDOMEN: S, NT, ND, BS+. EXTREMITIES: Warm.  No C/C/E.    MUSCULOSKELETAL: Normal ROM, muscle strength 5/5 all groups. NEURO: Alert and oriented x 3. Cranial nerves grossly intact. No focal sensory or motor deficits noted. SKIN: Warm. Dry. No rashes or other lesions noted. ASSESSMENT/ PLAN: Diagnoses and all orders for this visit:    1. Benign paroxysmal positional vertigo, unspecified laterality  -     meclizine (ANTIVERT) 12.5 mg tablet; Take 1 Tablet by mouth three (3) times daily as needed for Dizziness for up to 10 days. 2. Rheumatoid arthritis involving shoulder, unspecified laterality, unspecified whether rheumatoid factor present ([address]    3. Blurry vision    1. Benign paroxysmal positional vertigo, unspecified laterality  I prescribed her meclizine for her dizziness. -     meclizine (ANTIVERT) 12.5 mg tablet; Take 1 Tablet by mouth three (3) times daily as needed for Dizziness for up to 10 days. 2. Rheumatoid arthritis involving shoulder, unspecified laterality, unspecified whether rheumatoid factor present ([facility]  I believe COVID caused overall inflammation, which trigger her arthritis. I recommended her to see a rheumatologist regarding this. 3. Blurry vision  I advised her to follow up with an ophthalmologist.     ICD-10-CM ICD-9-CM    1. Benign paroxysmal positional vertigo, unspecified laterality  H81.10 386.11 meclizine (ANTIVERT) 12.5 mg tablet      2. Rheumatoid arthritis involving shoulder, unspecified laterality, unspecified whether rheumatoid factor present ([facility]  M06.9 714.0       3. Blurry vision  H53.8 368.8                I have reviewed the patient's medications and risks/side effects/benefits were discussed. Diagnosis(-es) explained to patient and questions answered. Literature provided where appropriate.      Written by Melody Muñiz, as dictated by Christina Greenwood MD.

## 2022-10-26 NOTE — PROGRESS NOTES
1. \"Have you been to the ER, urgent care clinic since your last visit? Hospitalized since your last visit? \" No    2. \"Have you seen or consulted any other health care providers outside of the [address] [place] [address] since your last visit? \" No     3. For patients aged 39-70: Has the patient had a colonoscopy / FIT/ Cologuard? Yes - no Care Gap present      If the patient is female:    4. For patients aged 41-77: Has the patient had a mammogram within the past 2 years? Yes - no Care Gap present      5. For patients aged 21-65: Has the patient had a pap smear?  NA - based on age or sex

## 2022-11-01 ENCOUNTER — TELEPHONE (OUTPATIENT)
Dept: INTERNAL MEDICINE CLINIC | Age: 72
End: 2022-11-01

## 2022-11-01 NOTE — TELEPHONE ENCOUNTER
----- Message from Delmar Carpenter sent at 11/1/2022  1:05 PM EDT -----  Subject: Appointment Request    Reason for Call: Established Patient Appointment needed: Routine Existing   Condition Follow Up    QUESTIONS    Reason for appointment request? Available appointments did not meet   patient need     Additional Information for Provider? pt daughter Juan Antonio Bertrand said pt is needing   and order for fusions and to see her arthritis provider, Juan Antonio Bertrand is not sure   whom that is or if pcp could request one, she missed her fusion and she is   needing a new order please follow up she is needing to be seen sooner than   whats avail   ---------------------------------------------------------------------------  --------------  Daryle Haver [facility]  [phone]; OK to leave message on voicemail  ---------------------------------------------------------------------------  --------------  SCRIPT ANSWERS  COVID Screen: Beatriz Wesley

## 2022-11-02 NOTE — TELEPHONE ENCOUNTER
TYLOR for return call. Dr Umair Odell has previously referred patient to see rheumatologist Dr. Baron Nolan. Last order placed for infusions was in 2020. Dr. Umair Odell needs to know what type of infusion pt is requesting in order to place a new order.

## 2022-11-04 ENCOUNTER — APPOINTMENT (OUTPATIENT)
Dept: INFUSION THERAPY | Age: 72
End: 2022-11-04

## 2022-11-09 NOTE — TELEPHONE ENCOUNTER
#[phone]  daughter, Inez Curiel states that they have been waiting over a week on calls for appointments for a RA and the infusion center. They have not heard anything. Please call to advise. Thanks.

## 2022-11-14 ENCOUNTER — HOSPITAL ENCOUNTER (OUTPATIENT)
Dept: MAMMOGRAPHY | Age: 72
Discharge: HOME | End: 2022-11-14
Attending: INTERNAL MEDICINE
Payer: MEDICARE

## 2022-11-14 DIAGNOSIS — M81.0 POSTMENOPAUSAL BONE LOSS: ICD-10-CM

## 2022-11-14 PROCEDURE — 77080 DXA BONE DENSITY AXIAL: CPT

## 2022-11-15 NOTE — PROGRESS NOTES
Dexa scan shows osteoporosis. Would suggest starting prolia injections. Would also recommend starting vit D and calcium supplements.

## 2022-11-16 ENCOUNTER — TELEPHONE (OUTPATIENT)
Dept: INTERNAL MEDICINE CLINIC | Age: 72
End: 2022-11-16

## 2022-11-16 NOTE — PROGRESS NOTES
Left detailed message on personalized vm for patient to call back and let us know if they would like to start prolia. Dexa scan shows osteoporosis.  Would suggest starting prolia injections.     Would also recommend starting vit D and calcium supplements.

## 2022-11-16 NOTE — TELEPHONE ENCOUNTER
[phone]  please try this number for call back per silke      Pt daughter returned call again, please try calling back using alternate number provided.

## 2022-11-16 NOTE — TELEPHONE ENCOUNTER
Patient daughter is returning call from deena regarding dexa results and prolia question    Please call back when able

## 2022-11-16 NOTE — PROGRESS NOTES
Called, spoke to [place] (HIPAA)  Two pt identifiers confirmed. Carlos Manuel informed Dexa results per. Marco Brantley. See message below. Dexa scan shows osteoporosis.    Would suggest starting prolia injections.     Would also recommend starting vit D and calcium supplements. [place] states patient is currently taking calcium 1200 mg twice a day now and would like to know how much vitamin D does he wants her to take. [place] verbalized understanding of information discussed with no further questions at this time.      Berenice Matthews LPN

## 2022-11-16 NOTE — TELEPHONE ENCOUNTER
Called, spoke to [place] (HIPAA)  Two pt identifiers confirmed. Carlos Manuel informed Dexa results per. Jacque Perez. See message below. Dexa scan shows osteoporosis. Would suggest starting prolia injections. Would also recommend starting vit D and calcium supplements. [place] states patient is currently taking calcium 1200 mg twice a day now and would like to know how much vitamin D does he wants her to take. [place] verbalized understanding of information discussed with no further questions at this time.      Bee Pugh LPN

## 2022-11-17 ENCOUNTER — TELEPHONE (OUTPATIENT)
Dept: INTERNAL MEDICINE CLINIC | Age: 72
End: 2022-11-17

## 2022-11-17 DIAGNOSIS — M06.9 RHEUMATOID ARTHRITIS INVOLVING SHOULDER, UNSPECIFIED LATERALITY, UNSPECIFIED WHETHER RHEUMATOID FACTOR PRESENT (HCC): Primary | ICD-10-CM

## 2022-11-17 NOTE — TELEPHONE ENCOUNTER
States Dr Leatha Mcgowan (rheumatology) is no longer practicing at the practice    States other doctors at that practice are booked to October of next year    States do you have another practice or what is recommended    [phone] Keith Jara, Daughter

## 2022-11-18 NOTE — TELEPHONE ENCOUNTER
Attempted to call patient's daughter. She was not there. These are the two providers he recommends  rick arriaga  or joey radford.  [phone]

## 2022-11-28 DIAGNOSIS — G47.00 INSOMNIA, UNSPECIFIED TYPE: ICD-10-CM

## 2022-11-28 RX ORDER — ZOLPIDEM TARTRATE 5 MG/1
TABLET ORAL
Qty: 30 TABLET | Refills: 5 | Status: SHIPPED | OUTPATIENT
Start: 2022-11-28

## 2022-11-28 NOTE — TELEPHONE ENCOUNTER
Future Appointments:  Future Appointments   Date Time Provider Yoseph Carter   12/9/2022  3:00 PM SEYMOUR MENDEZ [address] REG   2/8/2023 10:40 AM Yanick Norris DO [facility]        Last Appointment With Me:  8/9/2022     Requested Prescriptions     Pending Prescriptions Disp Refills    zolpidem (AMBIEN) 5 mg tablet 30 Tablet 5     Sig: TAKE 1 TABLET BY MOUTH EVERY DAY NIGHTLY FOR SLEEP   Daughter, Rd Smith states that pt only has enough of this medication to last through next week. Pt will then be without medication for a week as this can't be filled until 12-9-22 she believes. She doesn't know what went wrong as to why there is a shortage. She is asking if there is any way that pt can get a weeks worth early some way? Please call Rd Smith to let her know. Thanks.

## 2022-11-28 NOTE — TELEPHONE ENCOUNTER
Daughter, Liliya Beard states that pt only has enough of this medication to last through next week. Pt will then be without medication for a week as this can't be filled until 12-9-22 she believes. She doesn't know what went wrong as to why there is a shortage. She is asking if there is any way that pt can get a weeks worth early some way? Please call Liliya Beard to let her know. Thanks.

## 2022-12-01 ENCOUNTER — TRANSCRIBE ORDER (OUTPATIENT)
Dept: SCHEDULING | Age: 72
End: 2022-12-01

## 2022-12-01 DIAGNOSIS — Z12.31 SCREENING MAMMOGRAM FOR HIGH-RISK PATIENT: Primary | ICD-10-CM

## 2022-12-20 ENCOUNTER — OFFICE VISIT (OUTPATIENT)
Dept: INTERNAL MEDICINE CLINIC | Age: 72
End: 2022-12-20
Payer: MEDICARE

## 2022-12-20 VITALS
OXYGEN SATURATION: 98 % | WEIGHT: 141.8 LBS | DIASTOLIC BLOOD PRESSURE: 72 MMHG | RESPIRATION RATE: 16 BRPM | TEMPERATURE: 97.2 F | BODY MASS INDEX: 23.63 KG/M2 | HEART RATE: 72 BPM | HEIGHT: 65 IN | SYSTOLIC BLOOD PRESSURE: 106 MMHG

## 2022-12-20 DIAGNOSIS — R53.83 FATIGUE, UNSPECIFIED TYPE: Primary | ICD-10-CM

## 2022-12-20 DIAGNOSIS — G43.109 MIGRAINE WITH AURA AND WITHOUT STATUS MIGRAINOSUS, NOT INTRACTABLE: ICD-10-CM

## 2022-12-20 DIAGNOSIS — F32.1 CURRENT MODERATE EPISODE OF MAJOR DEPRESSIVE DISORDER WITHOUT PRIOR EPISODE (HCC): ICD-10-CM

## 2022-12-20 PROCEDURE — G8420 CALC BMI NORM PARAMETERS: HCPCS | Performed by: STUDENT IN AN ORGANIZED HEALTH CARE EDUCATION/TRAINING PROGRAM

## 2022-12-20 PROCEDURE — G8427 DOCREV CUR MEDS BY ELIG CLIN: HCPCS | Performed by: STUDENT IN AN ORGANIZED HEALTH CARE EDUCATION/TRAINING PROGRAM

## 2022-12-20 PROCEDURE — G8511 SCR DEP POS, NO PLAN DOC RNG: HCPCS | Performed by: STUDENT IN AN ORGANIZED HEALTH CARE EDUCATION/TRAINING PROGRAM

## 2022-12-20 PROCEDURE — G8536 NO DOC ELDER MAL SCRN: HCPCS | Performed by: STUDENT IN AN ORGANIZED HEALTH CARE EDUCATION/TRAINING PROGRAM

## 2022-12-20 PROCEDURE — G8399 PT W/DXA RESULTS DOCUMENT: HCPCS | Performed by: STUDENT IN AN ORGANIZED HEALTH CARE EDUCATION/TRAINING PROGRAM

## 2022-12-20 PROCEDURE — 1101F PT FALLS ASSESS-DOCD LE1/YR: CPT | Performed by: STUDENT IN AN ORGANIZED HEALTH CARE EDUCATION/TRAINING PROGRAM

## 2022-12-20 PROCEDURE — 3017F COLORECTAL CA SCREEN DOC REV: CPT | Performed by: STUDENT IN AN ORGANIZED HEALTH CARE EDUCATION/TRAINING PROGRAM

## 2022-12-20 PROCEDURE — G9899 SCRN MAM PERF RSLTS DOC: HCPCS | Performed by: STUDENT IN AN ORGANIZED HEALTH CARE EDUCATION/TRAINING PROGRAM

## 2022-12-20 PROCEDURE — 99214 OFFICE O/P EST MOD 30 MIN: CPT | Performed by: STUDENT IN AN ORGANIZED HEALTH CARE EDUCATION/TRAINING PROGRAM

## 2022-12-20 PROCEDURE — 1090F PRES/ABSN URINE INCON ASSESS: CPT | Performed by: STUDENT IN AN ORGANIZED HEALTH CARE EDUCATION/TRAINING PROGRAM

## 2022-12-20 RX ORDER — AMITRIPTYLINE HYDROCHLORIDE 50 MG/1
50 TABLET, FILM COATED ORAL
Qty: 60 TABLET | Refills: 1 | Status: SHIPPED | OUTPATIENT
Start: 2022-12-20

## 2022-12-20 RX ORDER — CALCIUM CARBONATE 600 MG
600 TABLET ORAL 2 TIMES DAILY
COMMUNITY

## 2022-12-20 NOTE — PROGRESS NOTES
Identified pt with two pt identifiers(name and ). Reviewed record in preparation for visit and have obtained necessary documentation. Chief Complaint   Patient presents with    Headache    Depression    Lethargy     Covid positive , Negative          Health Maintenance Due   Topic    Shingrix Vaccine Age 50> (1 of 2)    DTaP/Tdap/Td series (1 - Tdap)    Colorectal Cancer Screening Combo     Flu Vaccine (1)    COVID-19 Vaccine (4 - Booster for [email or web address] series)      Visit Vitals  /72 (BP 1 Location: Right arm, BP Patient Position: Sitting, BP Cuff Size: Large adult)   Pulse 72   Temp 97.2 °F (36.2 °C) (Temporal)   Resp 16   Ht 5' 5\" (1.651 m)   Wt 141 lb 12.8 oz (64.3 kg)   SpO2 98%   BMI 23.60 kg/m²     Pain Scale: 10    Coordination of Care Questionnaire:  :   1. Have you been to the ER, urgent care clinic since your last visit? Hospitalized since your last visit? Yes     2. Have you seen or consulted any other health care providers outside of the [address] [place] [address] since your last visit? Include any pap smears or colon screening.   yes    3 most recent PHQ Screens 2022   Little interest or pleasure in doing things Nearly every day   Feeling down, depressed, irritable, or hopeless Nearly every day   Total Score PHQ 2 6   Trouble falling or staying asleep, or sleeping too much Nearly every day   Feeling tired or having little energy Nearly every day   Poor appetite, weight loss, or overeating Nearly every day   Feeling bad about yourself - or that you are a failure or have let yourself or your family down Not at all   Trouble concentrating on things such as school, work, reading, or watching TV Nearly every day   Moving or speaking so slowly that other people could have noticed; or the opposite being so fidgety that others notice Not at all   Thoughts of being better off dead, or hurting yourself in some way Not at all   PHQ 9 Score 18   How difficult have these problems made it for you to do your work, take care of your home and get along with others Not difficult at all

## 2022-12-20 NOTE — PROGRESS NOTES
Good Help to Those in [facility]   Internal Medicine  [address] [facility] [address], 235 [facility]  [address]  [place], 200 [facility]  [phone]      Primary Care Visit Note    Assessment/Plan:     Diagnoses and all orders for this visit:    1. Fatigue, unspecified type  -     METABOLIC PANEL, COMPREHENSIVE; Future  -     CBC WITH AUTOMATED DIFF; Future  -     TSH 3RD GENERATION; Future    2. Current moderate episode of major depressive disorder without prior episode ([facility] [address]  - given migraines, insomnia and depression, amitriptyline would be ideal. Will be cautious however given med on [place] list. Still if her memory issues due in part to depression they may improve. - will attempt to replace ambien with amitriptyline. -     amitriptyline (ELAVIL) 50 mg tablet; Take 1 Tablet by mouth nightly. Take 1 pill nightly for 2 weeks then 2 pills nightly thereafter. 3. Migraine with aura and without status migrainosus, not intractable  -     amitriptyline (ELAVIL) 50 mg tablet; Take 1 Tablet by mouth nightly. Take 1 pill nightly for 2 weeks then 2 pills nightly thereafter. Follow up: Follow-up and Dispositions    Return in about 4 weeks (around 1/17/2023). Leighton Corrales MD    CC:     Chief Complaint   Patient presents with    Headache    Depression    Lethargy     Covid positive 12/8, Negative 12/16        HPI:     Giana Baez is a 74 y.o. female who presents for fatigue, depression, headaches. Patient has been depressed. She has been sleeping more and decreased energy. She has been experiencing headaches for the last couple months. She has had HA in the past but now have been more severe. They are located on L side of head. No recent change in weight, swelling in legs or trouble breathing. Pt has had some memory loss for which she is being seen by neurology. Pt has an appt with Dr. Suman Jones in January to discuss medication management. She has a remote hx of NHL.  No abnormal FOBT tests or mammograms. Kidney function has been stable on current medications for years. She gets headaches, that often come with flashes of light. . Patient has only been taking tylenol for it. No runny nose or eyes when she gets the headaches. ROS:   All 10 point review of systems negative except those mentioned in the HPI. Past Medical History: Active Ambulatory Problems     Diagnosis Date Noted    Obesity (BMI 30-39.9) 06/26/2019    Other insomnia 06/26/2019    Rheumatoid arthritis ([facility] [address] 12/06/2019    NHL (non-Hodgkin's lymphoma) [facility] 12/06/2019    Kidney transplant recipient 12/06/2019    Chronic renal disease, stage III 05/27/2022     Resolved Ambulatory Problems     Diagnosis Date Noted    No Resolved Ambulatory Problems     Past Medical History:   Diagnosis Date    Cancer ([facility]     Chronic kidney disease     Memory loss 06/2022          Current Medications:     Current Outpatient Medications:     cyanocobalamin, vitamin B-12, (VITAMIN B12 PO), Take  by mouth., Disp: , Rfl:     calcium carbonate (CALTREX) 600 mg calcium (1,500 mg) tablet, Take 600 mg by mouth two (2) times a day., Disp: , Rfl:     cholecalciferol, vitamin D3, (VITAMIN D3 PO), Take  by mouth., Disp: , Rfl:     zolpidem (AMBIEN) 5 mg tablet, TAKE 1 TABLET BY MOUTH EVERY DAY NIGHTLY FOR SLEEP, Disp: 30 Tablet, Rfl: 5    predniSONE (DELTASONE) 5 mg tablet, TAKE 1 TABLET BY MOUTH ONCE DAILY, Disp: 90 Tablet, Rfl: 3    sodium bicarbonate 650 mg tablet, TAKE 1 TABLET BY MOUTH FOUR TIMES A DAY, Disp: 360 Tablet, Rfl: 3    cycloSPORINE (SandIMMUNE) 25 mg capsule, Take 3 Capsules by mouth two (2) times a day.  Dx code: Z94.0, Disp: 540 Capsule, Rfl: 3    ondansetron (ZOFRAN ODT) 4 mg disintegrating tablet, Take 1 Tablet by mouth every eight (8) hours as needed for Nausea or Vomiting., Disp: 30 Tablet, Rfl: 0    calcium-cholecalciferol, d3, 600-125 mg-unit tab, Take 600 mg by mouth two (2) times a day., Disp: , Rfl:       Past Surgical History:     Past Surgical History:   Procedure Laterality Date    HX GYN      hysterectomy     HX TRANSPLANT  04/03/1997    kidney         Family History:     Family History   Problem Relation Age of Onset    Diabetes Mother     No Known Problems Father          Social History:     Social History     Socioeconomic History    Marital status:      Spouse name: Not on file    Number of children: Not on file    Years of education: Not on file    Highest education level: Not on file   Occupational History    Not on file   Tobacco Use    Smoking status: Never    Smokeless tobacco: Never   Vaping Use    Vaping Use: Never used   Substance and Sexual Activity    Alcohol use: Not Currently    Drug use: Never    Sexual activity: Not Currently   Other Topics Concern    Not on file   Social History Narrative    Not on file     Social Determinants of Health     Financial Resource Strain: Low Risk     Difficulty of Paying Living Expenses: Not very hard   Food Insecurity: No Food Insecurity    Worried About Running Out of Food in the Last Year: Never true    Ran Out of Food in the Last Year: Never true   Transportation Needs: Not on file   Physical Activity: Not on file   Stress: Not on file   Social Connections: Not on file   Intimate Partner Violence: Not on file   Housing Stability: Not on file            Visit Vitals  /72 (BP 1 Location: Right arm, BP Patient Position: Sitting, BP Cuff Size: Large adult)   Pulse 72   Temp 97.2 °F (36.2 °C) (Temporal)   Resp 16   Ht 5' 5\" (1.651 m)   Wt 141 lb 12.8 oz (64.3 kg)   SpO2 98%   BMI 23.60 kg/m²       Physical Exam:   General - Well appearing  HEENT - EOMI, non-icteric sclera.    Pulm - clear to auscultation bilaterally  Cardio - RRR, normal S1 S2, no murmur  Abd - soft, nontender, nabs  Extrem - no edema  Neuro-  Alert and oriented, No focal deficits      Labs/Imaging:     Labs and imaging reviewed by me and significant for:    Lab Results   Component Value Date/Time Hemoglobin A1c 4.9 01/28/2020 08:07 AM     Lab Results   Component Value Date/Time    TSH 1.630 03/31/2021 09:44 AM     Lab Results   Component Value Date/Time    Sodium 138 02/03/2022 12:06 PM    Potassium 4.6 02/03/2022 12:06 PM    Chloride 112 (H) 02/03/2022 12:06 PM    CO2 22 02/03/2022 12:06 PM    Anion gap 4 (L) 02/03/2022 12:06 PM    Glucose 116 (H) 02/03/2022 12:06 PM    BUN 20 02/03/2022 12:06 PM    Creatinine 0.93 02/03/2022 12:06 PM    BUN/Creatinine ratio 22 (H) 02/03/2022 12:06 PM    GFR est AA >60 02/03/2022 12:06 PM    GFR est non-AA 59 (L) 02/03/2022 12:06 PM    Calcium 9.0 02/03/2022 12:06 PM    Bilirubin, total 0.7 07/27/2021 07:55 AM    Alk.  phosphatase 82 07/27/2021 07:55 AM    Protein, total 6.3 (L) 07/27/2021 07:55 AM    Albumin 3.7 07/27/2021 07:55 AM    Globulin 2.6 07/27/2021 07:55 AM    A-G Ratio 1.4 07/27/2021 07:55 AM    ALT (SGPT) 27 07/27/2021 07:55 AM    AST (SGOT) 24 07/27/2021 07:55 AM     Lab Results   Component Value Date/Time    WBC 6.9 02/03/2022 12:06 PM    HGB 13.6 02/03/2022 12:06 PM    HCT 40.9 02/03/2022 12:06 PM    PLATELET 746 63/95/8736 12:06 PM    .2 (H) 02/03/2022 12:06 PM

## 2022-12-21 LAB
ALBUMIN SERPL-MCNC: 3.7 G/DL (ref 3.5–5)
ALBUMIN/GLOB SERPL: 1.4 {RATIO} (ref 1.1–2.2)
ALP SERPL-CCNC: 134 U/L (ref 45–117)
ALT SERPL-CCNC: 28 U/L (ref 12–78)
ANION GAP SERPL CALC-SCNC: 7 MMOL/L (ref 5–15)
AST SERPL-CCNC: 30 U/L (ref 15–37)
BASOPHILS # BLD: 0.1 K/UL (ref 0–0.1)
BASOPHILS NFR BLD: 1 % (ref 0–1)
BILIRUB SERPL-MCNC: 0.8 MG/DL (ref 0.2–1)
BUN SERPL-MCNC: 18 MG/DL (ref 6–20)
BUN/CREAT SERPL: 14 (ref 12–20)
CALCIUM SERPL-MCNC: 9.4 MG/DL (ref 8.5–10.1)
CHLORIDE SERPL-SCNC: 106 MMOL/L (ref 97–108)
CO2 SERPL-SCNC: 26 MMOL/L (ref 21–32)
CREAT SERPL-MCNC: 1.3 MG/DL (ref 0.55–1.02)
DIFFERENTIAL METHOD BLD: ABNORMAL
EOSINOPHIL # BLD: 0.1 K/UL (ref 0–0.4)
EOSINOPHIL NFR BLD: 1 % (ref 0–7)
ERYTHROCYTE [DISTWIDTH] IN BLOOD BY AUTOMATED COUNT: 13 % (ref 11.5–14.5)
GLOBULIN SER CALC-MCNC: 2.7 G/DL (ref 2–4)
GLUCOSE SERPL-MCNC: 139 MG/DL (ref 65–100)
HCT VFR BLD AUTO: 46.6 % (ref 35–47)
HGB BLD-MCNC: 15 G/DL (ref 11.5–16)
IMM GRANULOCYTES # BLD AUTO: 0 K/UL (ref 0–0.04)
IMM GRANULOCYTES NFR BLD AUTO: 0 % (ref 0–0.5)
LYMPHOCYTES # BLD: 1.3 K/UL (ref 0.8–3.5)
LYMPHOCYTES NFR BLD: 16 % (ref 12–49)
MCH RBC QN AUTO: 34.8 PG (ref 26–34)
MCHC RBC AUTO-ENTMCNC: 32.2 G/DL (ref 30–36.5)
MCV RBC AUTO: 108.1 FL (ref 80–99)
MONOCYTES # BLD: 0.6 K/UL (ref 0–1)
MONOCYTES NFR BLD: 7 % (ref 5–13)
NEUTS SEG # BLD: 5.9 K/UL (ref 1.8–8)
NEUTS SEG NFR BLD: 75 % (ref 32–75)
NRBC # BLD: 0 K/UL (ref 0–0.01)
NRBC BLD-RTO: 0 PER 100 WBC
PLATELET # BLD AUTO: 182 K/UL (ref 150–400)
POTASSIUM SERPL-SCNC: 4.4 MMOL/L (ref 3.5–5.1)
PROT SERPL-MCNC: 6.4 G/DL (ref 6.4–8.2)
RBC # BLD AUTO: 4.31 M/UL (ref 3.8–5.2)
RBC MORPH BLD: ABNORMAL
RBC MORPH BLD: ABNORMAL
SODIUM SERPL-SCNC: 139 MMOL/L (ref 136–145)
TSH SERPL DL<=0.05 MIU/L-ACNC: 0.75 UIU/ML (ref 0.36–3.74)
WBC # BLD AUTO: 8 K/UL (ref 3.6–11)

## 2022-12-21 NOTE — PROGRESS NOTES
Most recent labs showing that the kidney function is a little worse, please schedule an appointment with your kidney doctor within the next couple weeks. The MCV, or the size of the red blood cells, is increased indicating you may still be low on b12. Please make sure you are taking the b12 supplement daily, we will recheck this level at the next visit. Either the b12 level or worsened kidney function could be contributing to your symptoms. We can discuss more at the next visit.

## 2022-12-22 NOTE — PROGRESS NOTES
Daughter Abdiel Vivian return call (HIPAA)  Two pt identifiers confirmed.     Notified of lab results and recommendations from Dr. Fausto Andrews   Daughter wants labs sent to [facility] Fax [phone]  Results sent

## 2023-01-11 ENCOUNTER — PATIENT OUTREACH (OUTPATIENT)
Dept: CASE MANAGEMENT | Age: 73
End: 2023-01-11

## 2023-01-12 DIAGNOSIS — H81.10 BENIGN PAROXYSMAL POSITIONAL VERTIGO, UNSPECIFIED LATERALITY: ICD-10-CM

## 2023-01-12 RX ORDER — MECLIZINE HCL 12.5 MG 12.5 MG/1
12.5 TABLET ORAL
Qty: 30 TABLET | Refills: 0 | Status: SHIPPED | OUTPATIENT
Start: 2023-01-12 | End: 2023-01-22

## 2023-01-12 NOTE — PROGRESS NOTES
Ambulatory Care Management Note    Date/Time:  1/12/2023 9:34 AM    This patient was received as a referral from [facility]. Ambulatory Care Manager outreached to patient today to offer care management services. Introduction to self and role of care manager provided. Pt's dtr Rain Damon pt cont's to experience dizziness, but is not taking meclizine. Per pt's EMR, pt was prescribed Antivert by PCP at 10/26/22 visit. Inez Curiel sts she isn't aware of this Rx.  ACM instructed Carlos Manuel to call PCP's office to have this Rx reordered, but in the meantime she could go to her local pharmacy and purchase Dramamine Non-Drowsy Formula (meclizine) and take as directed on the bottle. AC then did a Med Rec on pt and asked Inez Lentzton how pt is doing on the amitriptyline and she sts she doesn't notice any difference. In fact, pt seems to be more argumentative as of recently. AC informed dtr that this behavior occurs quite frequently in pt's w/dementia and may progress w/time. Pt has an upcoming Neuro appt 1/16/23 and she will discuss this further w/Ms . However, Inez Curiel does note the amitriptyline seems to be effective w/pt's insomnia. Carlos Manuel sts pt was directed to use the Ambien on a prn basis now that pt is on amitriptyline and pt requires the zolpidem very infrequently. Patient' CG/Dtr Merleneemani Curiel accepted care management services at this time. Follow up call was scheduled.   Patient has Ambulatory Care Manager's contact number for any questions or concerns going forward and was encouraged to call prn.    /dla       This [facility] reviewed and updated the following screenings during this call; general assessment, self management assessment, SDOH assessments, and medication reconciliation     Patient's challenges to self-management identified:   depression, functional cognitive ability, multi health system providers, and polypharmacy      Medication Management:  good adherence and good understanding; no report of S/E; Pt still manages her own pill box, but dtr/CG oversees    Advance Care Planning:   Does patient have an Advance Directive:   deferred to next call    [employer], Referrals, and Durable Medical Equipment: n/a      Health Maintenance Due   Topic Date Due    Shingles Vaccine (1 of 2) Never done    DTaP/Tdap/Td series (1 - Tdap) Never done    Colorectal Cancer Screening Combo  04/02/2022    Flu Vaccine (1) 08/01/2022    COVID-19 Vaccine (4 - Booster for [facility] series) 09/28/2022     Health Maintenance Reviewed: Yes    Patient was asked to consider health care goals that they would like to focus on with this ACM.    ACM will follow up with patient to discuss goals and establish care plan in the next 7-14 days.   /dla      PCP/Specialist follow up:     1/19/23            Margarito Clement MD      [facility]  Future Appointments   Date Time Provider Yoseph Carter   1/16/2023  9:30 AM Milagros BYERS BS AMB   1/17/2023 11:00 AM Yady Carrillo MD [facility] BS AMB   2/8/2023 10:40 AM Serafin Rivera III, DO [facility] BS AMB

## 2023-01-12 NOTE — TELEPHONE ENCOUNTER
Future Appointments:  Future Appointments   Date Time Provider Yoseph Emma   1/16/2023  9:30 AM Herbert BYERS BS AMB   1/17/2023 11:00 AM Gary Torres MD [facility] BS AMB   2/8/2023 10:40 AM Yohana Hayes DO [facility] BS AMB        Last Appointment With Me:  8/9/2022     Requested Prescriptions     Pending Prescriptions Disp Refills    meclizine (ANTIVERT) 12.5 mg tablet 30 Tablet 0     Sig: Take 1 Tablet by mouth three (3) times daily as needed for Dizziness for up to 10 days.

## 2023-01-13 DIAGNOSIS — G43.109 MIGRAINE WITH AURA AND WITHOUT STATUS MIGRAINOSUS, NOT INTRACTABLE: ICD-10-CM

## 2023-01-13 DIAGNOSIS — F32.1 CURRENT MODERATE EPISODE OF MAJOR DEPRESSIVE DISORDER WITHOUT PRIOR EPISODE (HCC): ICD-10-CM

## 2023-01-13 NOTE — TELEPHONE ENCOUNTER
PCP: Maine Carbajal DO    Last appt: 12/20/2022  Future Appointments   Date Time Provider Yoseph Carter   1/16/2023  9:30 AM Caty Taylor BYERS BS AMB   1/17/2023 11:00 AM Rama Clifton MD [facility] BS AMB   2/8/2023 10:40 AM Adams Wagner III, DO [facility] BS AMB       Requested Prescriptions     Pending Prescriptions Disp Refills    amitriptyline (ELAVIL) 50 mg tablet 60 Tablet 1     Sig: Take 1 Tablet by mouth nightly. Take 1 pill nightly for 2 weeks then 2 pills nightly thereafter.

## 2023-01-15 RX ORDER — AMITRIPTYLINE HYDROCHLORIDE 50 MG/1
100 TABLET, FILM COATED ORAL
Qty: 60 TABLET | Refills: 1 | Status: SHIPPED | OUTPATIENT
Start: 2023-01-15 | End: 2023-01-16 | Stop reason: SINTOL

## 2023-01-16 ENCOUNTER — OFFICE VISIT (OUTPATIENT)
Dept: NEUROLOGY | Age: 73
End: 2023-01-16
Payer: MEDICARE

## 2023-01-16 ENCOUNTER — TELEPHONE (OUTPATIENT)
Dept: NEUROLOGY | Age: 73
End: 2023-01-16

## 2023-01-16 VITALS
TEMPERATURE: 97.6 F | WEIGHT: 141 LBS | BODY MASS INDEX: 23.49 KG/M2 | SYSTOLIC BLOOD PRESSURE: 122 MMHG | HEIGHT: 65 IN | DIASTOLIC BLOOD PRESSURE: 78 MMHG | HEART RATE: 109 BPM | OXYGEN SATURATION: 96 % | RESPIRATION RATE: 16 BRPM

## 2023-01-16 DIAGNOSIS — R42 DIZZY SPELLS: ICD-10-CM

## 2023-01-16 DIAGNOSIS — F41.9 ANXIETY AND DEPRESSION: ICD-10-CM

## 2023-01-16 DIAGNOSIS — F32.A ANXIETY AND DEPRESSION: ICD-10-CM

## 2023-01-16 DIAGNOSIS — G63 VITAMIN B12 DEFICIENCY NEUROPATHY (HCC): ICD-10-CM

## 2023-01-16 DIAGNOSIS — E53.8 VITAMIN B12 DEFICIENCY NEUROPATHY (HCC): ICD-10-CM

## 2023-01-16 DIAGNOSIS — R41.3 MEMORY DISTURBANCE: Primary | ICD-10-CM

## 2023-01-16 PROBLEM — J44.1 COPD EXACERBATION (HCC): Status: ACTIVE | Noted: 2023-01-16

## 2023-01-16 PROBLEM — R07.89 COSTOCHONDRAL CHEST PAIN: Status: ACTIVE | Noted: 2023-01-16

## 2023-01-16 PROBLEM — E86.0 DEHYDRATION: Status: ACTIVE | Noted: 2023-01-16

## 2023-01-16 PROBLEM — R11.2 NAUSEA & VOMITING: Status: ACTIVE | Noted: 2023-01-16

## 2023-01-16 PROBLEM — J96.01 ACUTE HYPOXEMIC RESPIRATORY FAILURE (HCC): Status: ACTIVE | Noted: 2023-01-16

## 2023-01-16 PROBLEM — R00.0 TACHYCARDIA: Status: ACTIVE | Noted: 2023-01-16

## 2023-01-16 PROBLEM — R53.81 MALAISE: Status: ACTIVE | Noted: 2023-01-16

## 2023-01-16 PROBLEM — N39.0 URINARY TRACT INFECTION: Status: ACTIVE | Noted: 2023-01-16

## 2023-01-16 PROBLEM — G62.9 NEUROPATHY: Status: ACTIVE | Noted: 2023-01-16

## 2023-01-16 PROCEDURE — G8432 DEP SCR NOT DOC, RNG: HCPCS | Performed by: PSYCHIATRY & NEUROLOGY

## 2023-01-16 PROCEDURE — G8427 DOCREV CUR MEDS BY ELIG CLIN: HCPCS | Performed by: PSYCHIATRY & NEUROLOGY

## 2023-01-16 PROCEDURE — 99214 OFFICE O/P EST MOD 30 MIN: CPT | Performed by: PSYCHIATRY & NEUROLOGY

## 2023-01-16 PROCEDURE — G8399 PT W/DXA RESULTS DOCUMENT: HCPCS | Performed by: PSYCHIATRY & NEUROLOGY

## 2023-01-16 PROCEDURE — 3017F COLORECTAL CA SCREEN DOC REV: CPT | Performed by: PSYCHIATRY & NEUROLOGY

## 2023-01-16 PROCEDURE — 1101F PT FALLS ASSESS-DOCD LE1/YR: CPT | Performed by: PSYCHIATRY & NEUROLOGY

## 2023-01-16 PROCEDURE — G8536 NO DOC ELDER MAL SCRN: HCPCS | Performed by: PSYCHIATRY & NEUROLOGY

## 2023-01-16 PROCEDURE — G9899 SCRN MAM PERF RSLTS DOC: HCPCS | Performed by: PSYCHIATRY & NEUROLOGY

## 2023-01-16 PROCEDURE — 1090F PRES/ABSN URINE INCON ASSESS: CPT | Performed by: PSYCHIATRY & NEUROLOGY

## 2023-01-16 PROCEDURE — 1123F ACP DISCUSS/DSCN MKR DOCD: CPT | Performed by: PSYCHIATRY & NEUROLOGY

## 2023-01-16 PROCEDURE — G8420 CALC BMI NORM PARAMETERS: HCPCS | Performed by: PSYCHIATRY & NEUROLOGY

## 2023-01-16 RX ORDER — ACETAMINOPHEN 500 MG
TABLET ORAL
COMMUNITY
Start: 2022-12-30

## 2023-01-16 RX ORDER — SERTRALINE HYDROCHLORIDE 50 MG/1
50 TABLET, FILM COATED ORAL DAILY
Qty: 90 TABLET | Refills: 1 | Status: SHIPPED | OUTPATIENT
Start: 2023-01-16

## 2023-01-16 NOTE — LETTER
1/16/2023    Patient: Bryson Wilson   YOB: 1950   Date of Visit: 1/16/2023     Nielsen Jimmy III, DO  [address]  [facility]  [address] [facility] [address]    Dear Maria Elena Renteria,      Thank you for referring Ms. Jacky Agrawal to [address] [place] [address] for evaluation. My notes for this consultation are attached. If you have questions, please do not hesitate to call me. I look forward to following your patient along with you.       Sincerely,    Deedee Suarez, ANP-C

## 2023-01-16 NOTE — ASSESSMENT & PLAN NOTE
Probably contributory to cognitive status   Will check neuropsych testing for full comprehensive evaluation  Start Zoloft 50 mg/day we will increase as needed or tolerated

## 2023-01-16 NOTE — PROGRESS NOTES
[address]  In Office FOLLOW-UP VISIT         Kelton Prader is a 74 y.o. female who presents today for the following:  Chief Complaint   Patient presents with    Follow-up     Patient states that she has to stand for minute before she moves and daughter states that she gets dizzy when she is walking out of nowhere. Having memory issues. ASSESSMENT AND PLAN  Patient is known to this practice. This is my first time seeing the patient. Chart and history reviewed in detail at today's office visit. 1. Memory disturbance  Assessment & Plan:   Seems to be worsening  We will recheck neuropsych testing  Start Zoloft 50 mg/day might need to increase that over time    Orders:  -     REFERRAL TO NEUROPSYCHOLOGY  2. Anxiety and depression  Assessment & Plan:   Probably contributory to cognitive status   Will check neuropsych testing for full comprehensive evaluation  Start Zoloft 50 mg/day we will increase as needed or tolerated  Orders:  -     sertraline (ZOLOFT) 50 mg tablet; Take 1 Tablet by mouth daily. Indications: anxiousness associated with depression, Normal, Disp-90 Tablet, R-1  3. Dizzy spells  Assessment & Plan:   Seems to have escalated with the use of the amitriptyline  Discontinue amitriptyline  Increase free water  Start Zoloft 50 mg/day    Depending on how she does we may need to do ANS testing and check Holter monitor  4. Vitamin B12 deficiency neuropathy ([facility] [address]  Assessment & Plan:  Symptoms seemingly improved continue on B12 replacement    Patient and/or family was given time to ask questions and voice concerns. I believe all questions concerns were adequately addressed at this  office visit.   Patient and/or family also verbalized agreement and understanding of the above-stated plan    Complex neurologic decision making secondary any or all of the following to include unclear etiology, and /or polypharmacy, and/or significant comorbid conditions, and/or use of high-risk medications which complicate the decision making process related to patient's neurologic diagnosis          ICD-10-CM ICD-9-CM    1. Memory disturbance  R41.3 780.93 REFERRAL TO NEUROPSYCHOLOGY      2. Anxiety and depression  F41.9 300.00 sertraline (ZOLOFT) 50 mg tablet    F32. A 311       3. Dizzy spells  R42 780.4       4.  Vitamin B12 deficiency neuropathy (HCC)  E53.8 266.2     G63 357.4               HPI  Historical Data  Patient is known to the practice and was previously seen by multiple providers in the practice most recently Pushpa Guathier NP    Neurologic diagnosis  Memory disturbance   Neuropsych testing from 2021 supportive of age-related decline    Dizziness    Neuropathic pain both feet    Vitamin B12 deficiency      Other significant comorbid conditions/concerns  Rheumatoid arthritis  Non-Hodgkin's lymphoma  Kidney transplant recipient   History of chronic renal disease stage III      Interim Data:   Patient is here with her daughter and her granddaughter history obtained more from the family than the patient    Memory disturbance   Very forgetful   Needing a lot of supervision for ADLs and FALs   Geographic agnosia    Panic attacks   Confusing family identities and forgetting the names of grandchildren     Patient voices awareness that she is having significant cognitive difficulties      Dizziness  Getting worse: more dizzy spells during the day  With position changes or walking  Duration: seconds  Noticed worsening several weeks ago when started on Elavil; improved with reduction of amitriptyline from 100 mg down to 50 mg but still feels as though it is worse than baseline      Neuropathic pain: Both feet in the setting of vitamin B12 deficiency  Patient reports improvement not having as much pain continues on vitamin B12        Pertinent diagnostic data    5/26/2022 by Dr. Anthony Julien  EMG/NCS: This study was normal.  There was no electrodiagnostic evidence upon todays examination suggesting a focal or generalized large fiber neuropathy, myopathy, or radiculopathy. This study cannot exclude a small fiber neuropathy. Clinical correlation is recommended. Neuropsych testing completed by Dr. Kirk Culp February 16, 2021  DIAGNOSTIC IMPRESSIONS:      ICD-10-CM ICD-9-CM     1. Age-related cognitive decline  R41.81 294.9            Carotid duplex studies completed 4/11/2022  Interpretation Summary    This study consisted of pulsed wave Doppler examination, color flow imaging, and duplex imaging of both right and left carotid systems in both vertebral arteries     Imaging of both right and left carotid systems showed mild mixed plaque in the bifurcations and proximal and distal internal carotid arteries bilaterally with stenosis in the range of 0-15% only and with no flow abnormalities identified. Both external carotid arteries and both common carotid arteries showed showed normal antegrade flow, and showed mild disease in the range of 0-15% only without associated flow abnormalities. Both vertebral arteries showed normal antegrade flow. Clinical correlation recommended. Results from [place] encounter on 05/05/22    MRI BRAIN WO CONT    Impression  1. No acute findings, no mass. 2. Slight progression of nonspecific white matter changes. 3. Some improvement in diffuse paranasal sinus and mastoid air cell disease. Results from [place] encounter on 03/13/20    MRI BRAIN WO CONT    Impression  IMPRESSION:  1. Bilateral mastoid and possibly middle ear effusions in patient with  chronically enlarged partial empty sella raises possibility of CSF/mastoid  fistula secondary to chronic IIH. Correlate clinically and consider  high-resolution CT temporal bone for further evaluation. 2. Left maxillary sinus greater than left frontal sinus opacification with  restricted diffusion. This may represent infection in the left maxillary sinus.   3. Nonspecific white matter T2 hyperintensity is probably chronic. 23X              Allergies   Allergen Reactions    Adhesive Rash    Ambien [Zolpidem] Other (comments)     Sleepwalking    Codeine Vertigo    Gabapentin Vertigo     syncope       Current Outpatient Medications   Medication Sig    acetaminophen (TYLENOL) 500 mg tablet TAKE 2 TABLETS BY MOUTH EVERY 6 HOURS AS NEEDED FOR PAIN    sertraline (ZOLOFT) 50 mg tablet Take 1 Tablet by mouth daily. Indications: anxiousness associated with depression    meclizine (ANTIVERT) 12.5 mg tablet Take 1 Tablet by mouth three (3) times daily as needed for Dizziness for up to 10 days. cyanocobalamin, vitamin B-12, (VITAMIN B12 PO) Take  by mouth.    calcium carbonate (CALTREX) 600 mg calcium (1,500 mg) tablet Take 600 mg by mouth two (2) times a day. cholecalciferol, vitamin D3, (VITAMIN D3 PO) Take  by mouth.    zolpidem (AMBIEN) 5 mg tablet TAKE 1 TABLET BY MOUTH EVERY DAY NIGHTLY FOR SLEEP    predniSONE (DELTASONE) 5 mg tablet TAKE 1 TABLET BY MOUTH ONCE DAILY    sodium bicarbonate 650 mg tablet TAKE 1 TABLET BY MOUTH FOUR TIMES A DAY    cycloSPORINE (SandIMMUNE) 25 mg capsule Take 3 Capsules by mouth two (2) times a day. Dx code: Z94.0    ondansetron (ZOFRAN ODT) 4 mg disintegrating tablet Take 1 Tablet by mouth every eight (8) hours as needed for Nausea or Vomiting. No current facility-administered medications for this visit.        Past medical history/surgical history, family history, and social history have been reviewed for today's visit      ROS    A ten system review of constitutional, cardiovascular, respiratory, musculoskeletal, endocrine, skin, SHEENT, genitourinary, psychiatric and neurologic systems was obtained and is unremarkable except as mentioned under HPI          EXAMINATION:     Visit Vitals  /78 (BP 1 Location: Right arm, BP Patient Position: Sitting, BP Cuff Size: Adult)   Pulse (!) 109   Temp 97.6 °F (36.4 °C) (Temporal)   Resp 16   Ht 5' 5\" (1.651 m)   Wt 141 lb (64 kg) SpO2 96%   BMI 23.46 kg/m²         General appearance: Patient is well-developed and well-nourished in no apparent distress and well groomed. Psych/mental health:  Affect: Appropriate    PHQ  3 most recent PHQ Screens 1/16/2023   Little interest or pleasure in doing things Not at all   Feeling down, depressed, irritable, or hopeless Not at all   Total Score PHQ 2 0   Trouble falling or staying asleep, or sleeping too much -   Feeling tired or having little energy -   Poor appetite, weight loss, or overeating -   Feeling bad about yourself - or that you are a failure or have let yourself or your family down -   Trouble concentrating on things such as school, work, reading, or watching TV -   Moving or speaking so slowly that other people could have noticed; or the opposite being so fidgety that others notice -   Thoughts of being better off dead, or hurting yourself in some way -   PHQ 9 Score -   How difficult have these problems made it for you to do your work, take care of your home and get along with others -       HEENT:   Normocephalic  With evidence of trauma: No  Full range of motion head neck: Yes  Tenderness to palpation of the head neck region: No      Cardiovascular:     Extremities warm to touch: Yes  Extremity swelling: No  Discoloration: No  Evidence of PVD: No    Respiratory:   Dyspnea on exertion: No   Abnormal effort on casual observation: No   Use of portable oxygen: No   Evidence of cyanosis: No     Musculoskeletal:   Evidence of significant bone deformities: No   Spinal curvature: No     Integumentary:    Obvious bruising: No   Lacerations or discoloration on casual observation: No       Neurological Examination:   Mental Status:        MMSE  No flowsheet data found.      Formal testing was not completed    Patient has good insight into her problems  Able to follow commands has good eye contact reduce facial animation  Spontaneously contribute to the conversation but more in general terms    Cranial Nerves:    Grossly intact    Motor:   Normal bulk  No tremor appreciated on today's exam  No abnormal movements appreciated on today's exam  Moves extremities spontaneously and with purpose        Sensation: Intact to light touch    Coordination/Cerebellar:   Grossly intact    Gait: Ambulates independently with normal gait and station    Reflexes: Not tested    Fall risk assessment  Fall Risk Assessment, last 12 mths 1/16/2023   Able to walk? Yes   Fall in past 12 months? 1   Do you feel unsteady? 1   Are you worried about falling 1   Is TUG test greater than 12 seconds? 0   Is the gait abnormal? 1   Number of falls in past 12 months 1   Fall with injury? 0         Follow-up and Dispositions    Return for In office appointment, Next available.              Prakash Mckenna MS, ANP-BC, [facility]

## 2023-01-16 NOTE — ASSESSMENT & PLAN NOTE
Seems to be worsening  We will recheck neuropsych testing  Start Zoloft 50 mg/day might need to increase that over time

## 2023-01-16 NOTE — ASSESSMENT & PLAN NOTE
Seems to have escalated with the use of the amitriptyline  Discontinue amitriptyline  Increase free water  Start Zoloft 50 mg/day    Depending on how she does we may need to do ANS testing and check Holter monitor

## 2023-01-16 NOTE — PROGRESS NOTES
Chief Complaint   Patient presents with    Follow-up     Patient states that she has to stand for minute before she moves and daughter states that she gets dizzy when she is walking out of nowhere. Having memory issues.

## 2023-01-16 NOTE — PATIENT INSTRUCTIONS
As per discussion    Discontinue the amitriptyline. I do think it is compounding issues related to your dizziness and your memory issues    Start Zoloft 50 mg/day    Only use the Antivert as needed and other name for Antivert as meclizine    Increase your free water. You should be drinking eight 8 ounce glasses per day. If you like for us then get the [employer] or sparkling water and limit your intake of Pepsi or rashmi to no more than 8 ounces per day    Referral has been made to Dr. Toribio Ramos for neuropsychiatric evaluation related to cognitive concerns. His  will call you to set up an appointment. However if you have not heard from the office in about a week you can contact their office at [phone]       Office Policies      Appointments  Please make sure that you arrive for your next appointment at least 15 minutes prior to your appointment time. If for some reason you are going to be late please notify the office to determine if you need to be rescheduled or we can adjust your appointment time      Phone calls/patient messages:  Please allow up to 24 hours for someone in the office to contact you about your call or message. Be mindful your provider may be out of the office or your message may require further review. We encourage you to use Phonetime for your messages as this is a faster, more efficient way to communicate with our office    Medication Refills:  Prescription medications require up to 48 business hours to process. We encourage you to use Ph[employer]etime for your refills. For controlled medications: Please allow up to 72 business hours to process. Certain medications may require you to  a written prescription at our office. NO narcotic/controlled medications will be prescribed after 4pm Monday through Friday or on weekends    Form/Paperwork Completion:  We ask that you allow 7-14 business days. You may also download your forms to Phonetime to have your doctor print off.

## 2023-01-17 ENCOUNTER — VIRTUAL VISIT (OUTPATIENT)
Dept: INTERNAL MEDICINE CLINIC | Age: 73
End: 2023-01-17
Payer: MEDICARE

## 2023-01-17 DIAGNOSIS — F32.A ANXIETY AND DEPRESSION: ICD-10-CM

## 2023-01-17 DIAGNOSIS — G43.109 MIGRAINE WITH AURA AND WITHOUT STATUS MIGRAINOSUS, NOT INTRACTABLE: ICD-10-CM

## 2023-01-17 DIAGNOSIS — F41.9 ANXIETY AND DEPRESSION: ICD-10-CM

## 2023-01-17 DIAGNOSIS — D75.89 MACROCYTOSIS: Primary | ICD-10-CM

## 2023-01-17 DIAGNOSIS — R53.83 FATIGUE, UNSPECIFIED TYPE: ICD-10-CM

## 2023-01-17 PROCEDURE — 3017F COLORECTAL CA SCREEN DOC REV: CPT | Performed by: STUDENT IN AN ORGANIZED HEALTH CARE EDUCATION/TRAINING PROGRAM

## 2023-01-17 PROCEDURE — 1101F PT FALLS ASSESS-DOCD LE1/YR: CPT | Performed by: STUDENT IN AN ORGANIZED HEALTH CARE EDUCATION/TRAINING PROGRAM

## 2023-01-17 PROCEDURE — G9717 DOC PT DX DEP/BP F/U NT REQ: HCPCS | Performed by: STUDENT IN AN ORGANIZED HEALTH CARE EDUCATION/TRAINING PROGRAM

## 2023-01-17 PROCEDURE — G8399 PT W/DXA RESULTS DOCUMENT: HCPCS | Performed by: STUDENT IN AN ORGANIZED HEALTH CARE EDUCATION/TRAINING PROGRAM

## 2023-01-17 PROCEDURE — 99214 OFFICE O/P EST MOD 30 MIN: CPT | Performed by: STUDENT IN AN ORGANIZED HEALTH CARE EDUCATION/TRAINING PROGRAM

## 2023-01-17 PROCEDURE — G8420 CALC BMI NORM PARAMETERS: HCPCS | Performed by: STUDENT IN AN ORGANIZED HEALTH CARE EDUCATION/TRAINING PROGRAM

## 2023-01-17 PROCEDURE — 1090F PRES/ABSN URINE INCON ASSESS: CPT | Performed by: STUDENT IN AN ORGANIZED HEALTH CARE EDUCATION/TRAINING PROGRAM

## 2023-01-17 PROCEDURE — G8536 NO DOC ELDER MAL SCRN: HCPCS | Performed by: STUDENT IN AN ORGANIZED HEALTH CARE EDUCATION/TRAINING PROGRAM

## 2023-01-17 PROCEDURE — G9899 SCRN MAM PERF RSLTS DOC: HCPCS | Performed by: STUDENT IN AN ORGANIZED HEALTH CARE EDUCATION/TRAINING PROGRAM

## 2023-01-17 PROCEDURE — G8427 DOCREV CUR MEDS BY ELIG CLIN: HCPCS | Performed by: STUDENT IN AN ORGANIZED HEALTH CARE EDUCATION/TRAINING PROGRAM

## 2023-01-17 NOTE — PROGRESS NOTES
Good Help to Those in [facility]   Internal Medicine  [address] [facility] [address], [address] [facility]  [address]  [place], [address]  [address] [place] [address], who was evaluated through a synchronous (real-time) audio-video encounter, and/or her healthcare decision maker, is aware that it is a billable service, which includes applicable co-pays, with coverage as determined by her insurance carrier. She provided verbal consent to proceed and patient identification was verified. This visit was conducted pursuant to the emergency declaration under the [facility], [facility] waiver authority and the [employer] [facility] General Act. A caregiver was present when appropriate. Ability to conduct physical exam was limited. The patient was located at: Home: [address] [place] [address]  The provider was located at: Facility ([facility] [place]): [place] [address]    --Kyra Nagel MD on 1/17/2023 at 4:49 PM        Primary Care Visit Note    Assessment/Plan:   Diagnoses and all orders for this visit:    1. Macrocytosis  2. Fatigue, unspecified type  - fatigue has been improved recently  -     VITAMIN B12 & FOLATE; Future  -     METHYLMALONIC ACID; Future  - continue oral vitamin B for now, if remains low and MMA elevated then will plan to start B12 injections. Homocysteine flagged as not covered by insurance based on current diagnoses so will hold off for now. 3. Migraine with aura and without status migrainosus, not intractable  - mild improvement with amitriptyline but unable to tolerate due to drowsiness  - continue tylenol PRN for now as has been controlling it    4. Anxiety and depression  - not improved with low dose amitriptyline and unable to tolerate due to drowsiness.    - started on zoloft by neurology  - pt planning to follow up with neuro and  Sampson Lighter for continued titration      Gary Mireles MD    CC:     Chief Complaint   Patient presents with    Follow-up     One month follow-up Medication check. HPI:     Kelton Prader is a 74 y.o. female who presents for evaluation of fatigue, headaches and depression    Pt thinks her symptoms have improved since the last visit. Currently does not have any feelings of fatigue. Mood has improved but just started zoloft last night. She has not had any serious headaches since last visit and they have been relatively well controlled with tylenol. ROS:   All 10 point review of systems negative except those mentioned in the HPI. Past Medical History: Active Ambulatory Problems     Diagnosis Date Noted    Obesity (BMI 30-39.9) 06/26/2019    Other insomnia 06/26/2019    Rheumatoid arthritis ([address] 12/06/2019    NHL (non-Hodgkin's lymphoma) ([address] 12/06/2019    Kidney transplant recipient 12/06/2019    Chronic renal disease, stage III 05/27/2022    Acute hypoxemic respiratory failure ([address] 01/16/2023    COPD exacerbation ([address] 01/16/2023    Costochondral chest pain 01/16/2023    Dehydration 01/16/2023    Malaise 01/16/2023    Nausea & vomiting 01/16/2023    Tachycardia 01/16/2023    Urinary tract infection 01/16/2023    Memory disturbance 01/16/2023    Anxiety and depression 01/16/2023    Dizzy spells 01/16/2023    Vitamin B12 deficiency neuropathy ([address] 01/16/2023     Resolved Ambulatory Problems     Diagnosis Date Noted    No Resolved Ambulatory Problems     Past Medical History:   Diagnosis Date    Cancer ([address]     Chronic kidney disease     Memory loss 06/2022          Current Medications:     Current Outpatient Medications:     acetaminophen (TYLENOL) 500 mg tablet, TAKE 2 TABLETS BY MOUTH EVERY 6 HOURS AS NEEDED FOR PAIN, Disp: , Rfl:     sertraline (ZOLOFT) 50 mg tablet, Take 1 Tablet by mouth daily.  Indications: anxiousness associated with depression, Disp: 90 Tablet, Rfl: 1 meclizine (ANTIVERT) 12.5 mg tablet, Take 1 Tablet by mouth three (3) times daily as needed for Dizziness for up to 10 days. , Disp: 30 Tablet, Rfl: 0    cyanocobalamin, vitamin B-12, (VITAMIN B12 PO), Take  by mouth., Disp: , Rfl:     calcium carbonate (CALTREX) 600 mg calcium (1,500 mg) tablet, Take 600 mg by mouth two (2) times a day., Disp: , Rfl:     cholecalciferol, vitamin D3, (VITAMIN D3 PO), Take  by mouth., Disp: , Rfl:     zolpidem (AMBIEN) 5 mg tablet, TAKE 1 TABLET BY MOUTH EVERY DAY NIGHTLY FOR SLEEP, Disp: 30 Tablet, Rfl: 5    predniSONE (DELTASONE) 5 mg tablet, TAKE 1 TABLET BY MOUTH ONCE DAILY, Disp: 90 Tablet, Rfl: 3    sodium bicarbonate 650 mg tablet, TAKE 1 TABLET BY MOUTH FOUR TIMES A DAY, Disp: 360 Tablet, Rfl: 3    cycloSPORINE (SandIMMUNE) 25 mg capsule, Take 3 Capsules by mouth two (2) times a day.  Dx code: Z80.0, Disp: 540 Capsule, Rfl: 3    ondansetron (ZOFRAN ODT) 4 mg disintegrating tablet, Take 1 Tablet by mouth every eight (8) hours as needed for Nausea or Vomiting., Disp: 30 Tablet, Rfl: 0      Past Surgical History:     Past Surgical History:   Procedure Laterality Date    HX GYN      hysterectomy     HX TRANSPLANT  04/03/1997    kidney         Family History:     Family History   Problem Relation Age of Onset    Diabetes Mother     No Known Problems Father          Social History:     Social History     Socioeconomic History    Marital status:      Spouse name: Not on file    Number of children: Not on file    Years of education: Not on file    Highest education level: Not on file   Occupational History    Not on file   Tobacco Use    Smoking status: Never    Smokeless tobacco: Never   Vaping Use    Vaping Use: Never used   Substance and Sexual Activity    Alcohol use: Not Currently    Drug use: Never    Sexual activity: Not Currently   Other Topics Concern    Not on file   Social History Narrative    Not on file     Social Determinants of Health     Financial Resource Strain: Low Risk     Difficulty of Paying Living Expenses: Not very hard   Food Insecurity: No Food Insecurity    Worried About Running Out of Food in the Last Year: Never true    Ran Out of Food in the Last Year: Never true   Transportation Needs: No Transportation Needs    Lack of Transportation (Medical): No    Lack of Transportation (Non-Medical): No   Physical Activity: Inactive    Days of Exercise per Week: 0 days    Minutes of Exercise per Session: 0 min   Stress: No Stress Concern Present    Feeling of Stress : Not at all   Social Connections: Socially Isolated    Frequency of Communication with Friends and Family: Once a week    Frequency of Social Gatherings with Friends and Family: Once a week    Attends Christianity Services: Never    Active Member of Clubs or Organizations: No    Attends Club or Organization Meetings: Never    Marital Status:    Intimate Partner Violence: Not At Risk    Fear of Current or Ex-Partner: No    Emotionally Abused: No    Physically Abused: No    Sexually Abused: No   Housing Stability: Low Risk     Unable to Pay for Housing in the Last Year: No    Number of [place] in the Last Year: 1    Unstable Housing in the Last Year: No            There were no vitals taken for this visit.     Physical Exam:   General - Well appearing  HEENT - eomi, no conjunctivitis, normal external ears and nose, missing teeth  Neck - normal appearing neck  Pulm - speaking in full sentences, no respiratory distress  Neuro-  Alert and oriented, No obvious focal deficits  Psych - normal mood and affect      Labs/Imaging:     Labs and imaging reviewed by me and significant for:    Lab Results   Component Value Date/Time    WBC 8.0 12/20/2022 02:49 PM    HGB 15.0 12/20/2022 02:49 PM    HCT 46.6 12/20/2022 02:49 PM    PLATELET 960 52/51/7764 02:49 PM    .1 (H) 12/20/2022 02:49 PM     Lab Results   Component Value Date/Time    Sodium 139 12/20/2022 02:49 PM    Potassium 4.4 12/20/2022 02:49 PM    Chloride 106 12/20/2022 02:49 PM    CO2 26 12/20/2022 02:49 PM    Anion gap 7 12/20/2022 02:49 PM    Glucose 139 (H) 12/20/2022 02:49 PM    BUN 18 12/20/2022 02:49 PM    Creatinine 1.30 (H) 12/20/2022 02:49 PM    BUN/Creatinine ratio 14 12/20/2022 02:49 PM    GFR est AA >60 02/03/2022 12:06 PM    GFR est non-AA 59 (L) 02/03/2022 12:06 PM    Calcium 9.4 12/20/2022 02:49 PM    Bilirubin, total 0.8 12/20/2022 02:49 PM    Alk.  phosphatase 134 (H) 12/20/2022 02:49 PM    Protein, total 6.4 12/20/2022 02:49 PM    Albumin 3.7 12/20/2022 02:49 PM    Globulin 2.7 12/20/2022 02:49 PM    A-G Ratio 1.4 12/20/2022 02:49 PM    ALT (SGPT) 28 12/20/2022 02:49 PM    AST (SGOT) 30 12/20/2022 02:49 PM     Lab Results   Component Value Date/Time    Hemoglobin A1c 4.9 01/28/2020 08:07 AM

## 2023-01-17 NOTE — PROGRESS NOTES
Chief Complaint   Patient presents with    Follow-up     One month follow-up. 1. \"Have you been to the ER, urgent care clinic since your last visit? Hospitalized since your last visit? \" No    2. \"Have you seen or consulted any other health care providers outside of the [address] [place] [address] since your last visit? \"     3. For patients aged 39-70: Has the patient had a colonoscopy / FIT/ Cologuard? No      If the patient is female:    4. For patients aged 41-77: Has the patient had a mammogram within the past 2 years? Yes - no Care Gap present      5. For patients aged 21-65: Has the patient had a pap smear?  NA - based on age or sex

## 2023-02-08 ENCOUNTER — OFFICE VISIT (OUTPATIENT)
Dept: INTERNAL MEDICINE CLINIC | Age: 73
End: 2023-02-08
Payer: MEDICARE

## 2023-02-08 VITALS
OXYGEN SATURATION: 98 % | BODY MASS INDEX: 24.43 KG/M2 | HEIGHT: 65 IN | DIASTOLIC BLOOD PRESSURE: 84 MMHG | WEIGHT: 146.6 LBS | HEART RATE: 103 BPM | SYSTOLIC BLOOD PRESSURE: 127 MMHG | TEMPERATURE: 98.5 F | RESPIRATION RATE: 14 BRPM

## 2023-02-08 DIAGNOSIS — N18.31 STAGE 3A CHRONIC KIDNEY DISEASE (HCC): ICD-10-CM

## 2023-02-08 DIAGNOSIS — F32.A ANXIETY AND DEPRESSION: Primary | ICD-10-CM

## 2023-02-08 DIAGNOSIS — F41.9 ANXIETY AND DEPRESSION: Primary | ICD-10-CM

## 2023-02-08 DIAGNOSIS — Z12.11 SCREEN FOR COLON CANCER: ICD-10-CM

## 2023-02-08 DIAGNOSIS — C85.91 NON-HODGKIN LYMPHOMA OF LYMPH NODES OF NECK, UNSPECIFIED NON-HODGKIN LYMPHOMA TYPE (HCC): ICD-10-CM

## 2023-02-08 DIAGNOSIS — F32.1 CURRENT MODERATE EPISODE OF MAJOR DEPRESSIVE DISORDER WITHOUT PRIOR EPISODE (HCC): ICD-10-CM

## 2023-02-08 DIAGNOSIS — M06.9 RHEUMATOID ARTHRITIS INVOLVING SHOULDER, UNSPECIFIED LATERALITY, UNSPECIFIED WHETHER RHEUMATOID FACTOR PRESENT (HCC): ICD-10-CM

## 2023-02-08 DIAGNOSIS — Z94.0 KIDNEY TRANSPLANT RECIPIENT: ICD-10-CM

## 2023-02-08 DIAGNOSIS — D69.6 THROMBOCYTOPENIA (HCC): ICD-10-CM

## 2023-02-08 PROBLEM — J44.1 COPD EXACERBATION (HCC): Status: RESOLVED | Noted: 2023-01-16 | Resolved: 2023-02-08

## 2023-02-08 PROCEDURE — 1090F PRES/ABSN URINE INCON ASSESS: CPT | Performed by: INTERNAL MEDICINE

## 2023-02-08 PROCEDURE — G8420 CALC BMI NORM PARAMETERS: HCPCS | Performed by: INTERNAL MEDICINE

## 2023-02-08 PROCEDURE — G8427 DOCREV CUR MEDS BY ELIG CLIN: HCPCS | Performed by: INTERNAL MEDICINE

## 2023-02-08 PROCEDURE — 99213 OFFICE O/P EST LOW 20 MIN: CPT | Performed by: INTERNAL MEDICINE

## 2023-02-08 PROCEDURE — G8536 NO DOC ELDER MAL SCRN: HCPCS | Performed by: INTERNAL MEDICINE

## 2023-02-08 PROCEDURE — G9899 SCRN MAM PERF RSLTS DOC: HCPCS | Performed by: INTERNAL MEDICINE

## 2023-02-08 PROCEDURE — G8399 PT W/DXA RESULTS DOCUMENT: HCPCS | Performed by: INTERNAL MEDICINE

## 2023-02-08 PROCEDURE — G9717 DOC PT DX DEP/BP F/U NT REQ: HCPCS | Performed by: INTERNAL MEDICINE

## 2023-02-08 PROCEDURE — 1101F PT FALLS ASSESS-DOCD LE1/YR: CPT | Performed by: INTERNAL MEDICINE

## 2023-02-08 PROCEDURE — 3017F COLORECTAL CA SCREEN DOC REV: CPT | Performed by: INTERNAL MEDICINE

## 2023-02-08 RX ORDER — AMITRIPTYLINE HYDROCHLORIDE 50 MG/1
50 TABLET, FILM COATED ORAL
COMMUNITY
Start: 2023-01-16 | End: 2023-02-08 | Stop reason: SINTOL

## 2023-02-08 NOTE — PROGRESS NOTES
1. \"Have you been to the ER, urgent care clinic since your last visit? Hospitalized since your last visit? \" No    2. \"Have you seen or consulted any other health care providers outside of the [address] [place] [address] since your last visit? \" No     3. For patients aged 39-70: Has the patient had a colonoscopy / FIT/ Cologuard? Yes - no Care Gap present      If the patient is female:    4. For patients aged 41-77: Has the patient had a mammogram within the past 2 years? Yes - no Care Gap present      5. For patients aged 21-65: Has the patient had a pap smear?  NA - based on age or sex

## 2023-02-08 NOTE — PROGRESS NOTES
Maximino Jenkins is a 74 y.o. female who presents for evaluation of routine follow up for depression. Last seen by me aug 9, 2022 in [place]. Saw dr Destin Jose dec 30 for depression, was started on elavil then, but it was too sedating. Saw dr Destin Jose again in [place] [address] on jan 17, and was switched to zoloft, which is working well. She feels good, has no complaints. Daughter with her today. Heading to outlets in [place] after today's visit.       ROS:  Constitutional: negative for fevers, chills, anorexia and weight loss  Eyes:   negative for visual disturbance and irritation  ENT:   negative for tinnitus,sore throat,nasal congestion,ear pain,hoarseness  Respiratory:  negative for cough, hemoptysis, dyspnea,wheezing  CV:   negative for chest pain, palpitations, lower extremity edema  GI:   negative for nausea, vomiting, diarrhea, abdominal pain,melena  Genitourinary: negative for frequency, dysuria and hematuria  Musculoskel: negative for myalgias, arthralgias, back pain, muscle weakness, joint pain  Neurological:  negative for headaches, dizziness, focal weakness, numbness  Psychiatric:     Negative for depression or anxiety--doing well      Past Medical History:   Diagnosis Date    Cancer ([address]     throat    Chronic kidney disease     Memory loss 06/2022       Past Surgical History:   Procedure Laterality Date    HX GYN      hysterectomy     HX TRANSPLANT  04/03/1997    kidney       Family History   Problem Relation Age of Onset    Diabetes Mother     No Known Problems Father        Social History     Socioeconomic History    Marital status:      Spouse name: Not on file    Number of children: Not on file    Years of education: Not on file    Highest education level: Not on file   Occupational History    Not on file   Tobacco Use    Smoking status: Never    Smokeless tobacco: Never   Vaping Use    Vaping Use: Never used   Substance and Sexual Activity    Alcohol use: Not Currently    Drug use: Never    Sexual activity: Not Currently   Other Topics Concern    Not on file   Social History Narrative    Not on file     Social Determinants of Health     Financial Resource Strain: Low Risk     Difficulty of Paying Living Expenses: Not very hard   Food Insecurity: No Food Insecurity    Worried About Running Out of Food in the Last Year: Never true    Ran Out of Food in the Last Year: Never true   Transportation Needs: No Transportation Needs    Lack of Transportation (Medical): No    Lack of Transportation (Non-Medical): No   Physical Activity: Inactive    Days of Exercise per Week: 0 days    Minutes of Exercise per Session: 0 min   Stress: No Stress Concern Present    Feeling of Stress : Not at all   Social Connections: Socially Isolated    Frequency of Communication with Friends and Family: Once a week    Frequency of Social Gatherings with Friends and Family: Once a week    Attends Orthodox Services: Never    Active Member of Clubs or Organizations: No    Attends Club or Organization Meetings: Never    Marital Status:    Intimate Partner Violence: Not At Risk    Fear of Current or Ex-Partner: No    Emotionally Abused: No    Physically Abused: No    Sexually Abused: No   Housing Stability: Low Risk     Unable to Pay for Housing in the Last Year: No    Number of Places Lived in the Last Year: 1    Unstable Housing in the Last Year: No          Visit Vitals  /84 (BP 1 Location: Left upper arm, BP Patient Position: Sitting)   Pulse (!) 103   Temp 98.5 °F (36.9 °C) (Temporal)   Resp 14   Ht 5' 5\" (1.651 m)   Wt 146 lb 9.6 oz (66.5 kg)   SpO2 98%   BMI 24.40 kg/m²       Physical Examination:   General - Well appearing female.   Missing some teeth   HEENT - PERRL, TM no erythema/opacification, normal nasal turbinates, no oropharyngeal erythema or exudate, MMM  Neck - supple, no bruits, no thyroidomegaly, no lymphadenopathy  Pulm - clear to auscultation bilaterally  Cardio - RRR, normal S1 S2, no murmur  Abd - soft, nontender, no masses, no HSM  Extrem - no edema, +2 distal pulses  Neuro-  No focal deficits, CN intact     Assessment/Plan:     JEFFREY with depression--much improved with zoloft  MCI--has appt upcoming with neurology and then neuropsychology testing  Hx kidney transplant recipient--on prednisone, sandimmune  Ckd 3--on bicarbonate  Insomnia--prn ambien helps  Hx RA--not on any treatment x tylenol  Screen colon cancer--cologuard ordered    Rtc 6 months for [facility]        [employer] III, DO

## 2023-02-15 PROBLEM — N39.0 URINARY TRACT INFECTION: Status: RESOLVED | Noted: 2023-01-16 | Resolved: 2023-02-15

## 2023-02-15 PROBLEM — E86.0 DEHYDRATION: Status: RESOLVED | Noted: 2023-01-16 | Resolved: 2023-02-15

## 2023-03-30 ENCOUNTER — PATIENT OUTREACH (OUTPATIENT)
Dept: CASE MANAGEMENT | Age: 73
End: 2023-03-30

## 2023-04-03 NOTE — PROGRESS NOTES
Ambulatory Care Management Note      Date/Time:  4/3/2023 10:42 AM    Patient Current Location: [place]     This patient was received as a referral from [employer]. Top Challenges reviewed with the provider     N/A       Ambulatory  contacted patient for discussion and case management services. Summary of patients top problems:   Dizziness: Meclizine made dizziness worse. Since she started Zoloft recently, the dizziness has decreased. Headaches:  Pt still c/o of HA's once in while, but is doing better. Patient's challenges to self management identified:   functional cognitive ability, multi health system providers, and polypharmacy      Medication Management:  good adherence and good understanding; no report of S/E    Advance Care Planning:                            (ACP note updated 4/03/23)  Does patient have an Advance Directive:  currently not on file; education provided via [employer] message sent today    [employer], Referrals, and Durable Medical Equipment: BP cuff, scale    PCP/Specialist follow up:   Future Appointments   Date Time Provider Yoseph Carter   7/14/2023  2:00 PM Cleve Habermann NEUM BS AMB   8/23/2023 10:00 AM Anayeli Boykin BS AMB   [facility]   10/16/2023 11:30 AM Fernandez Lemos ANP-C NEUM BS AMB   2/15/2024 11:30 AM Fernandez Lemos ANP-C NEUM BS AMB   6/17/2024 11:30 AM Fernandez Lemos ANP-C NEUM BS AMB   10/17/2024 11:30 AM Fernandez Lemos ANP-C NEUM BS AMB           Goals Addressed    Pt's  dtr/CG, Nikhil Rossi, reports pt has no jason concerns or needs they would like to work on at this time. Pt is doing much better now w/dizziness and YEAGER's. Nikhil Rossi reports meclizine made pt's dizziness worse so she was placed on Zoloft and is responding well. Pt uses no DME for ambulation, has had no falls and is independent w/all ADL's.              Patient has graduated from the Complex Case Management  program on 4/3/2023. Patient/family has the ability to self-manage at this time. No further Ambulatory Care Manager follow up scheduled. Patient verbalized understanding of all information discussed.  Patient has this Ambulatory Care Manager's contact information for any further questions, concerns, or needs.   Ruben Em

## 2023-04-03 NOTE — ACP (ADVANCE CARE PLANNING)
Advance Care Planning:   Does patient have an Advance Directive:  currently not on file; education provided via [employer] message sent 4/03/23.    Parmjit Brunner

## 2023-04-22 DIAGNOSIS — Z12.31 SCREENING MAMMOGRAM FOR HIGH-RISK PATIENT: Primary | ICD-10-CM

## 2023-06-24 DIAGNOSIS — G47.00 INSOMNIA, UNSPECIFIED: ICD-10-CM

## 2023-06-25 NOTE — TELEPHONE ENCOUNTER
RX clarified with pharmacist. I have personally seen and examined this patient.  I have fully participated in the care of this patient. I have reviewed all pertinent clinical information, including history, physical exam, plan and the Resident’s note and agree except as noted. There are no Wet Read(s) to document.

## 2023-06-26 RX ORDER — ZOLPIDEM TARTRATE 5 MG/1
TABLET ORAL
Qty: 30 TABLET | Refills: 5 | Status: SHIPPED | OUTPATIENT
Start: 2023-06-26 | End: 2023-11-23

## 2023-06-28 DIAGNOSIS — F41.9 ANXIETY DISORDER, UNSPECIFIED: ICD-10-CM

## 2023-07-14 ENCOUNTER — OFFICE VISIT (OUTPATIENT)
Age: 73
End: 2023-07-14
Payer: MEDICARE

## 2023-07-14 VITALS
RESPIRATION RATE: 18 BRPM | SYSTOLIC BLOOD PRESSURE: 110 MMHG | WEIGHT: 145.8 LBS | HEIGHT: 65 IN | OXYGEN SATURATION: 100 % | BODY MASS INDEX: 24.29 KG/M2 | HEART RATE: 88 BPM | DIASTOLIC BLOOD PRESSURE: 78 MMHG | TEMPERATURE: 98 F

## 2023-07-14 DIAGNOSIS — R41.3 MEMORY DISTURBANCE: Primary | ICD-10-CM

## 2023-07-14 DIAGNOSIS — F32.A ANXIETY AND DEPRESSION: ICD-10-CM

## 2023-07-14 DIAGNOSIS — F41.9 ANXIETY AND DEPRESSION: ICD-10-CM

## 2023-07-14 DIAGNOSIS — G63 VITAMIN B12 DEFICIENCY NEUROPATHY (HCC): ICD-10-CM

## 2023-07-14 DIAGNOSIS — E53.8 VITAMIN B12 DEFICIENCY NEUROPATHY (HCC): ICD-10-CM

## 2023-07-14 PROBLEM — J96.01 ACUTE HYPOXEMIC RESPIRATORY FAILURE (HCC): Status: RESOLVED | Noted: 2023-01-16 | Resolved: 2023-07-14

## 2023-07-14 PROCEDURE — 1090F PRES/ABSN URINE INCON ASSESS: CPT | Performed by: PSYCHIATRY & NEUROLOGY

## 2023-07-14 PROCEDURE — 1036F TOBACCO NON-USER: CPT | Performed by: PSYCHIATRY & NEUROLOGY

## 2023-07-14 PROCEDURE — 1123F ACP DISCUSS/DSCN MKR DOCD: CPT | Performed by: PSYCHIATRY & NEUROLOGY

## 2023-07-14 PROCEDURE — G8427 DOCREV CUR MEDS BY ELIG CLIN: HCPCS | Performed by: PSYCHIATRY & NEUROLOGY

## 2023-07-14 PROCEDURE — 3017F COLORECTAL CA SCREEN DOC REV: CPT | Performed by: PSYCHIATRY & NEUROLOGY

## 2023-07-14 PROCEDURE — G8399 PT W/DXA RESULTS DOCUMENT: HCPCS | Performed by: PSYCHIATRY & NEUROLOGY

## 2023-07-14 PROCEDURE — G8420 CALC BMI NORM PARAMETERS: HCPCS | Performed by: PSYCHIATRY & NEUROLOGY

## 2023-07-14 PROCEDURE — 99214 OFFICE O/P EST MOD 30 MIN: CPT | Performed by: PSYCHIATRY & NEUROLOGY

## 2023-07-14 ASSESSMENT — PATIENT HEALTH QUESTIONNAIRE - PHQ9
SUM OF ALL RESPONSES TO PHQ9 QUESTIONS 1 & 2: 0
SUM OF ALL RESPONSES TO PHQ QUESTIONS 1-9: 0
2. FEELING DOWN, DEPRESSED OR HOPELESS: 0
1. LITTLE INTEREST OR PLEASURE IN DOING THINGS: 0
SUM OF ALL RESPONSES TO PHQ QUESTIONS 1-9: 0

## 2023-07-14 NOTE — PROGRESS NOTES
[address]  In Office FOLLOW-UP VISIT         Natalee Vanegas is a 74 y.o. female who presents today for the following:  Chief Complaint   Patient presents with    Follow-up     Patient states that she has to stand for minute before she moves and daughter states that she gets dizzy when she is walking out of nowhere. Having memory issues. ASSESSMENT AND PLAN  1. Memory disturbance  Assessment & Plan:   Neuropsych testing is pending and scheduled for intake on 8/8/2023 with Dr. Sheridan Degree  Encouraged family to continue with behavioral interventions as they have been doing at this time  Once we have a more clear idea of pathophysiology we can then intervene appropriately    2. Anxiety and depression  Assessment & Plan:   Seemingly improved on Zoloft 50 mg daily for now we will continue on this at present dose  3. Vitamin B12 deficiency neuropathy ([address])  Assessment & Plan:   Symptoms improved on vitamin B12 1000 mg daily continue at this time          Patient and/or family was given time to ask questions and voice concerns. I believe all questions concerns were adequately addressed at this  office visit. Patient and/or family also verbalized agreement and understanding of the above-stated plan    Complex neurologic decision making secondary any or all of the following to include unclear etiology, and /or polypharmacy, and/or significant comorbid conditions, and/or use of high-risk medications which complicate the decision making process related to patient's neurologic diagnosis        ICD-10-CM    1. Memory disturbance  R41.3       2. Anxiety and depression  F41.9     F32. A       3. Vitamin B12 deficiency neuropathy ([address])  E53.8     G63                   HPI  Historical Data  Patient is known to the practice and was previously seen by multiple providers in the practice most recently Terence Leyva NP    Neurologic diagnosis  Memory disturbance   Neuropsych testing from 2021

## 2023-07-14 NOTE — ASSESSMENT & PLAN NOTE
Neuropsych testing is pending and scheduled for intake on 8/8/2023 with Dr. Sheridan Degree  Encouraged family to continue with behavioral interventions as they have been doing at this time  Once we have a more clear idea of pathophysiology we can then intervene appropriately

## 2023-07-14 NOTE — PATIENT INSTRUCTIONS
As per discussion    At this point I do not have any concrete answers for you we need to get the neuropsych testing completed which is coming up in August    Once we have that data from the testing we will be able to further direct intervention and therapy    But from everything that you are telling me I think you are all doing everything right but if she does not recall then just except where she is at at that moment    Good luck with your move to your new residence I hope everything goes smoothly        Office Policies    Phone calls/patient messages:  Please allow up to 24 hours for someone in the office to contact you about your call or message. Be mindful your provider may be out of the office or your message may require further review. We encourage you to use [employer] for your messages as this is a faster, more efficient way to communicate with our office    Medication Refills:  Prescription medications require up to 48 business hours to process. We encourage you to use [employer] for your refills. For controlled medications: Please allow up to 72 business hours to process. Certain medications may require you to  a written prescription at our office. NO narcotic/controlled medications will be prescribed after 4pm Monday through Friday or on weekends    Form/Paperwork Completion:  We ask that you allow 7-14 business days. You may also download your forms to [employer] to have your doctor print off.

## 2023-08-14 ENCOUNTER — OFFICE VISIT (OUTPATIENT)
Age: 73
End: 2023-08-14
Payer: MEDICARE

## 2023-08-14 VITALS
OXYGEN SATURATION: 99 % | RESPIRATION RATE: 16 BRPM | BODY MASS INDEX: 23.26 KG/M2 | WEIGHT: 139.6 LBS | DIASTOLIC BLOOD PRESSURE: 84 MMHG | HEART RATE: 88 BPM | HEIGHT: 65 IN | SYSTOLIC BLOOD PRESSURE: 138 MMHG | TEMPERATURE: 97.1 F

## 2023-08-14 DIAGNOSIS — Z91.81 AT HIGH RISK FOR FALLS: ICD-10-CM

## 2023-08-14 DIAGNOSIS — R42 DIZZINESS: Primary | ICD-10-CM

## 2023-08-14 PROCEDURE — G8420 CALC BMI NORM PARAMETERS: HCPCS | Performed by: INTERNAL MEDICINE

## 2023-08-14 PROCEDURE — 1036F TOBACCO NON-USER: CPT | Performed by: INTERNAL MEDICINE

## 2023-08-14 PROCEDURE — G8427 DOCREV CUR MEDS BY ELIG CLIN: HCPCS | Performed by: INTERNAL MEDICINE

## 2023-08-14 PROCEDURE — G8399 PT W/DXA RESULTS DOCUMENT: HCPCS | Performed by: INTERNAL MEDICINE

## 2023-08-14 PROCEDURE — 3017F COLORECTAL CA SCREEN DOC REV: CPT | Performed by: INTERNAL MEDICINE

## 2023-08-14 PROCEDURE — 99214 OFFICE O/P EST MOD 30 MIN: CPT | Performed by: INTERNAL MEDICINE

## 2023-08-14 PROCEDURE — 1123F ACP DISCUSS/DSCN MKR DOCD: CPT | Performed by: INTERNAL MEDICINE

## 2023-08-14 PROCEDURE — 1090F PRES/ABSN URINE INCON ASSESS: CPT | Performed by: INTERNAL MEDICINE

## 2023-08-14 RX ORDER — MECLIZINE HCL 12.5 MG/1
12.5 TABLET ORAL 3 TIMES DAILY PRN
Qty: 15 TABLET | Refills: 0 | Status: SHIPPED | OUTPATIENT
Start: 2023-08-14 | End: 2023-08-24

## 2023-08-14 SDOH — ECONOMIC STABILITY: FOOD INSECURITY: WITHIN THE PAST 12 MONTHS, YOU WORRIED THAT YOUR FOOD WOULD RUN OUT BEFORE YOU GOT MONEY TO BUY MORE.: NEVER TRUE

## 2023-08-14 SDOH — ECONOMIC STABILITY: FOOD INSECURITY: WITHIN THE PAST 12 MONTHS, THE FOOD YOU BOUGHT JUST DIDN'T LAST AND YOU DIDN'T HAVE MONEY TO GET MORE.: NEVER TRUE

## 2023-08-14 ASSESSMENT — PATIENT HEALTH QUESTIONNAIRE - PHQ9
2. FEELING DOWN, DEPRESSED OR HOPELESS: 0
SUM OF ALL RESPONSES TO PHQ QUESTIONS 1-9: 0
SUM OF ALL RESPONSES TO PHQ9 QUESTIONS 1 & 2: 0
SUM OF ALL RESPONSES TO PHQ QUESTIONS 1-9: 0
1. LITTLE INTEREST OR PLEASURE IN DOING THINGS: 0

## 2023-08-14 ASSESSMENT — SOCIAL DETERMINANTS OF HEALTH (SDOH): HOW HARD IS IT FOR YOU TO PAY FOR THE VERY BASICS LIKE FOOD, HOUSING, MEDICAL CARE, AND HEATING?: NOT HARD AT ALL

## 2023-08-14 NOTE — PROGRESS NOTES
Ms. Tracy Anaya is presenting to follow up     CC:  Dizziness (Off and on for the past month.  Last week it has been constant. )       HPI:    Ms. Tracy Anaya   is a 74 y.o. female with a hx of depression, cruz transplant on immunosuppression, RA presenting to discuss dizziness  Reports onset one week ago  Noted when turning head or changing gaze  Denies weakness  Denies vision changes  Denies sensation changes      Review of systems:  Constitutional: negative for fever, chills, weight loss, night sweats     10 systems reviewed and negative other then HPI     Past Medical History:   Diagnosis Date    Acute hypoxemic respiratory failure ([address]) 1/16/2023    Dec 2022 went to ED     Chronic kidney disease     Depression     GAURAV (generalized anxiety disorder)     Insomnia     MCI (mild cognitive impairment) with memory loss 06/2022    RA (rheumatoid arthritis) ([address])     Throat cancer [facility])         Past Surgical History:   Procedure Laterality Date    GYN      hysterectomy     TRANSPLANT  04/03/1997    kidney       Allergies   Allergen Reactions    Codeine Dizziness or Vertigo    Gabapentin Dizziness or Vertigo     syncope    Zolpidem Other (See Comments)     Sleepwalking    Adhesive Tape Rash       Current Outpatient Medications on File Prior to Visit   Medication Sig Dispense Refill    cyanocobalamin 1000 MCG tablet Take 1 tablet by mouth daily      sertraline (ZOLOFT) 50 MG tablet TAKE 1 TABLET BY MOUTH EVERY DAY 90 tablet 1    zolpidem (AMBIEN) 5 MG tablet TAKE 1 TABLET BY MOUTH EVERY DAY NIGHTLY FOR SLEEP 30 tablet 5    acetaminophen (TYLENOL) 500 MG tablet TAKE 2 TABLETS BY MOUTH EVERY 6 HOURS AS NEEDED FOR PAIN      calcium carbonate 1500 (600 Ca) MG TABS tablet Take 1 tablet by mouth 2 times daily      cycloSPORINE (SANDIMMUNE) 25 MG capsule Take 3 capsules by mouth 2 times daily      ondansetron (ZOFRAN-ODT) 4 MG disintegrating tablet Take 1 tablet by mouth every 8 hours as needed      predniSONE

## 2023-08-14 NOTE — PROGRESS NOTES
1. \"Have you been to the ER, urgent care clinic since your last visit? Hospitalized since your last visit? \" No    2. \"Have you seen or consulted any other health care providers outside of the [address] [place] [address] since your last visit? \" No     3. For patients aged 43-73: Has the patient had a colonoscopy / FIT/ Cologuard? No      If the patient is female:    4. For patients aged 43-66: Has the patient had a mammogram within the past 2 years? NA - based on age or sex      11. For patients aged 21-65: Has the patient had a pap smear?  NA - based on age or sex

## 2023-08-15 LAB
ALBUMIN SERPL-MCNC: 3.3 G/DL (ref 3.5–5)
ALBUMIN/GLOB SERPL: 1.1 (ref 1.1–2.2)
ALP SERPL-CCNC: 135 U/L (ref 45–117)
ALT SERPL-CCNC: 19 U/L (ref 12–78)
ANION GAP SERPL CALC-SCNC: 8 MMOL/L (ref 5–15)
AST SERPL-CCNC: 23 U/L (ref 15–37)
BASOPHILS # BLD: 0 K/UL (ref 0–0.1)
BASOPHILS NFR BLD: 0 % (ref 0–1)
BILIRUB SERPL-MCNC: 0.8 MG/DL (ref 0.2–1)
BUN SERPL-MCNC: 12 MG/DL (ref 6–20)
BUN/CREAT SERPL: 11 (ref 12–20)
CALCIUM SERPL-MCNC: 8.9 MG/DL (ref 8.5–10.1)
CHLORIDE SERPL-SCNC: 109 MMOL/L (ref 97–108)
CO2 SERPL-SCNC: 25 MMOL/L (ref 21–32)
CREAT SERPL-MCNC: 1.14 MG/DL (ref 0.55–1.02)
DIFFERENTIAL METHOD BLD: ABNORMAL
EOSINOPHIL # BLD: 0 K/UL (ref 0–0.4)
EOSINOPHIL NFR BLD: 0 % (ref 0–7)
ERYTHROCYTE [DISTWIDTH] IN BLOOD BY AUTOMATED COUNT: 14.3 % (ref 11.5–14.5)
GLOBULIN SER CALC-MCNC: 3.1 G/DL (ref 2–4)
GLUCOSE SERPL-MCNC: 113 MG/DL (ref 65–100)
HCT VFR BLD AUTO: 40.4 % (ref 35–47)
HGB BLD-MCNC: 13.2 G/DL (ref 11.5–16)
IMM GRANULOCYTES # BLD AUTO: 0 K/UL (ref 0–0.04)
IMM GRANULOCYTES NFR BLD AUTO: 0 % (ref 0–0.5)
LYMPHOCYTES # BLD: 1.3 K/UL (ref 0.8–3.5)
LYMPHOCYTES NFR BLD: 17 % (ref 12–49)
MCH RBC QN AUTO: 33.2 PG (ref 26–34)
MCHC RBC AUTO-ENTMCNC: 32.7 G/DL (ref 30–36.5)
MCV RBC AUTO: 101.8 FL (ref 80–99)
MONOCYTES # BLD: 0.5 K/UL (ref 0–1)
MONOCYTES NFR BLD: 6 % (ref 5–13)
NEUTS SEG # BLD: 5.8 K/UL (ref 1.8–8)
NEUTS SEG NFR BLD: 77 % (ref 32–75)
NRBC # BLD: 0 K/UL (ref 0–0.01)
NRBC BLD-RTO: 0 PER 100 WBC
PLATELET # BLD AUTO: 164 K/UL (ref 150–400)
PMV BLD AUTO: 11.8 FL (ref 8.9–12.9)
POTASSIUM SERPL-SCNC: 3.8 MMOL/L (ref 3.5–5.1)
PROT SERPL-MCNC: 6.4 G/DL (ref 6.4–8.2)
RBC # BLD AUTO: 3.97 M/UL (ref 3.8–5.2)
SODIUM SERPL-SCNC: 142 MMOL/L (ref 136–145)
WBC # BLD AUTO: 7.6 K/UL (ref 3.6–11)

## 2023-08-21 RX ORDER — PREDNISONE 5 MG/1
TABLET ORAL
Qty: 90 TABLET | Refills: 3 | Status: SHIPPED | OUTPATIENT
Start: 2023-08-21

## 2023-08-23 ENCOUNTER — OFFICE VISIT (OUTPATIENT)
Age: 73
End: 2023-08-23
Payer: MEDICARE

## 2023-08-23 ENCOUNTER — TELEPHONE (OUTPATIENT)
Age: 73
End: 2023-08-23

## 2023-08-23 DIAGNOSIS — R41.3 MEMORY LOSS: Primary | ICD-10-CM

## 2023-08-23 DIAGNOSIS — F43.21 ADJUSTMENT DISORDER WITH DEPRESSED MOOD: ICD-10-CM

## 2023-08-23 PROCEDURE — 90791 PSYCH DIAGNOSTIC EVALUATION: CPT | Performed by: CLINICAL NEUROPSYCHOLOGIST

## 2023-08-23 PROCEDURE — 1123F ACP DISCUSS/DSCN MKR DOCD: CPT | Performed by: CLINICAL NEUROPSYCHOLOGIST

## 2023-08-23 PROCEDURE — 90785 PSYTX COMPLEX INTERACTIVE: CPT | Performed by: CLINICAL NEUROPSYCHOLOGIST

## 2023-08-23 PROCEDURE — 1036F TOBACCO NON-USER: CPT | Performed by: CLINICAL NEUROPSYCHOLOGIST

## 2023-08-23 NOTE — PROGRESS NOTES
Intake Note      Patient Name: Chinyere Tarango  YOB: 1950    Age: 74 y.o. Date of Intake: 8/23/2023   Education: 10 Ethnicity White   Gender: Female Referring Provider: Allena Kocher, ANP     REASON FOR REFERRAL AND EVALUATION PROCEDURES:  Chinyere Tarango  was referred for evaluation by her Neurology Nurse Practitioner to assist in differential diagnosis and individualized treatment planning. she understood the rationale and procedures for evaluation, as well as the limits to confidentiality, and agreed to participate. she consented to have this report made available to her  treating providers through her  electronic medical records. History Sources: Patient, Relative (daughter), and Medical Record    HISTORY OF PRESENT ILLNESS:  The patient is a 75-year-old female with pertinent history noted for kidney transplant recipient, non-Hodgkin's lymphoma, other insomnia, stage III chronic renal disease, tachycardia, memory disturbance, anxiety and depression, dizzy spells, vitamin B12 deficiency, and moderate episode of major depressive disorder. She presented for clinical interview accompanied by her daughter who assisted with establishing history. According to the patient's daughter, more than 4 years ago, the patient experienced the insidious onset of gradually progressive declines in her cognitive functioning. Her declines were described to include forgetting recent conversations (e.g., \"we might have the same conversation 10 times within 20 minutes\") and forgetting where household objects are located (e.g., forgetting where dishes go in the kitchen and where her clothes are). The patient's daughter reported the patient's cognitive difficulties have progressed more rapidly in the past 6 months and much more so in the past 3 months. She described them to be consistent and denied noticing acute fluctuations in the patient's mental status.     Pertaining to the patient's psychiatric history, she was

## 2023-08-23 NOTE — TELEPHONE ENCOUNTER
Contacted [employer] no PA is needed for [id number], [id number], [id number], [id number], [id number].     Transaction ID: [id number]

## 2023-08-29 ENCOUNTER — OFFICE VISIT (OUTPATIENT)
Age: 73
End: 2023-08-29
Payer: MEDICARE

## 2023-08-29 VITALS
RESPIRATION RATE: 18 BRPM | WEIGHT: 143 LBS | TEMPERATURE: 97.5 F | DIASTOLIC BLOOD PRESSURE: 66 MMHG | HEIGHT: 65 IN | SYSTOLIC BLOOD PRESSURE: 85 MMHG | HEART RATE: 94 BPM | OXYGEN SATURATION: 97 % | BODY MASS INDEX: 23.82 KG/M2

## 2023-08-29 DIAGNOSIS — G93.40 ENCEPHALOPATHY: ICD-10-CM

## 2023-08-29 DIAGNOSIS — Z00.00 MEDICARE ANNUAL WELLNESS VISIT, SUBSEQUENT: Primary | ICD-10-CM

## 2023-08-29 DIAGNOSIS — Z94.0 HISTORY OF KIDNEY TRANSPLANT: ICD-10-CM

## 2023-08-29 DIAGNOSIS — Z91.81 AT HIGH RISK FOR FALLS: ICD-10-CM

## 2023-08-29 DIAGNOSIS — E78.2 MIXED HYPERLIPIDEMIA: ICD-10-CM

## 2023-08-29 DIAGNOSIS — N18.31 CHRONIC KIDNEY DISEASE, STAGE 3A (HCC): ICD-10-CM

## 2023-08-29 DIAGNOSIS — R55 SYNCOPE AND COLLAPSE: ICD-10-CM

## 2023-08-29 PROCEDURE — 1036F TOBACCO NON-USER: CPT | Performed by: INTERNAL MEDICINE

## 2023-08-29 PROCEDURE — G8427 DOCREV CUR MEDS BY ELIG CLIN: HCPCS | Performed by: INTERNAL MEDICINE

## 2023-08-29 PROCEDURE — G8399 PT W/DXA RESULTS DOCUMENT: HCPCS | Performed by: INTERNAL MEDICINE

## 2023-08-29 PROCEDURE — G8420 CALC BMI NORM PARAMETERS: HCPCS | Performed by: INTERNAL MEDICINE

## 2023-08-29 PROCEDURE — 99214 OFFICE O/P EST MOD 30 MIN: CPT | Performed by: INTERNAL MEDICINE

## 2023-08-29 PROCEDURE — G0439 PPPS, SUBSEQ VISIT: HCPCS | Performed by: INTERNAL MEDICINE

## 2023-08-29 PROCEDURE — 1123F ACP DISCUSS/DSCN MKR DOCD: CPT | Performed by: INTERNAL MEDICINE

## 2023-08-29 PROCEDURE — 3017F COLORECTAL CA SCREEN DOC REV: CPT | Performed by: INTERNAL MEDICINE

## 2023-08-29 PROCEDURE — 1090F PRES/ABSN URINE INCON ASSESS: CPT | Performed by: INTERNAL MEDICINE

## 2023-08-29 SDOH — ECONOMIC STABILITY: FOOD INSECURITY: WITHIN THE PAST 12 MONTHS, YOU WORRIED THAT YOUR FOOD WOULD RUN OUT BEFORE YOU GOT MONEY TO BUY MORE.: NEVER TRUE

## 2023-08-29 SDOH — ECONOMIC STABILITY: FOOD INSECURITY: WITHIN THE PAST 12 MONTHS, THE FOOD YOU BOUGHT JUST DIDN'T LAST AND YOU DIDN'T HAVE MONEY TO GET MORE.: NEVER TRUE

## 2023-08-29 SDOH — ECONOMIC STABILITY: HOUSING INSECURITY
IN THE LAST 12 MONTHS, WAS THERE A TIME WHEN YOU DID NOT HAVE A STEADY PLACE TO SLEEP OR SLEPT IN A SHELTER (INCLUDING NOW)?: NO

## 2023-08-29 SDOH — ECONOMIC STABILITY: INCOME INSECURITY: HOW HARD IS IT FOR YOU TO PAY FOR THE VERY BASICS LIKE FOOD, HOUSING, MEDICAL CARE, AND HEATING?: NOT HARD AT ALL

## 2023-08-29 ASSESSMENT — PATIENT HEALTH QUESTIONNAIRE - PHQ9
SUM OF ALL RESPONSES TO PHQ QUESTIONS 1-9: 0
10. IF YOU CHECKED OFF ANY PROBLEMS, HOW DIFFICULT HAVE THESE PROBLEMS MADE IT FOR YOU TO DO YOUR WORK, TAKE CARE OF THINGS AT HOME, OR GET ALONG WITH OTHER PEOPLE: 0
4. FEELING TIRED OR HAVING LITTLE ENERGY: 0
SUM OF ALL RESPONSES TO PHQ QUESTIONS 1-9: 0
1. LITTLE INTEREST OR PLEASURE IN DOING THINGS: 0
7. TROUBLE CONCENTRATING ON THINGS, SUCH AS READING THE NEWSPAPER OR WATCHING TELEVISION: 0
9. THOUGHTS THAT YOU WOULD BE BETTER OFF DEAD, OR OF HURTING YOURSELF: 0
2. FEELING DOWN, DEPRESSED OR HOPELESS: 0
SUM OF ALL RESPONSES TO PHQ QUESTIONS 1-9: 0
6. FEELING BAD ABOUT YOURSELF - OR THAT YOU ARE A FAILURE OR HAVE LET YOURSELF OR YOUR FAMILY DOWN: 0
SUM OF ALL RESPONSES TO PHQ9 QUESTIONS 1 & 2: 0
8. MOVING OR SPEAKING SO SLOWLY THAT OTHER PEOPLE COULD HAVE NOTICED. OR THE OPPOSITE, BEING SO FIGETY OR RESTLESS THAT YOU HAVE BEEN MOVING AROUND A LOT MORE THAN USUAL: 0
SUM OF ALL RESPONSES TO PHQ QUESTIONS 1-9: 0
5. POOR APPETITE OR OVEREATING: 0
3. TROUBLE FALLING OR STAYING ASLEEP: 0

## 2023-08-29 ASSESSMENT — LIFESTYLE VARIABLES
HOW OFTEN DO YOU HAVE A DRINK CONTAINING ALCOHOL: NEVER
HOW MANY STANDARD DRINKS CONTAINING ALCOHOL DO YOU HAVE ON A TYPICAL DAY: PATIENT DOES NOT DRINK

## 2023-08-30 ENCOUNTER — PROCEDURE VISIT (OUTPATIENT)
Age: 73
End: 2023-08-30
Payer: MEDICARE

## 2023-08-30 ENCOUNTER — NURSE ONLY (OUTPATIENT)
Age: 73
End: 2023-08-30

## 2023-08-30 DIAGNOSIS — N39.0 URINARY TRACT INFECTION WITHOUT HEMATURIA, SITE UNSPECIFIED: Primary | ICD-10-CM

## 2023-08-30 DIAGNOSIS — G31.83 MODERATE LEWY BODY DEMENTIA WITH PSYCHOTIC DISTURBANCE (HCC): Primary | ICD-10-CM

## 2023-08-30 DIAGNOSIS — F02.B2 MODERATE LEWY BODY DEMENTIA WITH PSYCHOTIC DISTURBANCE (HCC): Primary | ICD-10-CM

## 2023-08-30 LAB
ALBUMIN SERPL-MCNC: 3.7 G/DL (ref 3.5–5)
ALBUMIN/GLOB SERPL: 1.1 (ref 1.1–2.2)
ALP SERPL-CCNC: 138 U/L (ref 45–117)
ALT SERPL-CCNC: 25 U/L (ref 12–78)
ANION GAP SERPL CALC-SCNC: 10 MMOL/L (ref 5–15)
AST SERPL-CCNC: 27 U/L (ref 15–37)
BASOPHILS # BLD: 0 K/UL (ref 0–0.1)
BASOPHILS NFR BLD: 0 % (ref 0–1)
BILIRUB SERPL-MCNC: 1.3 MG/DL (ref 0.2–1)
BUN SERPL-MCNC: 12 MG/DL (ref 6–20)
BUN/CREAT SERPL: 10 (ref 12–20)
CALCIUM SERPL-MCNC: 8.6 MG/DL (ref 8.5–10.1)
CHLORIDE SERPL-SCNC: 102 MMOL/L (ref 97–108)
CHOLEST SERPL-MCNC: 122 MG/DL
CO2 SERPL-SCNC: 29 MMOL/L (ref 21–32)
CREAT SERPL-MCNC: 1.2 MG/DL (ref 0.55–1.02)
DIFFERENTIAL METHOD BLD: ABNORMAL
EOSINOPHIL # BLD: 0 K/UL (ref 0–0.4)
EOSINOPHIL NFR BLD: 0 % (ref 0–7)
ERYTHROCYTE [DISTWIDTH] IN BLOOD BY AUTOMATED COUNT: 14.4 % (ref 11.5–14.5)
GLOBULIN SER CALC-MCNC: 3.3 G/DL (ref 2–4)
GLUCOSE SERPL-MCNC: 85 MG/DL (ref 65–100)
HCT VFR BLD AUTO: 40.9 % (ref 35–47)
HDLC SERPL-MCNC: 70 MG/DL
HDLC SERPL: 1.7 (ref 0–5)
HGB BLD-MCNC: 13.6 G/DL (ref 11.5–16)
IMM GRANULOCYTES # BLD AUTO: 0.1 K/UL (ref 0–0.04)
IMM GRANULOCYTES NFR BLD AUTO: 1 % (ref 0–0.5)
LDLC SERPL CALC-MCNC: 34.2 MG/DL (ref 0–100)
LYMPHOCYTES # BLD: 1.6 K/UL (ref 0.8–3.5)
LYMPHOCYTES NFR BLD: 16 % (ref 12–49)
MCH RBC QN AUTO: 33.4 PG (ref 26–34)
MCHC RBC AUTO-ENTMCNC: 33.3 G/DL (ref 30–36.5)
MCV RBC AUTO: 100.5 FL (ref 80–99)
MONOCYTES # BLD: 0.8 K/UL (ref 0–1)
MONOCYTES NFR BLD: 8 % (ref 5–13)
NEUTS SEG # BLD: 7.6 K/UL (ref 1.8–8)
NEUTS SEG NFR BLD: 75 % (ref 32–75)
NRBC # BLD: 0 K/UL (ref 0–0.01)
NRBC BLD-RTO: 0 PER 100 WBC
PLATELET # BLD AUTO: 186 K/UL (ref 150–400)
PMV BLD AUTO: 12.3 FL (ref 8.9–12.9)
POTASSIUM SERPL-SCNC: 2.9 MMOL/L (ref 3.5–5.1)
PROT SERPL-MCNC: 7 G/DL (ref 6.4–8.2)
RBC # BLD AUTO: 4.07 M/UL (ref 3.8–5.2)
SODIUM SERPL-SCNC: 141 MMOL/L (ref 136–145)
TRIGL SERPL-MCNC: 89 MG/DL
TSH SERPL DL<=0.05 MIU/L-ACNC: 3.64 UIU/ML (ref 0.36–3.74)
VLDLC SERPL CALC-MCNC: 17.8 MG/DL
WBC # BLD AUTO: 10.1 K/UL (ref 3.6–11)

## 2023-08-30 PROCEDURE — 96138 PSYCL/NRPSYC TECH 1ST: CPT | Performed by: CLINICAL NEUROPSYCHOLOGIST

## 2023-08-30 PROCEDURE — 96139 PSYCL/NRPSYC TST TECH EA: CPT | Performed by: CLINICAL NEUROPSYCHOLOGIST

## 2023-08-30 PROCEDURE — 96136 PSYCL/NRPSYC TST PHY/QHP 1ST: CPT | Performed by: CLINICAL NEUROPSYCHOLOGIST

## 2023-08-30 RX ORDER — POTASSIUM CHLORIDE 750 MG/1
10 TABLET, EXTENDED RELEASE ORAL 2 TIMES DAILY
Qty: 60 TABLET | Refills: 5 | Status: SHIPPED | OUTPATIENT
Start: 2023-08-30

## 2023-08-30 NOTE — PROGRESS NOTES
PT dgt brought in specimen this am that was not able to be obtained yesterday. Huyen Chaves for culture. Specimen sent to lab for testing.

## 2023-08-31 ENCOUNTER — TELEPHONE (OUTPATIENT)
Age: 73
End: 2023-08-31

## 2023-08-31 NOTE — TELEPHONE ENCOUNTER
Patient's daughter, Jojo Lock states she is calling to request Medication be Prescribed for Muscle Spasms & Pain from fall on Sat. 8/26/23 that patient is having Symptoms in her Back & Neck. Please call to discuss & advise. Thank you    Patient seen earlier this [facility] at [facility] on 8/29/23 CPE//discussed fall also at [facility]      Pharmacy is [facility]Charter Alba on file.

## 2023-09-02 LAB
BACTERIA SPEC CULT: ABNORMAL
BACTERIA SPEC CULT: ABNORMAL
CC UR VC: ABNORMAL
SERVICE CMNT-IMP: ABNORMAL

## 2023-09-04 RX ORDER — NITROFURANTOIN 25; 75 MG/1; MG/1
100 CAPSULE ORAL 2 TIMES DAILY
Qty: 10 CAPSULE | Refills: 0 | Status: SHIPPED | OUTPATIENT
Start: 2023-09-04 | End: 2023-09-09

## 2023-09-05 ENCOUNTER — HOSPITAL ENCOUNTER (OUTPATIENT)
Facility: HOSPITAL | Age: 73
Discharge: HOME | End: 2023-09-08
Attending: INTERNAL MEDICINE
Payer: MEDICARE

## 2023-09-05 ENCOUNTER — TELEPHONE (OUTPATIENT)
Age: 73
End: 2023-09-05

## 2023-09-05 DIAGNOSIS — R29.6 RECURRENT FALLS: ICD-10-CM

## 2023-09-05 DIAGNOSIS — R41.3 MEMORY DISTURBANCE: ICD-10-CM

## 2023-09-05 DIAGNOSIS — Z91.81 AT HIGH RISK FOR FALLS: ICD-10-CM

## 2023-09-05 DIAGNOSIS — R42 DIZZY SPELLS: Primary | ICD-10-CM

## 2023-09-05 DIAGNOSIS — G93.40 ENCEPHALOPATHY: ICD-10-CM

## 2023-09-05 DIAGNOSIS — M06.9 RHEUMATOID ARTHRITIS OF OTHER SITE, UNSPECIFIED WHETHER RHEUMATOID FACTOR PRESENT (HCC): ICD-10-CM

## 2023-09-05 PROCEDURE — 70450 CT HEAD/BRAIN W/O DYE: CPT

## 2023-09-05 NOTE — TELEPHONE ENCOUNTER
Darian with [facility] states that pt has [employer]. They have denied authorization as there is an in network provider.

## 2023-09-06 ENCOUNTER — HOME HEALTH ADMISSION (OUTPATIENT)
Dept: HOME HEALTH SERVICES | Facility: HOME HEALTH | Age: 73
End: 2023-09-06
Payer: MEDICARE

## 2023-09-12 ENCOUNTER — TELEPHONE (OUTPATIENT)
Age: 73
End: 2023-09-12

## 2023-09-12 NOTE — TELEPHONE ENCOUNTER
Patient's Daughter, Simon Carpenter states she needs a call back to discuss medication prescribed on 9/4/23 just received in stock at pharmacy for nitrofurantoin, macrocrystal-monohydrate, (MACROBID) 100 MG capsule & why this was prescribed. Simon Carpenter also states she needs to get a \"Prescription/Order for patient to receive an upright/Seated Rollator Walker so insurance will cover. \"    Please call to discus & advise on all.  Thank you

## 2023-09-13 NOTE — TELEPHONE ENCOUNTER
Pt dgt called. 2 identifiers confirmed. Informed macrobid was prescribed for uti. Verbalized understanding. States her mom needs rollator with seat attachment for her dizziness.

## 2023-09-14 ENCOUNTER — HOME CARE VISIT (OUTPATIENT)
Facility: HOME HEALTH | Age: 73
End: 2023-09-14

## 2023-09-14 ENCOUNTER — HOME CARE VISIT (OUTPATIENT)
Dept: HOME HEALTH SERVICES | Facility: HOME HEALTH | Age: 73
End: 2023-09-14

## 2023-09-14 VITALS
TEMPERATURE: 97 F | DIASTOLIC BLOOD PRESSURE: 60 MMHG | WEIGHT: 129.25 LBS | OXYGEN SATURATION: 98 % | SYSTOLIC BLOOD PRESSURE: 110 MMHG | BODY MASS INDEX: 21.51 KG/M2 | RESPIRATION RATE: 18 BRPM | HEART RATE: 98 BPM

## 2023-09-14 PROCEDURE — 0221000100 HH NO PAY CLAIM PROCEDURE

## 2023-09-14 PROCEDURE — G0299 HHS/HOSPICE OF RN EA 15 MIN: HCPCS

## 2023-09-14 PROCEDURE — G0152 HHCP-SERV OF OT,EA 15 MIN: HCPCS

## 2023-09-14 ASSESSMENT — ENCOUNTER SYMPTOMS
DYSPNEA ACTIVITY LEVEL: AFTER AMBULATING MORE THAN 20 FT
PAIN LOCATION - PAIN QUALITY: ACHE
STOOL DESCRIPTION: FORMED
CONSTIPATION: 1

## 2023-09-14 NOTE — HOME HEALTH
Reason for referral, [facility] SUMMARY of clinical condition: CKD, UTI admitted to home care for [facility]. Nursing needed for med management, gu teaching and management, pain management. Clinical Assessment/Skilled reason for admission to home health (What this means for the patient overall and need for ongoing skilled care):Patient will continue to benefit from skilled nursing services for med management, gu teaching and management, pain management. Diagnosis: CKD, UTI    Subjective (statement from pt/cg that is relative to why you are there): patient reports chronic headache    Caregiver: relative. Caregiver assists with: Medications, Meals, Transportation and Housekeeping Caregiver unable to assist with: Bathing, ADL and IADL. Caregiver is available 24 hours/day Caregiver is  present at this visit and did participate with clinician. Medications reconciled and all medications are available in the home this visit. The following education was provided regarding medications: medication interactions and look alike medications: na. Patient/CG able to demonstrate knowledge through teach back with 75 percent accuracy. MD notified of any discrepancies/medication interactions na. A list of reconciled medications has been given to the patient/caregiver  and uploaded to media.     High risk med teaching was performed on macrobid, Antibiotic therapy education Purpose, dose, and frequency and Side effects such as nausea, vomiting, diarrhea, fungal (yeast) vaginal infections or oral thrush      Patient at risk for falls Yes:   Recommended requesting PT/OT orders due to fall risk YES:   Patient response to recommended requesting of PT/OT orders: agreeable    Interdisciplinary communication with:  Physician for the purpose of admit notification    Written Teaching Material Utilized: admit packet, med list    Clinician reviewed orientation to home health booklet with patient/caregiver including agency phone number,

## 2023-09-15 VITALS
OXYGEN SATURATION: 98 % | SYSTOLIC BLOOD PRESSURE: 110 MMHG | HEART RATE: 98 BPM | TEMPERATURE: 97 F | RESPIRATION RATE: 18 BRPM | DIASTOLIC BLOOD PRESSURE: 60 MMHG

## 2023-09-15 ASSESSMENT — ENCOUNTER SYMPTOMS: PAIN LOCATION - PAIN QUALITY: ACHE

## 2023-09-15 NOTE — HOME HEALTH
65 yo female referred to home care secondary to a functional decline over the last several weeks. Daughter reports pt had 3 falls in 1 day about 1 month ago. Pt lives in a first floor apt with her daughter. PLOF: mod I for ADLS. Pt does have cognitive impairments. Dx: UTI, CKD    Subjective: I am doing ok. I do not have a walker yet, but it has been ordered. Falls since last visit No(if yes complete the Fall Tracking Form and include bsrifallreport):     Caregiver involvement changes: daughter is primary caregiver and assist with care as needed. Home health supplies by type and quantity ordered/delivered this visit include: NA    Clinician asked if patient has had any physician contact since last home care visit and patient states: NO    Clinician asked if patient has any new or changed medications and patient states:  NO   If Yes, were medications reconciled? N/A- RNABHILASH, performed med rec prior to OTs arrival.   Was the certifying physician notified of changes in medications? N/A     Clinical assessment (what this visit means for the patient overall and need for ongoing skilled care) and progress or lack of progress towards SPECIFIC goals: Personable and motivated, previously mod I patient presents at high risk of falls (MAHC-10 = 9), and min A for safe ADL completion (Modified Barthel Index = 60/100) during OT Eval. Pt scored 3/5 on MMT for BUE.  has had multiple falls and would benefit from home safety recommendations and Skilled OT services are necessary to reduce patient's fall risk and assist him in safely returning to her PLOF. Written Teaching Material Utilized: N/A    Interdisciplinary communication with: YAMILET HUNG for the purpose of POC collaboration    Discharge planning as follows:  Will discharge when the patient has reached their maximum functional potential and maximum safety in their home and When goals are met    Specific plan for next visit: Instruct caregiver/patient in ADL training,

## 2023-09-19 ENCOUNTER — HOME CARE VISIT (OUTPATIENT)
Dept: HOME HEALTH SERVICES | Facility: HOME HEALTH | Age: 73
End: 2023-09-19

## 2023-09-19 ENCOUNTER — HOME CARE VISIT (OUTPATIENT)
Facility: HOME HEALTH | Age: 73
End: 2023-09-19

## 2023-09-19 VITALS
TEMPERATURE: 97.7 F | SYSTOLIC BLOOD PRESSURE: 98 MMHG | OXYGEN SATURATION: 99 % | HEART RATE: 100 BPM | RESPIRATION RATE: 16 BRPM | DIASTOLIC BLOOD PRESSURE: 60 MMHG

## 2023-09-19 PROCEDURE — G0300 HHS/HOSPICE OF LPN EA 15 MIN: HCPCS

## 2023-09-19 NOTE — HOME HEALTH
Subjective: \"I think I'm doing well. \"  Falls since last visit No(if yes complete the Fall Tracking Form and include bsrifallreport):   Caregiver involvement changes: No  Home health supplies by type and quantity ordered/delivered this visit include: n/a    Clinician asked if patient has had any physician contact since last home care visit and patient states: NO  Clinician asked if patient has any new or changed medications and patient states:  NO   If Yes, were medications reconciled? N/A   Was the certifying physician notified of changes in medications? N/A     Clinical assessment (what this visit means for the patient overall and need for ongoing skilled care) and progress or lack of progress towards SPECIFIC goals: Pt at risk for rehospitalization r/t fall risk, CKD exacerbation. Written Teaching Material Utilized: N/A    Interdisciplinary communication with: N/A for the purpose of n/a    Discharge planning as follows:  When goals are met    Specific plan for next visit: Assessment, education as needed

## 2023-09-19 NOTE — PROGRESS NOTES
evaluation with her providers and discuss potential implications for treatment. Specifically:  If desired and not already considered, the patient may benefit from a conversation regarding the trial of a disease course altering medication, which may be of use to slow the progression of her cognitive difficulties. If not already considered, functional imaging (e.g., DaTscan) of the brain may assist with diagnosis  The patient would likely benefit from a review of her psychotropic medication as testing continues to demonstrate significant symptoms of depression and anxiety. At this time, the patient's neuropsychological evaluation suggests she does not maintain capacity to make decisions. If not already addressed, the patient and her family are encouraged to discuss legal issues such as power of  to assist the patient in future financial and healthcare decisions. Information regarding these issues can be found at www.caringinfo. org or www.agingwithdignity.org/5wishes.html  As the patient is currently in the Moderate stage of dementia, daily supervision and assistance is recommended. In general, the patient will benefit from a structured, routine environment that will provide assistance with complex activities requiring memory, organization, and planning (e.g., meal preparation, medication management) while allowing them to maintain independence in other basic activities of daily living (e.g., cleaning, dressing, feeding) for as long as possible. Given the patient's current level of functioning, it is anticipated they presently need less than 24-hour care; however, as the disease progresses, their needs for supervision should be reassessed. Continued monitoring and treatment of the patient's neuropsychiatric symptoms/dementia is recommended.  Due to their level of cognitive impairment, they will benefit from a combination of psychopharmacology, environmental management, and concrete behaviorally-focused

## 2023-09-20 ENCOUNTER — OFFICE VISIT (OUTPATIENT)
Age: 73
End: 2023-09-20
Payer: MEDICARE

## 2023-09-20 ENCOUNTER — HOME CARE VISIT (OUTPATIENT)
Dept: HOME HEALTH SERVICES | Facility: HOME HEALTH | Age: 73
End: 2023-09-20
Payer: MEDICARE

## 2023-09-20 DIAGNOSIS — G31.83 MODERATE LEWY BODY DEMENTIA WITH PSYCHOTIC DISTURBANCE (HCC): Primary | ICD-10-CM

## 2023-09-20 DIAGNOSIS — F02.B2 MODERATE LEWY BODY DEMENTIA WITH PSYCHOTIC DISTURBANCE (HCC): Primary | ICD-10-CM

## 2023-09-20 PROCEDURE — 96132 NRPSYC TST EVAL PHYS/QHP 1ST: CPT | Performed by: CLINICAL NEUROPSYCHOLOGIST

## 2023-09-20 PROCEDURE — 96133 NRPSYC TST EVAL PHYS/QHP EA: CPT | Performed by: CLINICAL NEUROPSYCHOLOGIST

## 2023-09-20 NOTE — PROGRESS NOTES
Interview: 1005 - 1030 = 25 minutes  Testing and scoring by provider: 16 minutes  Testing by technician: 5704 - 5934 = 157 minutes  Scoring by technician: 46 minutes  Neuropsychological testing evaluation services*: 199 minutes + 15 minutes feedback = 214 minutes total    BILLING:  30681 x 1 Unit  96136 x 1 Unit  96138 x 1 Unit  96139 x 6 Units  96132 x 1 Unit  96133 x 3 Units    *Neuropsychological testing evaluation services include: Integration of patient data, interpretation of standardized test results and clinical data, clinical decision-making, treatment planning and report, and interactive feedback to the patient, family member(s) or caregiver(s), when performed.

## 2023-09-21 ENCOUNTER — HOME CARE VISIT (OUTPATIENT)
Facility: HOME HEALTH | Age: 73
End: 2023-09-21

## 2023-09-21 VITALS
SYSTOLIC BLOOD PRESSURE: 110 MMHG | TEMPERATURE: 98.4 F | WEIGHT: 127 LBS | DIASTOLIC BLOOD PRESSURE: 70 MMHG | HEART RATE: 100 BPM | BODY MASS INDEX: 21.13 KG/M2 | OXYGEN SATURATION: 97 % | RESPIRATION RATE: 16 BRPM

## 2023-09-21 PROCEDURE — G0299 HHS/HOSPICE OF RN EA 15 MIN: HCPCS

## 2023-09-21 NOTE — HOME HEALTH
Subjective: I just woke up from a little nap  Falls since last visit No(if yes complete the Fall Tracking Form and include bsrifallreport):   Caregiver involvement changes: NO  Home health supplies by type and quantity ordered/delivered this visit include: NO    Clinician asked if patient has had any physician contact since last home care visit and patient states: NO  Clinician asked if patient has any new or changed medications and patient states:  NO   If Yes, were medications reconciled? N/A   Was the certifying physician notified of changes in medications? N/A     Clinical assessment (what this visit means for the patient overall and need for ongoing skilled care) and progress or lack of progress towards SPECIFIC goals: Pt with CKD and recent UTI. States that she has been dealing with CKD for years and is knowledgable on s/s of  CKD and UTI. Disease process reviewed with pt. Pt educated to monitor BP, urine output and blood pressure. She currently states that she is not experiencing symptoms of a UTI. SN to continue to monitor pt for infection prevention. Written Teaching Material Utilized: N/A    Interdisciplinary communication with: N/A     Discharge planning as follows:  When goals are met    Specific plan for next visit: continue education

## 2023-09-25 ENCOUNTER — HOME CARE VISIT (OUTPATIENT)
Dept: HOME HEALTH SERVICES | Facility: HOME HEALTH | Age: 73
End: 2023-09-25
Payer: MEDICARE

## 2023-09-25 RX ORDER — SODIUM BICARBONATE 650 MG/1
TABLET ORAL
Qty: 360 TABLET | Refills: 3 | Status: SHIPPED | OUTPATIENT
Start: 2023-09-25

## 2023-09-25 NOTE — TELEPHONE ENCOUNTER
PCP: Shawn Riddle, DO    Last appt: 8/29/2023  Future Appointments   Date Time Provider Department Center   9/26/2023 To Be Determined Beatriz Bolton, LPN [id number] [address]   9/29/2023 To Be Determined Raji [facility], [facility]   10/3/2023 To Be Determined Brian Neville, OT [id number] [address]   10/3/2023 To Be Determined Beatriz Bolton, LPN [address] [address]   10/6/2023  3:00 PM Meri Ge III [facility] BS AMB   10/10/2023 To Be Determined Beatriz Bolton, LPN [id number] [address]   10/16/2023 11:30 AM Vince Burnett, OBED [facility] BS AMB   2/15/2024 11:30 AM Vince Burnett, ANP [facility] BS AMB   2/29/2024  3:00 PM Meri Ge III [facility] BS AMB   6/17/2024 11:30 AM Vince Burnett ANP [facility] BS AMB   10/17/2024 11:30 AM OBED Solorzano [facility] BS AMB       Requested Prescriptions     Pending Prescriptions Disp Refills    sodium bicarbonate 650 MG tablet [Pharmacy Med Name: SODIUM BICARB 650 MG TABLET] 360 tablet 3     Sig: TAKE 1 TABLET BY MOUTH FOUR TIMES A DAY

## 2023-10-06 ENCOUNTER — OFFICE VISIT (OUTPATIENT)
Age: 73
End: 2023-10-06
Payer: MEDICARE

## 2023-10-06 VITALS
HEIGHT: 65 IN | SYSTOLIC BLOOD PRESSURE: 107 MMHG | HEART RATE: 79 BPM | RESPIRATION RATE: 19 BRPM | DIASTOLIC BLOOD PRESSURE: 73 MMHG | BODY MASS INDEX: 21.66 KG/M2 | TEMPERATURE: 97.5 F | OXYGEN SATURATION: 100 % | WEIGHT: 130 LBS

## 2023-10-06 DIAGNOSIS — F02.A0 MILD DEMENTIA ASSOCIATED WITH OTHER UNDERLYING DISEASE, WITHOUT BEHAVIORAL DISTURBANCE, PSYCHOTIC DISTURBANCE, MOOD DISTURBANCE, OR ANXIETY (HCC): Primary | ICD-10-CM

## 2023-10-06 DIAGNOSIS — F32.89 OTHER DEPRESSION: ICD-10-CM

## 2023-10-06 DIAGNOSIS — Z94.0 HISTORY OF KIDNEY TRANSPLANT: ICD-10-CM

## 2023-10-06 DIAGNOSIS — N18.31 CHRONIC KIDNEY DISEASE, STAGE 3A (HCC): ICD-10-CM

## 2023-10-06 PROCEDURE — 1090F PRES/ABSN URINE INCON ASSESS: CPT | Performed by: INTERNAL MEDICINE

## 2023-10-06 PROCEDURE — 99214 OFFICE O/P EST MOD 30 MIN: CPT | Performed by: INTERNAL MEDICINE

## 2023-10-06 PROCEDURE — G8399 PT W/DXA RESULTS DOCUMENT: HCPCS | Performed by: INTERNAL MEDICINE

## 2023-10-06 PROCEDURE — G8427 DOCREV CUR MEDS BY ELIG CLIN: HCPCS | Performed by: INTERNAL MEDICINE

## 2023-10-06 PROCEDURE — 1123F ACP DISCUSS/DSCN MKR DOCD: CPT | Performed by: INTERNAL MEDICINE

## 2023-10-06 PROCEDURE — G8484 FLU IMMUNIZE NO ADMIN: HCPCS | Performed by: INTERNAL MEDICINE

## 2023-10-06 PROCEDURE — 3017F COLORECTAL CA SCREEN DOC REV: CPT | Performed by: INTERNAL MEDICINE

## 2023-10-06 PROCEDURE — 1036F TOBACCO NON-USER: CPT | Performed by: INTERNAL MEDICINE

## 2023-10-06 PROCEDURE — G8420 CALC BMI NORM PARAMETERS: HCPCS | Performed by: INTERNAL MEDICINE

## 2023-10-06 RX ORDER — DONEPEZIL HYDROCHLORIDE 5 MG/1
5 TABLET, FILM COATED ORAL NIGHTLY
Qty: 90 TABLET | Refills: 1 | Status: SHIPPED | OUTPATIENT
Start: 2023-10-06

## 2023-10-06 NOTE — PROGRESS NOTES
Diego Beavers is a 74 y.o. female who presents for evaluation of worsening memory. Last seen by me aug 29, 2023 in [facility]. Since then she had her neuropsychology testing done, and her memory is not good. Was dx with dementia, perhaps due to parkinsons ds, though she does not currently carry dx of parkinsons. She has an appt with neurology next week. Daughter with her today. Pt has no complaints.       ROS:  Constitutional: negative for fevers, chills, anorexia and weight loss  Eyes:   negative for visual disturbance and irritation  ENT:   negative for tinnitus,sore throat,nasal congestion,ear pain,hoarseness  Respiratory:  negative for cough, hemoptysis, dyspnea,wheezing  CV:   negative for chest pain, palpitations, lower extremity edema  GI:   negative for nausea, vomiting, diarrhea, abdominal pain,melena  Genitourinary: negative for frequency, dysuria and hematuria  Musculoskel: negative for myalgias, arthralgias, back pain, muscle weakness, joint pain  Neurological:  negative for headaches, dizziness, focal weakness, numbness  Psychiatric:     Negative for depression or anxiety      Past Medical History:   Diagnosis Date    Acute hypoxemic respiratory failure ([facility]) 1/16/2023    Dec 2022 went to ED     Chronic kidney disease     Depression     GAURAV (generalized anxiety disorder)     Insomnia     MCI (mild cognitive impairment) with memory loss 06/2022    RA (rheumatoid arthritis) ([facility])     Throat cancer ([facility])        Past Surgical History:   Procedure Laterality Date    GYN      hysterectomy     TRANSPLANT  04/03/1997    kidney       Family History   Problem Relation Age of Onset    Diabetes Mother     No Known Problems Father        Social History     Socioeconomic History    Marital status:      Spouse name: Not on file    Number of children: Not on file    Years of education: Not on file    Highest education level: Not on file   Occupational History    Not on file   Tobacco Use    Smoking status: Never

## 2023-10-06 NOTE — PROGRESS NOTES
1. \"Have you been to the ER, urgent care clinic since your last visit? Hospitalized since your last visit? \" No    2. \"Have you seen or consulted any other health care providers outside of the [address] [place] [address] since your last visit? \" No     3. For patients aged 43-73: Has the patient had a colonoscopy / FIT/ Cologuard? NA - based on age      If the patient is female:    4. For patients aged 43-66: Has the patient had a mammogram within the past 2 years? NA - based on age or sex      11. For patients aged 21-65: Has the patient had a pap smear?  NA - based on age or sex

## 2023-10-10 DIAGNOSIS — F41.9 ANXIETY DISORDER, UNSPECIFIED: ICD-10-CM

## 2023-10-11 ENCOUNTER — TELEPHONE (OUTPATIENT)
Age: 73
End: 2023-10-11

## 2023-10-11 DIAGNOSIS — Z91.81 AT HIGH RISK FOR FALLS: ICD-10-CM

## 2023-10-11 DIAGNOSIS — G31.83 MODERATE LEWY BODY DEMENTIA WITH PSYCHOTIC DISTURBANCE (HCC): Primary | ICD-10-CM

## 2023-10-11 DIAGNOSIS — F02.B2 MODERATE LEWY BODY DEMENTIA WITH PSYCHOTIC DISTURBANCE (HCC): Primary | ICD-10-CM

## 2023-10-11 DIAGNOSIS — R29.6 RECURRENT FALLS: ICD-10-CM

## 2023-10-11 DIAGNOSIS — M06.9 RHEUMATOID ARTHRITIS OF OTHER SITE, UNSPECIFIED WHETHER RHEUMATOID FACTOR PRESENT (HCC): ICD-10-CM

## 2023-10-11 DIAGNOSIS — R55 SYNCOPE AND COLLAPSE: ICD-10-CM

## 2023-10-13 ENCOUNTER — TELEPHONE (OUTPATIENT)
Age: 73
End: 2023-10-13

## 2023-10-13 NOTE — TELEPHONE ENCOUNTER
Patient's Daughter, Yadira Smith states she needs a call back to discuss Plan of care for patient seen on 10/6/23 that went to [facility] yesterday, 10/12/23 for a Fall & X-ray of Rib were done & Right Breast Mass was found & was advised to F/U with PCP. Please call to discuss & advise Plan of Care.  Thank you

## 2023-10-16 ENCOUNTER — OFFICE VISIT (OUTPATIENT)
Age: 73
End: 2023-10-16
Payer: MEDICARE

## 2023-10-16 VITALS
BODY MASS INDEX: 21.23 KG/M2 | SYSTOLIC BLOOD PRESSURE: 167 MMHG | HEART RATE: 64 BPM | TEMPERATURE: 98 F | HEIGHT: 65 IN | OXYGEN SATURATION: 100 % | RESPIRATION RATE: 16 BRPM | DIASTOLIC BLOOD PRESSURE: 94 MMHG | WEIGHT: 127.4 LBS

## 2023-10-16 DIAGNOSIS — R42 DIZZY SPELLS: ICD-10-CM

## 2023-10-16 DIAGNOSIS — F02.B2 MODERATE LEWY BODY DEMENTIA WITH PSYCHOTIC DISTURBANCE (HCC): Primary | ICD-10-CM

## 2023-10-16 DIAGNOSIS — G31.83 MODERATE LEWY BODY DEMENTIA WITH PSYCHOTIC DISTURBANCE (HCC): Primary | ICD-10-CM

## 2023-10-16 DIAGNOSIS — E53.8 VITAMIN B12 DEFICIENCY NEUROPATHY (HCC): ICD-10-CM

## 2023-10-16 DIAGNOSIS — G63 VITAMIN B12 DEFICIENCY NEUROPATHY (HCC): ICD-10-CM

## 2023-10-16 DIAGNOSIS — Z91.81 HISTORY OF RECENT FALL: ICD-10-CM

## 2023-10-16 PROCEDURE — G8420 CALC BMI NORM PARAMETERS: HCPCS | Performed by: PSYCHIATRY & NEUROLOGY

## 2023-10-16 PROCEDURE — 1123F ACP DISCUSS/DSCN MKR DOCD: CPT | Performed by: PSYCHIATRY & NEUROLOGY

## 2023-10-16 PROCEDURE — G8427 DOCREV CUR MEDS BY ELIG CLIN: HCPCS | Performed by: PSYCHIATRY & NEUROLOGY

## 2023-10-16 PROCEDURE — 3017F COLORECTAL CA SCREEN DOC REV: CPT | Performed by: PSYCHIATRY & NEUROLOGY

## 2023-10-16 PROCEDURE — 1090F PRES/ABSN URINE INCON ASSESS: CPT | Performed by: PSYCHIATRY & NEUROLOGY

## 2023-10-16 PROCEDURE — 99215 OFFICE O/P EST HI 40 MIN: CPT | Performed by: PSYCHIATRY & NEUROLOGY

## 2023-10-16 PROCEDURE — G8484 FLU IMMUNIZE NO ADMIN: HCPCS | Performed by: PSYCHIATRY & NEUROLOGY

## 2023-10-16 PROCEDURE — 1036F TOBACCO NON-USER: CPT | Performed by: PSYCHIATRY & NEUROLOGY

## 2023-10-16 PROCEDURE — G8399 PT W/DXA RESULTS DOCUMENT: HCPCS | Performed by: PSYCHIATRY & NEUROLOGY

## 2023-10-16 ASSESSMENT — PATIENT HEALTH QUESTIONNAIRE - PHQ9
1. LITTLE INTEREST OR PLEASURE IN DOING THINGS: 0
2. FEELING DOWN, DEPRESSED OR HOPELESS: 0
SUM OF ALL RESPONSES TO PHQ9 QUESTIONS 1 & 2: 0
SUM OF ALL RESPONSES TO PHQ QUESTIONS 1-9: 0

## 2023-10-16 NOTE — ASSESSMENT & PLAN NOTE
Have recommended to the family to watch patient's blood pressure and pulse 2 or 3 times per week.   And especially at times where she is complaining about dizziness  Patient has been encouraged to make sure she is taking in enough free water and she needs to reduce the amount of soda she drinks per day and I have limited to her to no more than 2 cans of soda per day the rest needs to be water or juices    Might need Holter monitor we will continue to monitor at this point but certainly something that can be considered

## 2023-10-16 NOTE — ASSESSMENT & PLAN NOTE
Remained stable and well-controlled on vitamin B12 supplements  Would recommend continuing on the B12 at this time

## 2023-10-16 NOTE — ASSESSMENT & PLAN NOTE
Presently doing well PCP is placed patient on donepezil 5 mg/day which I think is appropriate her heart rate is in the low 60s and she does have problems with dizziness so would not increase the donepezil further    Memantine can be added in about a month to give her time to stabilize on the donepezil would recommend 10 mg twice daily but she can take a half a tablet twice a day for the first 2 weeks and then increase to 1 tablet twice daily

## 2023-10-16 NOTE — ASSESSMENT & PLAN NOTE
Unclear etiology could be cardiac could be dehydration  Recommend family monitor blood pressure and pulse once or twice per week and especially check blood pressure and pulse if patient is complaining of dizziness    Recommend patient stop drinking sodas all day long and limited to no more than 2 cans/day and supplementing with free water and juices throughout the day otherwise    Reviewed fall precautions

## 2023-10-16 NOTE — PATIENT INSTRUCTIONS
The medication list included in this document is our record of what you are currently taking, including any changes that were made at today's visit. If you find any differences when compared to your medications at home, or have any questions that were not answered at your visit, please contact the office. As per discussion  You may have 2 sodas per day the rest has to be juice or free water  You need to eat consistently throughout the day breakfast lunch and dinner  They do not have to be giant meals but you do need to eat consistently    Below is a list of resources to assist you with addressing some of the long-term issues that we need to worry about regarding supervision within the home    Resources are listed below:  [employer]   [email or web address]   phone number [phone] [facility] care Specialist [facility]   [employer]/Rm [phone]    [employer]/Rm-[facility]       Visiting physicians might be a good option for you to consider given the fact that it is difficult for you to get out for office visit and or have difficulty with virtual visits  Visiting physicians will come to your home they can manage all of your care to include primary care as well as other complex health issues to include your neurology condition    I strongly recommend reaching out to them to see if you would qualify or want to the services as I do think you would get best medical care through this agency given current circumstances and limitations    Visiting physicians can be reached at the following:  Phone number: [phone]    Online: [email or web address]    Address:  [address] [place] [address], [address] [place] [address] [address], [address] [facility]. [address]             Office Policies    Phone calls/patient messages:  Please allow up to 24 hours for someone in the office to contact you about your call or message.  Be mindful your provider may be out of the office or your message may

## 2023-10-16 NOTE — PROGRESS NOTES
[address]  In Office FOLLOW-UP VISIT         Chase Mcclellan is a 74 y.o.  female who presents today for the following:  Chief Complaint   Patient presents with    Follow-up     Follow up and  has had her testing done and is up set and spending more time in bed like 5 -6 hours during the day and still sleeping at night. ASSESSMENT AND PLAN  1. 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 Vitamin B12 deficiency neuropathy ([address])  Assessment & Plan:   Remained stable and well-controlled on vitamin B12 supplements  Would recommend continuing on the B12 at this time  3. Dizzy spells  Assessment & Plan:   Have recommended to the family to watch patient's blood pressure and pulse 2 or 3 times per week. And especially at times where she is complaining about dizziness  Patient has been encouraged to make sure she is taking in enough free water and she needs to reduce the amount of soda she drinks per day and I have limited to her to no more than 2 cans of soda per day the rest needs to be water or juices    Might need Holter monitor we will continue to monitor at this point but certainly something that can be considered      4.  History of recent fall  Assessment & Plan:   Unclear etiology could be cardiac could be dehydration  Recommend family monitor blood pressure and pulse once or twice per week and especially check blood pressure and pulse if patient is complaining of dizziness    Recommend

## 2023-10-16 NOTE — TELEPHONE ENCOUNTER
Per Dr. Willie Moreland requested record from [facility]. Advised daughter records would be reviewed prior to Dr. Willie Moreland proceeding with POC. Daughter states understanding.  Will call daughter with PCP recommendation

## 2023-10-31 ENCOUNTER — TELEPHONE (OUTPATIENT)
Age: 73
End: 2023-10-31

## 2023-10-31 NOTE — TELEPHONE ENCOUNTER
Patient called in stating checking to see if you are going to order a wheelchair for patient and she stated she will try to obtain records from [facility]  regarding mass on breast so  can review

## 2023-11-07 DIAGNOSIS — F02.C4 SEVERE LATE ONSET ALZHEIMER'S DEMENTIA WITH ANXIETY (HCC): Primary | ICD-10-CM

## 2023-11-07 DIAGNOSIS — G30.1 SEVERE LATE ONSET ALZHEIMER'S DEMENTIA WITH ANXIETY (HCC): Primary | ICD-10-CM

## 2023-11-09 DIAGNOSIS — Z12.31 VISIT FOR SCREENING MAMMOGRAM: Primary | ICD-10-CM

## 2023-11-17 ENCOUNTER — HOSPITAL ENCOUNTER (OUTPATIENT)
Facility: HOSPITAL | Age: 73
End: 2023-11-17
Attending: INTERNAL MEDICINE
Payer: MEDICARE

## 2023-11-17 VITALS — BODY MASS INDEX: 21.16 KG/M2 | WEIGHT: 127 LBS | HEIGHT: 65 IN

## 2023-11-17 DIAGNOSIS — Z12.31 VISIT FOR SCREENING MAMMOGRAM: ICD-10-CM

## 2023-11-17 PROCEDURE — 77067 SCR MAMMO BI INCL CAD: CPT

## 2023-12-08 ENCOUNTER — OFFICE VISIT (OUTPATIENT)
Age: 73
End: 2023-12-08
Payer: MEDICARE

## 2023-12-08 VITALS
HEIGHT: 65 IN | RESPIRATION RATE: 16 BRPM | DIASTOLIC BLOOD PRESSURE: 81 MMHG | SYSTOLIC BLOOD PRESSURE: 127 MMHG | TEMPERATURE: 98.1 F | OXYGEN SATURATION: 100 % | WEIGHT: 136.8 LBS | HEART RATE: 101 BPM | BODY MASS INDEX: 22.79 KG/M2

## 2023-12-08 DIAGNOSIS — M62.08 DIASTASIS RECTI: ICD-10-CM

## 2023-12-08 DIAGNOSIS — F02.B2 MODERATE LEWY BODY DEMENTIA WITH PSYCHOTIC DISTURBANCE (HCC): ICD-10-CM

## 2023-12-08 DIAGNOSIS — Z94.0 HISTORY OF KIDNEY TRANSPLANT: ICD-10-CM

## 2023-12-08 DIAGNOSIS — G31.83 MODERATE LEWY BODY DEMENTIA WITH PSYCHOTIC DISTURBANCE (HCC): ICD-10-CM

## 2023-12-08 DIAGNOSIS — S46.011A ROTATOR CUFF STRAIN, RIGHT, INITIAL ENCOUNTER: Primary | ICD-10-CM

## 2023-12-08 DIAGNOSIS — N18.31 CHRONIC KIDNEY DISEASE, STAGE 3A (HCC): ICD-10-CM

## 2023-12-08 DIAGNOSIS — Z12.11 SCREEN FOR COLON CANCER: ICD-10-CM

## 2023-12-08 PROCEDURE — G8427 DOCREV CUR MEDS BY ELIG CLIN: HCPCS | Performed by: INTERNAL MEDICINE

## 2023-12-08 PROCEDURE — 1123F ACP DISCUSS/DSCN MKR DOCD: CPT | Performed by: INTERNAL MEDICINE

## 2023-12-08 PROCEDURE — G8399 PT W/DXA RESULTS DOCUMENT: HCPCS | Performed by: INTERNAL MEDICINE

## 2023-12-08 PROCEDURE — 1036F TOBACCO NON-USER: CPT | Performed by: INTERNAL MEDICINE

## 2023-12-08 PROCEDURE — G8484 FLU IMMUNIZE NO ADMIN: HCPCS | Performed by: INTERNAL MEDICINE

## 2023-12-08 PROCEDURE — 99214 OFFICE O/P EST MOD 30 MIN: CPT | Performed by: INTERNAL MEDICINE

## 2023-12-08 PROCEDURE — 3017F COLORECTAL CA SCREEN DOC REV: CPT | Performed by: INTERNAL MEDICINE

## 2023-12-08 PROCEDURE — G8420 CALC BMI NORM PARAMETERS: HCPCS | Performed by: INTERNAL MEDICINE

## 2023-12-08 PROCEDURE — 1090F PRES/ABSN URINE INCON ASSESS: CPT | Performed by: INTERNAL MEDICINE

## 2023-12-08 RX ORDER — ZOLPIDEM TARTRATE 5 MG/1
5 TABLET ORAL NIGHTLY
COMMUNITY

## 2023-12-08 RX ORDER — PREDNISONE 20 MG/1
TABLET ORAL
Qty: 18 TABLET | Refills: 0 | Status: SHIPPED | OUTPATIENT
Start: 2023-12-08

## 2023-12-08 ASSESSMENT — PATIENT HEALTH QUESTIONNAIRE - PHQ9
9. THOUGHTS THAT YOU WOULD BE BETTER OFF DEAD, OR OF HURTING YOURSELF: 0
SUM OF ALL RESPONSES TO PHQ9 QUESTIONS 1 & 2: 0
SUM OF ALL RESPONSES TO PHQ QUESTIONS 1-9: 1
7. TROUBLE CONCENTRATING ON THINGS, SUCH AS READING THE NEWSPAPER OR WATCHING TELEVISION: 0
SUM OF ALL RESPONSES TO PHQ QUESTIONS 1-9: 1
SUM OF ALL RESPONSES TO PHQ QUESTIONS 1-9: 1
8. MOVING OR SPEAKING SO SLOWLY THAT OTHER PEOPLE COULD HAVE NOTICED. OR THE OPPOSITE, BEING SO FIGETY OR RESTLESS THAT YOU HAVE BEEN MOVING AROUND A LOT MORE THAN USUAL: 0
2. FEELING DOWN, DEPRESSED OR HOPELESS: 0
SUM OF ALL RESPONSES TO PHQ QUESTIONS 1-9: 1
1. LITTLE INTEREST OR PLEASURE IN DOING THINGS: 0
6. FEELING BAD ABOUT YOURSELF - OR THAT YOU ARE A FAILURE OR HAVE LET YOURSELF OR YOUR FAMILY DOWN: 0
3. TROUBLE FALLING OR STAYING ASLEEP: 0
10. IF YOU CHECKED OFF ANY PROBLEMS, HOW DIFFICULT HAVE THESE PROBLEMS MADE IT FOR YOU TO DO YOUR WORK, TAKE CARE OF THINGS AT HOME, OR GET ALONG WITH OTHER PEOPLE: 0
4. FEELING TIRED OR HAVING LITTLE ENERGY: 1
5. POOR APPETITE OR OVEREATING: 0

## 2023-12-08 NOTE — PATIENT INSTRUCTIONS
Use ice to right shoulder for 15-20 minutes 3x daily for next 6 days, then as needed. If you would like to do some physical therapy, let me know.

## 2023-12-08 NOTE — PROGRESS NOTES
Linh Mathis is a 74 y.o. female who presents for evaluation of right shoulder and arm pain. Last seen by me oct 6, 2023. Has struggled with right shoulder intermittent pain for about a month now, and a lump appeared on her right bicep area on Monday. Denies any trauma to area, and lump has since resolved. No bruising to skin. Also has noticed a bulge in her upper middle abd that comes and goes. Also painless. Daughter with her today.       ROS:  Constitutional: negative for fevers, chills, anorexia and weight loss  Eyes:   negative for visual disturbance and irritation  ENT:   negative for tinnitus,sore throat,nasal congestion,ear pain,hoarseness  Respiratory:  negative for cough, hemoptysis, dyspnea,wheezing  CV:   negative for chest pain, palpitations, lower extremity edema  GI:   negative for nausea, vomiting, diarrhea, abdominal pain,melena  Genitourinary: negative for frequency, dysuria and hematuria  Musculoskel: negative for myalgias, arthralgias, back pain, muscle weakness, joint pain  Neurological:  negative for headaches, dizziness, focal weakness, numbness  Psychiatric:     Negative for depression or anxiety      Past Medical History:   Diagnosis Date    Acute hypoxemic respiratory failure ([address]) 1/16/2023    Dec 2022 went to ED     Chronic kidney disease     Depression     GAURAV (generalized anxiety disorder)     Insomnia     MCI (mild cognitive impairment) with memory loss 06/2022    RA (rheumatoid arthritis) ([address])     Throat cancer [facility])        Past Surgical History:   Procedure Laterality Date    GYN      hysterectomy     TRANSPLANT  04/03/1997    kidney       Family History   Problem Relation Age of Onset    Diabetes Mother     No Known Problems Father        Social History     Socioeconomic History    Marital status:      Spouse name: Not on file    Number of children: Not on file    Years of education: Not on file    Highest education level: Not on file   Occupational History    Not

## 2023-12-15 DIAGNOSIS — G47.00 INSOMNIA, UNSPECIFIED TYPE: Primary | ICD-10-CM

## 2023-12-15 RX ORDER — POTASSIUM CHLORIDE 750 MG/1
10 TABLET, EXTENDED RELEASE ORAL 2 TIMES DAILY
Qty: 180 TABLET | Refills: 3 | Status: SHIPPED | OUTPATIENT
Start: 2023-12-15

## 2023-12-15 RX ORDER — ZOLPIDEM TARTRATE 5 MG/1
TABLET ORAL
Qty: 30 TABLET | Refills: 5 | Status: SHIPPED | OUTPATIENT
Start: 2023-12-15 | End: 2024-06-12

## 2023-12-15 NOTE — TELEPHONE ENCOUNTER
Patient's Daughter, Danilo Gold states patient is out of Zolpidem Medication that was requested earlier & Never received at Pharmacy. Please call if any questions or to advise when approved for .  Thank you

## 2024-01-08 NOTE — TELEPHONE ENCOUNTER
Caller's first and last name: [facility]       Reason for call: Stating Cyclosporine icd codes are needed for Medicare billing.      Callback required yes/no and why: Yes       Best contact number(s): [phone] fax [phone]       Details to clarify the request:       Vito Bhakta
Dx codes faxed to [facility]
Sean Dunbar(Resident)

## 2024-01-15 RX ORDER — DONEPEZIL HYDROCHLORIDE 5 MG/1
5 TABLET, FILM COATED ORAL NIGHTLY
Qty: 90 TABLET | Refills: 3 | Status: SHIPPED | OUTPATIENT
Start: 2024-01-15

## 2024-01-18 ENCOUNTER — TELEPHONE (OUTPATIENT)
Age: 74
End: 2024-01-18

## 2024-01-18 NOTE — TELEPHONE ENCOUNTER
Caller States:  Symptoms:   Cough  Chest congestion and pain when breathing  Head congestion  Onset:   Yesterday   Covid Test:  POSITIVE,  on 1/18 today  OTC/Home Treatment thus far:  Mucinex for congestion      Appointments:  Date of last appointment:    12/8/2023       Pharmacy:  [id number] - [place], [place] - [address] - P [phone] - F [phone]        Requests:  Requests paxlovid or med        Caller confirms readback of documented phone/fax number(s) as correct.    Caller advised that there can be a 24-48 business hour turn around time on callbacks.    Caller advised that if pt deems they cannot wait any longer or symptoms worsen, ED or UC would be another option to consider for immediate treatment.

## 2024-01-18 NOTE — TELEPHONE ENCOUNTER
Called pt, spoke with daughter montrell per privacy. Scheduled virtual visit for 01/19/2024 for this.

## 2024-01-19 ENCOUNTER — TELEMEDICINE (OUTPATIENT)
Age: 74
End: 2024-01-19

## 2024-01-19 DIAGNOSIS — Z94.0 KIDNEY TRANSPLANT RECIPIENT: ICD-10-CM

## 2024-01-19 DIAGNOSIS — N18.31 CHRONIC KIDNEY DISEASE, STAGE 3A (HCC): ICD-10-CM

## 2024-01-19 DIAGNOSIS — U07.1 COVID: Primary | ICD-10-CM

## 2024-01-19 RX ORDER — BENZONATATE 100 MG/1
100 CAPSULE ORAL 3 TIMES DAILY PRN
Qty: 30 CAPSULE | Refills: 0 | Status: SHIPPED | OUTPATIENT
Start: 2024-01-19 | End: 2024-01-29

## 2024-01-19 ASSESSMENT — ENCOUNTER SYMPTOMS
COUGH: 1
SHORTNESS OF BREATH: 0
SORE THROAT: 1

## 2024-01-19 ASSESSMENT — PATIENT HEALTH QUESTIONNAIRE - PHQ9
1. LITTLE INTEREST OR PLEASURE IN DOING THINGS: 0
SUM OF ALL RESPONSES TO PHQ9 QUESTIONS 1 & 2: 0
SUM OF ALL RESPONSES TO PHQ QUESTIONS 1-9: 0
SUM OF ALL RESPONSES TO PHQ QUESTIONS 1-9: 0
2. FEELING DOWN, DEPRESSED OR HOPELESS: 0
SUM OF ALL RESPONSES TO PHQ QUESTIONS 1-9: 0
SUM OF ALL RESPONSES TO PHQ QUESTIONS 1-9: 0

## 2024-01-19 NOTE — PROGRESS NOTES
HISTORY OF PRESENT ILLNESS  Ese Garcia is a 73 y.o. female.  Positive For Covid-19  Associated symptoms include chills, congestion, coughing and a sore throat. Pertinent negatives include no chest pain.       This is an established visit completed with telemedicine was completed with video assist. The patient acknowledges and agrees to this method of visitation.    Pt of Dr Marquis. She presents for acute care.    She tested positive for covid today   She started feeling sick yesterday   Her daughter tested positive last Saturday as well  She c/o congestion, body aches, cough, sore throat, chills, sweating  Denies wheezing, SOB  She is taking mucinex  Will treat with molnupiravir   Advised flonase     She had covid in 2022, 3 pfizer shots  Flu shot updated this year    She is on cyclosporin d/t hx of kidney transplant  Also on prednisone 5mg daily chronically     Patient Active Problem List   Diagnosis    Kidney transplant recipient    NHL (non-Hodgkin's lymphoma) (HCC)    Other insomnia    Obesity (BMI 30-39.9)    Rheumatoid arthritis (HCC)    Chronic renal disease, stage III (HCC)    Costochondral chest pain    Malaise    Nausea & vomiting    Tachycardia    Memory disturbance    Anxiety and depression    Dizzy spells    Vitamin B12 deficiency neuropathy (HCC)    Current moderate episode of major depressive disorder without prior episode (HCC)    Thrombocytopenia (HCC)    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

## 2024-02-01 LAB — NONINV COLON CA DNA+OCC BLD SCRN STL QL: NORMAL

## 2024-02-12 DIAGNOSIS — Z12.11 SCREEN FOR COLON CANCER: Primary | ICD-10-CM

## 2024-02-15 ENCOUNTER — OFFICE VISIT (OUTPATIENT)
Age: 74
End: 2024-02-15
Payer: MEDICARE

## 2024-02-15 VITALS
HEART RATE: 99 BPM | OXYGEN SATURATION: 98 % | WEIGHT: 139.2 LBS | HEIGHT: 65 IN | RESPIRATION RATE: 16 BRPM | DIASTOLIC BLOOD PRESSURE: 84 MMHG | TEMPERATURE: 97.6 F | BODY MASS INDEX: 23.19 KG/M2 | SYSTOLIC BLOOD PRESSURE: 122 MMHG

## 2024-02-15 DIAGNOSIS — Z91.81 HISTORY OF RECENT FALL: ICD-10-CM

## 2024-02-15 DIAGNOSIS — R41.9 COGNITIVE SAFETY ISSUE: ICD-10-CM

## 2024-02-15 DIAGNOSIS — R44.1 HALLUCINATIONS, VISUAL: ICD-10-CM

## 2024-02-15 DIAGNOSIS — G31.83 MODERATE LEWY BODY DEMENTIA WITH PSYCHOTIC DISTURBANCE (HCC): Primary | ICD-10-CM

## 2024-02-15 DIAGNOSIS — F02.B2 MODERATE LEWY BODY DEMENTIA WITH PSYCHOTIC DISTURBANCE (HCC): Primary | ICD-10-CM

## 2024-02-15 PROCEDURE — G8427 DOCREV CUR MEDS BY ELIG CLIN: HCPCS | Performed by: PSYCHIATRY & NEUROLOGY

## 2024-02-15 PROCEDURE — G8484 FLU IMMUNIZE NO ADMIN: HCPCS | Performed by: PSYCHIATRY & NEUROLOGY

## 2024-02-15 PROCEDURE — 3017F COLORECTAL CA SCREEN DOC REV: CPT | Performed by: PSYCHIATRY & NEUROLOGY

## 2024-02-15 PROCEDURE — G8420 CALC BMI NORM PARAMETERS: HCPCS | Performed by: PSYCHIATRY & NEUROLOGY

## 2024-02-15 PROCEDURE — 1123F ACP DISCUSS/DSCN MKR DOCD: CPT | Performed by: PSYCHIATRY & NEUROLOGY

## 2024-02-15 PROCEDURE — 1090F PRES/ABSN URINE INCON ASSESS: CPT | Performed by: PSYCHIATRY & NEUROLOGY

## 2024-02-15 PROCEDURE — 99214 OFFICE O/P EST MOD 30 MIN: CPT | Performed by: PSYCHIATRY & NEUROLOGY

## 2024-02-15 PROCEDURE — 1036F TOBACCO NON-USER: CPT | Performed by: PSYCHIATRY & NEUROLOGY

## 2024-02-15 PROCEDURE — G8399 PT W/DXA RESULTS DOCUMENT: HCPCS | Performed by: PSYCHIATRY & NEUROLOGY

## 2024-02-15 RX ORDER — DONEPEZIL HYDROCHLORIDE 10 MG/1
10 TABLET, FILM COATED ORAL DAILY
Qty: 90 TABLET | Refills: 3 | Status: SHIPPED | OUTPATIENT
Start: 2024-02-15

## 2024-02-15 NOTE — ASSESSMENT & PLAN NOTE
This will continue to be a situation.  I recommended turning off the stove at night before they go to bed  Also given them a list of resources to reach out to for possible assistance in the home environment

## 2024-02-15 NOTE — ASSESSMENT & PLAN NOTE
Going to increase the donepezil to 10 mg/day.  Heart rate today is in the 90s and looking historically over the past couple months this is the range that she has been in  I have also suggested dialing back the time that she takes the donepezil maybe to dinnertime or earlier in the day as on rare occasion sometimes donepezil can agitate sleep cycles

## 2024-02-15 NOTE — ASSESSMENT & PLAN NOTE
Continues to remain a fall risk  They do have cameras in the house  I have recommended adding a bed alarm for when she gets out of bed at night  This is probably multifactorial in nature

## 2024-02-15 NOTE — PROGRESS NOTES
Patient voices awareness that she is having significant cognitive difficulties      OV 2/15/24: more hallucinations but not scary or bothersome  Still having frequent falls only soft tissue injury  Pt got COVID and recovered well  Still having trouble with sleeping requiring Ambien at bedtime 5 mg  Continues on Aricept 5 mg at bedtime and Zoloft 50 mg daily  Patient's daughter woke 1 night to find the stove was on which was concerning      Dizziness  Getting worse: more dizzy spells during the day  With position changes or walking  Duration: seconds  Noticed worsening several weeks ago when started on Elavil; improved with reduction of amitriptyline from 100 mg down to 50 mg but still feels as though it is worse than baseline  Resolved since off amitriptyline  OV 10/16/23: having more issues again; not drinking free water only soda; patient has had 2 falls as noted above  OV 2/15/2024: No concerns at today's visit      Neuropathic pain: Both feet in the setting of vitamin B12 deficiency  Patient reports improvement not having as much pain continues on vitamin B12  Stable to date on B12          Pertinent diagnostic data  Neuropsych testing completed by Dr. Hampton August 30, 2023  ASSESSMENT:  Moderate dementia with Lewy bodies with psychotic disturbance: G31.A3; F02.B2  Also noted in the assessment is that the patient is not maintain capacity to make decisions      5/26/2022 by Dr. Piña  EMG/NCS: This study was normal.  There was no electrodiagnostic evidence upon today’s examination suggesting a focal or generalized large fiber neuropathy, myopathy, or radiculopathy.  This study cannot exclude a small fiber neuropathy.  Clinical correlation is recommended.    Neuropsych testing completed by Dr. Quevedo February 16, 2021  DIAGNOSTIC IMPRESSIONS:      ICD-10-CM ICD-9-CM     1. Age-related cognitive decline  R41.81 294.9            Carotid duplex studies completed 4/11/2022  Interpretation Summary    This study

## 2024-02-15 NOTE — PATIENT INSTRUCTIONS
As per discussion  I have increased the donepezil the other name for that is Aricept to a total of 10 mg/day.  Try adjusting the timing on the dose and rare occasion sometimes Aricept can interfere with sleep it may or may not make a difference but I think dialing it back to early in the day may be of some benefit.    I also recommend getting an alarm on the bed that way your family can get some sleep and not have to worry so much    Below is a list of resources to assist you with addressing some of the long-term issues that we need to worry about regarding supervision within the home    Resources are listed below:  [employer]   [employer]   phone number [phone] [Home care Specialist April Givens]    [facility]   [email or web address] [phone]    [facility] [facility]   [employer]     I strongly recommend that you contact the local Alzheimer's Society they are wonderful regarding education and support and can help you navigate through this diagnosis over time  [facility] Alzheimer's Association chapter phone number: [phone]    I also recommend going to the National Alzheimer's Society website but please remember this is a comprehensive website and is written for a worldwide audience so not everything on their will apply to you    I recommend the book for 36-hour day by Libra.  This is a very comprehensive easy-to-read reference related to Alzheimer's dementia and it is written for the caregiver.  It is fully comprehensive everything in this book may not apply to your particular case     Office Policies    Phone calls/patient messages:  Please allow up to 24 hours for someone in the office to contact you about your call or message. Be mindful your provider may be out of the office or your message may require further review. We encourage you to use [employer] for your messages as this is a faster, more efficient way to communicate with our

## 2024-02-15 NOTE — ASSESSMENT & PLAN NOTE
At this point in time they are not threatening or harmful  Not recommend treating this directly.   We are increasing the donepezil which may improve the hallucinations  We will just continue to monitor presently

## 2024-03-08 ENCOUNTER — OFFICE VISIT (OUTPATIENT)
Age: 74
End: 2024-03-08
Payer: MEDICARE

## 2024-03-08 VITALS
HEART RATE: 92 BPM | DIASTOLIC BLOOD PRESSURE: 69 MMHG | WEIGHT: 136.6 LBS | BODY MASS INDEX: 22.76 KG/M2 | OXYGEN SATURATION: 97 % | HEIGHT: 65 IN | RESPIRATION RATE: 19 BRPM | SYSTOLIC BLOOD PRESSURE: 104 MMHG | TEMPERATURE: 97.7 F

## 2024-03-08 DIAGNOSIS — C85.91 NON-HODGKIN LYMPHOMA, UNSPECIFIED, LYMPH NODES OF HEAD, FACE, AND NECK (HCC): ICD-10-CM

## 2024-03-08 DIAGNOSIS — G63 VITAMIN B12 DEFICIENCY NEUROPATHY (HCC): ICD-10-CM

## 2024-03-08 DIAGNOSIS — G47.00 INSOMNIA, UNSPECIFIED TYPE: Primary | ICD-10-CM

## 2024-03-08 DIAGNOSIS — E53.8 VITAMIN B12 DEFICIENCY NEUROPATHY (HCC): ICD-10-CM

## 2024-03-08 DIAGNOSIS — F32.1 MAJOR DEPRESSIVE DISORDER, SINGLE EPISODE, MODERATE (HCC): ICD-10-CM

## 2024-03-08 DIAGNOSIS — H61.21 EXCESSIVE EAR WAX, RIGHT: ICD-10-CM

## 2024-03-08 DIAGNOSIS — D69.6 THROMBOCYTOPENIA, UNSPECIFIED (HCC): ICD-10-CM

## 2024-03-08 DIAGNOSIS — M05.9 RHEUMATOID ARTHRITIS WITH POSITIVE RHEUMATOID FACTOR, INVOLVING UNSPECIFIED SITE (HCC): ICD-10-CM

## 2024-03-08 PROCEDURE — G8427 DOCREV CUR MEDS BY ELIG CLIN: HCPCS | Performed by: INTERNAL MEDICINE

## 2024-03-08 PROCEDURE — 3017F COLORECTAL CA SCREEN DOC REV: CPT | Performed by: INTERNAL MEDICINE

## 2024-03-08 PROCEDURE — G8399 PT W/DXA RESULTS DOCUMENT: HCPCS | Performed by: INTERNAL MEDICINE

## 2024-03-08 PROCEDURE — 1036F TOBACCO NON-USER: CPT | Performed by: INTERNAL MEDICINE

## 2024-03-08 PROCEDURE — 1090F PRES/ABSN URINE INCON ASSESS: CPT | Performed by: INTERNAL MEDICINE

## 2024-03-08 PROCEDURE — 99214 OFFICE O/P EST MOD 30 MIN: CPT | Performed by: INTERNAL MEDICINE

## 2024-03-08 PROCEDURE — G8420 CALC BMI NORM PARAMETERS: HCPCS | Performed by: INTERNAL MEDICINE

## 2024-03-08 PROCEDURE — G8484 FLU IMMUNIZE NO ADMIN: HCPCS | Performed by: INTERNAL MEDICINE

## 2024-03-08 PROCEDURE — 1123F ACP DISCUSS/DSCN MKR DOCD: CPT | Performed by: INTERNAL MEDICINE

## 2024-03-08 RX ORDER — TRAZODONE HYDROCHLORIDE 50 MG/1
50 TABLET ORAL NIGHTLY
Qty: 90 TABLET | Refills: 1 | Status: SHIPPED | OUTPATIENT
Start: 2024-03-08

## 2024-03-08 NOTE — PROGRESS NOTES
\"Have you been to the ER, urgent care clinic since your last visit?  Hospitalized since your last visit?\"    NO    “Have you seen or consulted any other health care providers outside of [facility] since your last visit?”    NO    “Have you had a colorectal cancer screening such as a colonoscopy/FIT/Cologuard?    NO

## 2024-03-08 NOTE — PROGRESS NOTES
Ese Garcia is a 73 y.o. female who presents for evaluation of routine follow up for insomnia, dementia, hx NHL,hx kidney transplant recipient.  Has been using ambien for some time now, and it seems to be losing it's effectiveness.  When she takes 10 mg dose, she tends to sleep walk, and the 5 mg dose does not work very well.  Has added melatonin 5 mg with it, no success.  Did hearing eval, was advised that aids would help, but she does not want any.  Did cologuard, but sample sent back was too much!!!!  She then threw out the next sample, and does not want to do it again.  Daughter with her today.      ROS:  Constitutional: negative for fevers, chills, anorexia and weight loss  Eyes:   negative for visual disturbance and irritation  ENT:   negative for tinnitus,sore throat,nasal congestion,ear pain,hoarseness  Respiratory:  negative for cough, hemoptysis, dyspnea,wheezing  CV:   negative for chest pain, palpitations, lower extremity edema  GI:   negative for nausea, vomiting, diarrhea, abdominal pain,melena  Genitourinary: negative for frequency, dysuria and hematuria  Musculoskel: negative for myalgias, arthralgias, back pain, muscle weakness, joint pain  Neurological:  negative for headaches, dizziness, focal weakness, numbness  Psychiatric:     Negative for depression or anxiety      Past Medical History:   Diagnosis Date    Acute hypoxemic respiratory failure (HCC) 1/16/2023    Dec 2022 went to ED     Chronic kidney disease     Depression     GAURAV (generalized anxiety disorder)     Insomnia     MCI (mild cognitive impairment) with memory loss 06/2022    RA (rheumatoid arthritis) (HCC)     Throat cancer (HCC)        Past Surgical History:   Procedure Laterality Date    GYN      hysterectomy     TRANSPLANT  04/03/1997    kidney       Family History   Problem Relation Age of Onset    Diabetes Mother     No Known Problems Father        Social History     Socioeconomic History    Marital status:

## 2024-04-30 RX ORDER — PREDNISONE 5 MG/1
TABLET ORAL
Qty: 90 TABLET | Refills: 3 | Status: SHIPPED | OUTPATIENT
Start: 2024-04-30

## 2024-05-31 DIAGNOSIS — F41.9 ANXIETY DISORDER, UNSPECIFIED: ICD-10-CM

## 2024-06-07 RX ORDER — PREDNISONE 20 MG/1
TABLET ORAL
Qty: 18 TABLET | Refills: 0 | OUTPATIENT
Start: 2024-06-07

## 2024-06-17 ENCOUNTER — OFFICE VISIT (OUTPATIENT)
Age: 74
End: 2024-06-17
Payer: MEDICARE

## 2024-06-17 VITALS
SYSTOLIC BLOOD PRESSURE: 100 MMHG | BODY MASS INDEX: 22.33 KG/M2 | HEART RATE: 77 BPM | OXYGEN SATURATION: 96 % | HEIGHT: 65 IN | DIASTOLIC BLOOD PRESSURE: 68 MMHG | RESPIRATION RATE: 16 BRPM | WEIGHT: 134 LBS | TEMPERATURE: 97.5 F

## 2024-06-17 DIAGNOSIS — R44.1 HALLUCINATIONS, VISUAL: ICD-10-CM

## 2024-06-17 DIAGNOSIS — F32.A ANXIETY AND DEPRESSION: ICD-10-CM

## 2024-06-17 DIAGNOSIS — R41.9 COGNITIVE SAFETY ISSUE: ICD-10-CM

## 2024-06-17 DIAGNOSIS — F02.B2 MODERATE LEWY BODY DEMENTIA WITH PSYCHOTIC DISTURBANCE (HCC): Primary | ICD-10-CM

## 2024-06-17 DIAGNOSIS — F41.9 ANXIETY AND DEPRESSION: ICD-10-CM

## 2024-06-17 DIAGNOSIS — G31.83 MODERATE LEWY BODY DEMENTIA WITH PSYCHOTIC DISTURBANCE (HCC): Primary | ICD-10-CM

## 2024-06-17 PROBLEM — F32.1 CURRENT MODERATE EPISODE OF MAJOR DEPRESSIVE DISORDER WITHOUT PRIOR EPISODE (HCC): Status: RESOLVED | Noted: 2023-02-08 | Resolved: 2024-06-17

## 2024-06-17 PROBLEM — C85.90 NHL (NON-HODGKIN'S LYMPHOMA) (HCC): Status: RESOLVED | Noted: 2019-12-06 | Resolved: 2024-06-17

## 2024-06-17 PROCEDURE — 99214 OFFICE O/P EST MOD 30 MIN: CPT | Performed by: PSYCHIATRY & NEUROLOGY

## 2024-06-17 PROCEDURE — 1036F TOBACCO NON-USER: CPT | Performed by: PSYCHIATRY & NEUROLOGY

## 2024-06-17 PROCEDURE — G8399 PT W/DXA RESULTS DOCUMENT: HCPCS | Performed by: PSYCHIATRY & NEUROLOGY

## 2024-06-17 PROCEDURE — 1123F ACP DISCUSS/DSCN MKR DOCD: CPT | Performed by: PSYCHIATRY & NEUROLOGY

## 2024-06-17 PROCEDURE — G8420 CALC BMI NORM PARAMETERS: HCPCS | Performed by: PSYCHIATRY & NEUROLOGY

## 2024-06-17 PROCEDURE — 1090F PRES/ABSN URINE INCON ASSESS: CPT | Performed by: PSYCHIATRY & NEUROLOGY

## 2024-06-17 PROCEDURE — G8427 DOCREV CUR MEDS BY ELIG CLIN: HCPCS | Performed by: PSYCHIATRY & NEUROLOGY

## 2024-06-17 ASSESSMENT — PATIENT HEALTH QUESTIONNAIRE - PHQ9
SUM OF ALL RESPONSES TO PHQ QUESTIONS 1-9: 0
2. FEELING DOWN, DEPRESSED OR HOPELESS: NOT AT ALL
SUM OF ALL RESPONSES TO PHQ QUESTIONS 1-9: 0
SUM OF ALL RESPONSES TO PHQ9 QUESTIONS 1 & 2: 0
1. LITTLE INTEREST OR PLEASURE IN DOING THINGS: NOT AT ALL

## 2024-06-17 NOTE — ASSESSMENT & PLAN NOTE
Family has instituted some environmental safety issues such as turning off assistive and placing an ID bracelet on the patient's watch regarding diagnosis and contact information

## 2024-06-17 NOTE — ASSESSMENT & PLAN NOTE
May be a little bit more bothersome.  She presently is on sertraline 50 mg daily I have asked the daughter to increase that to 75 mg [taking 1.5 tablets of the 50 mg] for approximately 2 weeks if no major improvement to increase to total of 100 mg    Patient's daughter will let me know which dose she would prefer the patient to continue on after observation and I will send that prescription into the pharmacy    Agree with the patient going for walks several times per week and her daughter trying to get her out of the house to the store or something else to engage her in activities

## 2024-06-17 NOTE — PROGRESS NOTES
B12          Pertinent diagnostic data  Neuropsych testing completed by Dr. Hampton August 30, 2023  ASSESSMENT:  Moderate dementia with Lewy bodies with psychotic disturbance: G31.A3; F02.B2  Also noted in the assessment is that the patient is not maintain capacity to make decisions      5/26/2022 by Dr. Piña  EMG/NCS: This study was normal.  There was no electrodiagnostic evidence upon today’s examination suggesting a focal or generalized large fiber neuropathy, myopathy, or radiculopathy.  This study cannot exclude a small fiber neuropathy.  Clinical correlation is recommended.    Neuropsych testing completed by Dr. Quevedo February 16, 2021  DIAGNOSTIC IMPRESSIONS:      ICD-10-CM ICD-9-CM     1. Age-related cognitive decline  R41.81 294.9            Carotid duplex studies completed 4/11/2022  Interpretation Summary    This study consisted of pulsed wave Doppler examination, color flow imaging, and duplex imaging of both right and left carotid systems in both vertebral arteries     Imaging of both right and left carotid systems showed mild mixed plaque in the bifurcations and proximal and distal internal carotid arteries bilaterally with stenosis in the range of 0-15% only and with no flow abnormalities identified.  Both external carotid arteries and both common carotid arteries showed showed normal antegrade flow, and showed mild disease in the range of 0-15% only without associated flow abnormalities.     Both vertebral arteries showed normal antegrade flow.     Clinical correlation recommended.    Results from Hospital Encounter encounter on 05/05/22    MRI BRAIN WO CONT    Impression  1. No acute findings, no mass.  2. Slight progression of nonspecific white matter changes.  3. Some improvement in diffuse paranasal sinus and mastoid air cell disease.      Results from Hospital Encounter encounter on 03/13/20    MRI BRAIN WO CONT    Impression  IMPRESSION:  1. Bilateral mastoid and possibly middle ear effusions in

## 2024-06-17 NOTE — PATIENT INSTRUCTIONS
calls/patient messages:  Please allow up to 72 hours  for someone in the office to contact you about your call or message. Be mindful your provider may be out of the office or your message may require further review. We encourage you to use [employer] for your messages as this is a faster, more efficient way to communicate with our office    Medication Refills:  Prescription medications require up to 48 business hours to process. We encourage you to use [employer] for your refills.     For controlled medications: Please allow up to 72 business hours to process. Certain medications may require you to  a written prescription at our office.    NO narcotic/controlled medications will be prescribed after 4pm Monday through Friday or on weekends    Form/Paperwork Completion:  We ask that you allow 7-14 business days.  Forms can be downloaded to [employer] or you can have them faxed, mailed, or you can bring them in to our office directly.

## 2024-06-17 NOTE — ASSESSMENT & PLAN NOTE
Seemingly improved with the increase in donepezil from 5 mg to 10 mg.  Will continue to monitor over time

## 2024-06-17 NOTE — ASSESSMENT & PLAN NOTE
Patient is on donepezil 10 mg/day  Hallucinations have improved seemingly since increasing the donepezil up to 10 mg/day  Functional level seem to slowly decline  Since I am increasing her sertraline I am hesitating to add in memantine today

## 2024-08-05 RX ORDER — SODIUM BICARBONATE 650 MG/1
TABLET ORAL
Qty: 360 TABLET | Refills: 3 | Status: SHIPPED | OUTPATIENT
Start: 2024-08-05

## 2024-08-26 RX ORDER — TRAZODONE HYDROCHLORIDE 50 MG/1
50 TABLET, FILM COATED ORAL NIGHTLY
Qty: 90 TABLET | Refills: 1 | Status: SHIPPED | OUTPATIENT
Start: 2024-08-26

## 2024-08-27 ENCOUNTER — TELEPHONE (OUTPATIENT)
Age: 74
End: 2024-08-27

## 2024-08-27 RX ORDER — IPRATROPIUM BROMIDE 21 UG/1
2 SPRAY, METERED NASAL EVERY 12 HOURS
Qty: 30 ML | Refills: 3 | Status: SHIPPED | OUTPATIENT
Start: 2024-08-27 | End: 2024-08-30

## 2024-08-27 NOTE — TELEPHONE ENCOUNTER
Ke states that pt has had runny nose and runny eyes for about two months.   Claritin seems to make it worse.  She asked the pharm and they recommended this.    What should she do for pt?  Please call to advise.

## 2024-08-28 NOTE — TELEPHONE ENCOUNTER
Called/Spoke with pt    Informed pt that medication \"Atrovent\" was sent into to [facility].    Pt verbalized understanding.

## 2024-08-30 RX ORDER — IPRATROPIUM BROMIDE 21 UG/1
SPRAY, METERED NASAL
Qty: 90 ML | Refills: 1 | Status: SHIPPED | OUTPATIENT
Start: 2024-08-30

## 2024-11-24 DIAGNOSIS — F41.9 ANXIETY DISORDER, UNSPECIFIED: ICD-10-CM

## 2024-11-25 ENCOUNTER — OFFICE VISIT (OUTPATIENT)
Age: 74
End: 2024-11-25
Payer: MEDICARE

## 2024-11-25 VITALS
TEMPERATURE: 97.1 F | OXYGEN SATURATION: 100 % | RESPIRATION RATE: 14 BRPM | WEIGHT: 130.2 LBS | HEART RATE: 101 BPM | BODY MASS INDEX: 21.69 KG/M2 | SYSTOLIC BLOOD PRESSURE: 93 MMHG | DIASTOLIC BLOOD PRESSURE: 65 MMHG | HEIGHT: 65 IN

## 2024-11-25 VITALS
DIASTOLIC BLOOD PRESSURE: 82 MMHG | HEART RATE: 68 BPM | SYSTOLIC BLOOD PRESSURE: 124 MMHG | WEIGHT: 132 LBS | BODY MASS INDEX: 21.97 KG/M2

## 2024-11-25 DIAGNOSIS — F32.A ANXIETY AND DEPRESSION: ICD-10-CM

## 2024-11-25 DIAGNOSIS — R73.03 PREDIABETES: ICD-10-CM

## 2024-11-25 DIAGNOSIS — F02.B0 MODERATE LATE ONSET ALZHEIMER'S DEMENTIA WITHOUT BEHAVIORAL DISTURBANCE, PSYCHOTIC DISTURBANCE, MOOD DISTURBANCE, OR ANXIETY (HCC): ICD-10-CM

## 2024-11-25 DIAGNOSIS — R41.9 COGNITIVE SAFETY ISSUE: ICD-10-CM

## 2024-11-25 DIAGNOSIS — R44.1 HALLUCINATIONS, VISUAL: ICD-10-CM

## 2024-11-25 DIAGNOSIS — Z94.0 KIDNEY TRANSPLANT RECIPIENT: ICD-10-CM

## 2024-11-25 DIAGNOSIS — G31.83 MODERATE LEWY BODY DEMENTIA WITH PSYCHOTIC DISTURBANCE (HCC): Primary | ICD-10-CM

## 2024-11-25 DIAGNOSIS — M05.9 RHEUMATOID ARTHRITIS WITH POSITIVE RHEUMATOID FACTOR, INVOLVING UNSPECIFIED SITE (HCC): ICD-10-CM

## 2024-11-25 DIAGNOSIS — Z91.81 HISTORY OF RECENT FALL: ICD-10-CM

## 2024-11-25 DIAGNOSIS — E53.8 VITAMIN B12 DEFICIENCY NEUROPATHY (HCC): ICD-10-CM

## 2024-11-25 DIAGNOSIS — G63 VITAMIN B12 DEFICIENCY NEUROPATHY (HCC): ICD-10-CM

## 2024-11-25 DIAGNOSIS — E78.2 MIXED HYPERLIPIDEMIA: ICD-10-CM

## 2024-11-25 DIAGNOSIS — G30.1 MODERATE LATE ONSET ALZHEIMER'S DEMENTIA WITHOUT BEHAVIORAL DISTURBANCE, PSYCHOTIC DISTURBANCE, MOOD DISTURBANCE, OR ANXIETY (HCC): ICD-10-CM

## 2024-11-25 DIAGNOSIS — R42 DIZZY SPELLS: ICD-10-CM

## 2024-11-25 DIAGNOSIS — F41.9 ANXIETY AND DEPRESSION: ICD-10-CM

## 2024-11-25 DIAGNOSIS — N18.31 CHRONIC KIDNEY DISEASE, STAGE 3A (HCC): ICD-10-CM

## 2024-11-25 DIAGNOSIS — Z00.00 MEDICARE ANNUAL WELLNESS VISIT, SUBSEQUENT: Primary | ICD-10-CM

## 2024-11-25 DIAGNOSIS — F02.B2 MODERATE LEWY BODY DEMENTIA WITH PSYCHOTIC DISTURBANCE (HCC): Primary | ICD-10-CM

## 2024-11-25 DIAGNOSIS — H04.203 WATERY EYES: ICD-10-CM

## 2024-11-25 PROCEDURE — 99213 OFFICE O/P EST LOW 20 MIN: CPT | Performed by: INTERNAL MEDICINE

## 2024-11-25 PROCEDURE — 1123F ACP DISCUSS/DSCN MKR DOCD: CPT | Performed by: PSYCHIATRY & NEUROLOGY

## 2024-11-25 PROCEDURE — G8427 DOCREV CUR MEDS BY ELIG CLIN: HCPCS | Performed by: PSYCHIATRY & NEUROLOGY

## 2024-11-25 PROCEDURE — G8484 FLU IMMUNIZE NO ADMIN: HCPCS | Performed by: PSYCHIATRY & NEUROLOGY

## 2024-11-25 PROCEDURE — G8399 PT W/DXA RESULTS DOCUMENT: HCPCS | Performed by: PSYCHIATRY & NEUROLOGY

## 2024-11-25 PROCEDURE — 99215 OFFICE O/P EST HI 40 MIN: CPT | Performed by: PSYCHIATRY & NEUROLOGY

## 2024-11-25 PROCEDURE — 1036F TOBACCO NON-USER: CPT | Performed by: PSYCHIATRY & NEUROLOGY

## 2024-11-25 PROCEDURE — 1090F PRES/ABSN URINE INCON ASSESS: CPT | Performed by: PSYCHIATRY & NEUROLOGY

## 2024-11-25 PROCEDURE — G8420 CALC BMI NORM PARAMETERS: HCPCS | Performed by: PSYCHIATRY & NEUROLOGY

## 2024-11-25 PROCEDURE — 1159F MED LIST DOCD IN RCRD: CPT | Performed by: PSYCHIATRY & NEUROLOGY

## 2024-11-25 RX ORDER — PHENOL 1.4 %
AEROSOL, SPRAY (ML) MUCOUS MEMBRANE
COMMUNITY

## 2024-11-25 SDOH — ECONOMIC STABILITY: INCOME INSECURITY: HOW HARD IS IT FOR YOU TO PAY FOR THE VERY BASICS LIKE FOOD, HOUSING, MEDICAL CARE, AND HEATING?: NOT HARD AT ALL

## 2024-11-25 SDOH — ECONOMIC STABILITY: FOOD INSECURITY: WITHIN THE PAST 12 MONTHS, THE FOOD YOU BOUGHT JUST DIDN'T LAST AND YOU DIDN'T HAVE MONEY TO GET MORE.: NEVER TRUE

## 2024-11-25 SDOH — ECONOMIC STABILITY: FOOD INSECURITY: WITHIN THE PAST 12 MONTHS, YOU WORRIED THAT YOUR FOOD WOULD RUN OUT BEFORE YOU GOT MONEY TO BUY MORE.: NEVER TRUE

## 2024-11-25 ASSESSMENT — PATIENT HEALTH QUESTIONNAIRE - PHQ9
8. MOVING OR SPEAKING SO SLOWLY THAT OTHER PEOPLE COULD HAVE NOTICED. OR THE OPPOSITE, BEING SO FIGETY OR RESTLESS THAT YOU HAVE BEEN MOVING AROUND A LOT MORE THAN USUAL: NOT AT ALL
SUM OF ALL RESPONSES TO PHQ QUESTIONS 1-9: 4
2. FEELING DOWN, DEPRESSED OR HOPELESS: SEVERAL DAYS
1. LITTLE INTEREST OR PLEASURE IN DOING THINGS: NOT AT ALL
4. FEELING TIRED OR HAVING LITTLE ENERGY: MORE THAN HALF THE DAYS
SUM OF ALL RESPONSES TO PHQ QUESTIONS 1-9: 4
SUM OF ALL RESPONSES TO PHQ QUESTIONS 1-9: 4
10. IF YOU CHECKED OFF ANY PROBLEMS, HOW DIFFICULT HAVE THESE PROBLEMS MADE IT FOR YOU TO DO YOUR WORK, TAKE CARE OF THINGS AT HOME, OR GET ALONG WITH OTHER PEOPLE: NOT DIFFICULT AT ALL
9. THOUGHTS THAT YOU WOULD BE BETTER OFF DEAD, OR OF HURTING YOURSELF: NOT AT ALL
5. POOR APPETITE OR OVEREATING: NOT AT ALL
7. TROUBLE CONCENTRATING ON THINGS, SUCH AS READING THE NEWSPAPER OR WATCHING TELEVISION: NOT AT ALL
SUM OF ALL RESPONSES TO PHQ QUESTIONS 1-9: 4
6. FEELING BAD ABOUT YOURSELF - OR THAT YOU ARE A FAILURE OR HAVE LET YOURSELF OR YOUR FAMILY DOWN: NOT AT ALL
SUM OF ALL RESPONSES TO PHQ9 QUESTIONS 1 & 2: 1
3. TROUBLE FALLING OR STAYING ASLEEP: SEVERAL DAYS

## 2024-11-25 ASSESSMENT — LIFESTYLE VARIABLES
HOW MANY STANDARD DRINKS CONTAINING ALCOHOL DO YOU HAVE ON A TYPICAL DAY: PATIENT DOES NOT DRINK
HOW OFTEN DO YOU HAVE A DRINK CONTAINING ALCOHOL: NEVER

## 2024-11-25 NOTE — PROGRESS NOTES
[facility]  In Office FOLLOW-UP VISIT         Ese Garcia is a 74 y.o.  female who presents today for the following:  Chief Complaint   Patient presents with    Follow-up     Memory follow up        ASSESSMENT AND PLAN  1. 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    2. Vitamin B12 deficiency neuropathy (HCC)  Assessment & Plan:   Remained stable and well-controlled on vitamin B12 supplements  Would recommend continuing on the B12 at this time  3. Anxiety and depression  Assessment & Plan:  Patient continues to be stable with the use of sertraline.  I recommend continuing this presently.    4. Dizzy spells  Assessment & Plan:  This seems to have escalated again.  Recommended making sure they check blood pressure and pulse if possible during these times of the event.  I agree with continuing to walk with a walker and I will order Holter monitor to make sure there is no major cardiac concerns.    I have asked the patient to increase her free water intake and reduce the amount of soda that she drinks on a daily basis and continue to make sure she is eating adequate nutrition   Orders:  -     Extended cardiac holter monitor (3 days-14 day); Future  5. Cognitive safety issue  Assessment & Plan:  Family has instituted some environmental safety issues to include cameras ID bracelet and other such measures.  So far since last office visit there have been no falls there has been no wandering and no major safety concerns that have occurred encouraged him continue with their

## 2024-11-25 NOTE — PATIENT INSTRUCTIONS
smoke too.     Stay at a weight that's healthy for you. Talk to your doctor if you need help losing weight.     Try to get 7 to 9 hours of sleep each night.     Limit alcohol to 2 drinks a day for men and 1 drink a day for women. Too much alcohol can cause health problems.     Manage other health problems such as diabetes, high blood pressure, and high cholesterol. If you think you may have a problem with alcohol or drug use, talk to your doctor.   Medicines    Take your medicines exactly as prescribed. Call your doctor if you think you are having a problem with your medicine.     If your doctor recommends aspirin, take the amount directed each day. Make sure you take aspirin and not another kind of pain reliever, such as acetaminophen (Tylenol).   When should you call for help?   Call 911 if you have symptoms of a heart attack. These may include:    Chest pain or pressure, or a strange feeling in the chest.     Sweating.     Shortness of breath.     Pain, pressure, or a strange feeling in the back, neck, jaw, or upper belly or in one or both shoulders or arms.     Lightheadedness or sudden weakness.     A fast or irregular heartbeat.   After you call 911, the  may tell you to chew 1 adult-strength or 2 to 4 low-dose aspirin. Wait for an ambulance. Do not try to drive yourself.  Watch closely for changes in your health, and be sure to contact your doctor if you have any problems.  Where can you learn more?  Go to https://www.[employer]net/patientEd and enter F075 to learn more about \"A Healthy Heart: Care Instructions.\"  Current as of: June 24, 2023  Content Version: 14.2  © 2024 [employer].   Care instructions adapted under license by [employer]. If you have questions about a medical condition or this instruction, always ask your healthcare professional. [employer] disclaims any warranty or liability for your use of this information.      Personalized Preventive Plan for Ese GONSALVES

## 2024-11-25 NOTE — ASSESSMENT & PLAN NOTE
Patient continues to be stable with the use of sertraline.  I recommend continuing this presently.

## 2024-11-25 NOTE — PROGRESS NOTES
Patient and family member states patient has been fairly ok  Slight progression in memory status  Using walker more often, said she is having a hard time walking straight  Having issues distinguishing colors   Mentions she has a ref to see an eye  Has been having vision issues

## 2024-11-25 NOTE — PATIENT INSTRUCTIONS
As a reminder:   Please come to your appointment 15 minutes before your office appointment.  This way, you can get checked in at the  and checked in by the nursing staff so you have the full allotment of time with your provider for your visit.  Please bring an up-to-date and accurate list of all your medications.  Or bring all your active prescription bottles with you at the time of your office visit and this includes over-the-counter medications so we can make sure that your medication list is up-to-date.  If you are scheduled for a virtual visit, please be aware that the  will need to check you in and usually the day before to verify insurance and collect co-pays as appropriate.  Please be prepared for the second call which will be from the nurse to go over your medications and any other vital information.  This will probably be done 30 minutes prior to your visit.  The reason why we do this early is that you can get the full benefit of your appointment time with your provider.  Finally you will be given the link for your virtual visit please click into your link 10 minutes prior to your appointment and please wait patiently for the provider to join you        As per discussion  Patient Information:  Name: Ese Garcia  Age: 74  Medical History: Lewy body dementia with psychotic features, neuropathic pain in both feet due to vitamin B12 deficiency, dizziness.    Reason for Visit:  Ese Garcia visited for a follow-up on her Lewy body dementia with psychotic features, neuropathic pain in both feet, and recent dizziness. She reported that her hallucinations have improved with medication, but her memory issues are worsening. She also mentioned experiencing dizziness throughout the day for the past two weeks and leg cramps due to increased exercise.    Clinical Findings:  - Hallucinations have improved with sertraline and donepezil.  - Memory issues are worsening, with increased repetition and need

## 2024-11-25 NOTE — ASSESSMENT & PLAN NOTE
Patient is on donepezil 10 mg/day  Hallucinations have improved seemingly since increasing the donepezil up to 10 mg/day along with sertraline  Functional level seem to slowly decline  I am hesitant to add Namenda right now because she is having so many problems with dizziness I like to get that issue stabilized before adding any more medications and they are in agreement.

## 2024-11-25 NOTE — ASSESSMENT & PLAN NOTE
Family has instituted some environmental safety issues to include cameras ID bracelet and other such measures.  So far since last office visit there have been no falls there has been no wandering and no major safety concerns that have occurred encouraged him continue with their environmental safety precautions

## 2024-11-25 NOTE — PROGRESS NOTES
Medicare Annual Wellness Visit    Ese Garcia is here for Medicare AWV    Assessment & Plan   Medicare annual wellness visit, subsequent  Recommendations for Preventive Services Due: see orders and patient instructions/AVS.  Recommended screening schedule for the next 5-10 years is provided to the patient in written form: see Patient Instructions/AVS.     No follow-ups on file.     Subjective       Patient's complete Health Risk Assessment and screening values have been reviewed and are found in Flowsheets. The following problems were reviewed today and where indicated follow up appointments were made and/or referrals ordered.    Positive Risk Factor Screenings with Interventions:    Fall Risk:  Do you feel unsteady or are you worried about falling? : (!) yes  2 or more falls in past year?: (!) yes  Fall with injury in past year?: no     Interventions:    Reviewed medications, home hazards, visual acuity, and co-morbidities that can increase risk for falls            General HRA Questions:  Select all that apply: (!) New or Increased Fatigue  Interventions Fatigue:  See above       Poor Eating Habits/Diet:  Do you eat balanced/healthy meals regularly?: (!) No  Interventions:  Patient declines any further evaluation or treatment     Dentist Screen:  Have you seen the dentist within the past year?: (!) No    Intervention:  Advised to schedule with their dentist    Hearing Screen:  Do you or your family notice any trouble with your hearing that hasn't been managed with hearing aids?: (!) Yes    Interventions:  Patient declines any further evaluation or treatment     Safety:  Do you have any tripping hazards - loose or unsecured carpets or rugs?: (!) Yes  Do you have non-slip mats or non-slip surfaces or shower bars or grab bars in your shower or bathtub?: (!) No  Interventions:  See above    ADL's:   Patient reports needing help with:  Select all that apply: (!) Walking/Balance  Interventions:  See above    Advanced

## 2024-11-25 NOTE — ASSESSMENT & PLAN NOTE
Patient continues to be a fall risk.  They do have cameras in the house.  She is now having episodes of dizziness Holter monitor has been ordered to evaluate possibility of dysrhythmia.  I have asked him to try to check her blood pressure and pulse at the time of the events.  Continue with fall precautions presently

## 2024-11-25 NOTE — ASSESSMENT & PLAN NOTE
This seems to have escalated again.  Recommended making sure they check blood pressure and pulse if possible during these times of the event.  I agree with continuing to walk with a walker and I will order Holter monitor to make sure there is no major cardiac concerns.    I have asked the patient to increase her free water intake and reduce the amount of soda that she drinks on a daily basis and continue to make sure she is eating adequate nutrition

## 2024-11-26 LAB
ALBUMIN SERPL-MCNC: 3.2 G/DL (ref 3.5–5)
ALBUMIN/GLOB SERPL: 1 (ref 1.1–2.2)
ALP SERPL-CCNC: 111 U/L (ref 45–117)
ALT SERPL-CCNC: 23 U/L (ref 12–78)
ANION GAP SERPL CALC-SCNC: 5 MMOL/L (ref 2–12)
AST SERPL-CCNC: 20 U/L (ref 15–37)
BASOPHILS # BLD: 0 K/UL (ref 0–0.1)
BASOPHILS NFR BLD: 0 % (ref 0–1)
BILIRUB SERPL-MCNC: 0.7 MG/DL (ref 0.2–1)
BUN SERPL-MCNC: 16 MG/DL (ref 6–20)
BUN/CREAT SERPL: 12 (ref 12–20)
CALCIUM SERPL-MCNC: 8.9 MG/DL (ref 8.5–10.1)
CHLORIDE SERPL-SCNC: 111 MMOL/L (ref 97–108)
CHOLEST SERPL-MCNC: 156 MG/DL
CO2 SERPL-SCNC: 24 MMOL/L (ref 21–32)
CREAT SERPL-MCNC: 1.33 MG/DL (ref 0.55–1.02)
DIFFERENTIAL METHOD BLD: ABNORMAL
EOSINOPHIL # BLD: 0 K/UL (ref 0–0.4)
EOSINOPHIL NFR BLD: 0 % (ref 0–7)
ERYTHROCYTE [DISTWIDTH] IN BLOOD BY AUTOMATED COUNT: 14.6 % (ref 11.5–14.5)
EST. AVERAGE GLUCOSE BLD GHB EST-MCNC: 103 MG/DL
GLOBULIN SER CALC-MCNC: 3.2 G/DL (ref 2–4)
GLUCOSE SERPL-MCNC: 120 MG/DL (ref 65–100)
HBA1C MFR BLD: 5.2 % (ref 4–5.6)
HCT VFR BLD AUTO: 37.9 % (ref 35–47)
HDLC SERPL-MCNC: 68 MG/DL
HDLC SERPL: 2.3 (ref 0–5)
HGB BLD-MCNC: 12.3 G/DL (ref 11.5–16)
IMM GRANULOCYTES # BLD AUTO: 0 K/UL (ref 0–0.04)
IMM GRANULOCYTES NFR BLD AUTO: 0 % (ref 0–0.5)
LDLC SERPL CALC-MCNC: 67.4 MG/DL (ref 0–100)
LYMPHOCYTES # BLD: 0.9 K/UL (ref 0.8–3.5)
LYMPHOCYTES NFR BLD: 10 % (ref 12–49)
MCH RBC QN AUTO: 32.5 PG (ref 26–34)
MCHC RBC AUTO-ENTMCNC: 32.5 G/DL (ref 30–36.5)
MCV RBC AUTO: 100 FL (ref 80–99)
MONOCYTES # BLD: 0.5 K/UL (ref 0–1)
MONOCYTES NFR BLD: 6 % (ref 5–13)
NEUTS SEG # BLD: 7 K/UL (ref 1.8–8)
NEUTS SEG NFR BLD: 84 % (ref 32–75)
NRBC # BLD: 0 K/UL (ref 0–0.01)
NRBC BLD-RTO: 0 PER 100 WBC
PLATELET # BLD AUTO: 155 K/UL (ref 150–400)
PMV BLD AUTO: 11.6 FL (ref 8.9–12.9)
POTASSIUM SERPL-SCNC: 5.5 MMOL/L (ref 3.5–5.1)
PROT SERPL-MCNC: 6.4 G/DL (ref 6.4–8.2)
RBC # BLD AUTO: 3.79 M/UL (ref 3.8–5.2)
SODIUM SERPL-SCNC: 140 MMOL/L (ref 136–145)
TRIGL SERPL-MCNC: 103 MG/DL
TSH SERPL DL<=0.05 MIU/L-ACNC: 3.05 UIU/ML (ref 0.36–3.74)
VLDLC SERPL CALC-MCNC: 20.6 MG/DL
WBC # BLD AUTO: 8.5 K/UL (ref 3.6–11)

## 2024-11-27 DIAGNOSIS — E87.5 HYPERKALEMIA: Primary | ICD-10-CM

## 2024-11-27 RX ORDER — POTASSIUM CHLORIDE 750 MG/1
10 TABLET, EXTENDED RELEASE ORAL DAILY
Qty: 90 TABLET | Refills: 3
Start: 2024-11-27

## 2024-11-27 NOTE — RESULT ENCOUNTER NOTE
Patient notified of results and response from Dayne Marquis III, DO:    Pt verbalized understanding.

## 2024-12-04 ENCOUNTER — HOSPITAL ENCOUNTER (OUTPATIENT)
Facility: HOSPITAL | Age: 74
Discharge: HOME | End: 2024-12-06

## 2024-12-04 DIAGNOSIS — R42 DIZZY SPELLS: ICD-10-CM

## 2024-12-09 ENCOUNTER — TELEPHONE (OUTPATIENT)
Age: 74
End: 2024-12-09

## 2024-12-24 ENCOUNTER — TELEPHONE (OUTPATIENT)
Age: 74
End: 2024-12-24

## 2024-12-24 NOTE — TELEPHONE ENCOUNTER
Enrolled with Zio Patch - Ordered and being shipped to patient's home address on file.  ETA within 5-7 Business Days.     Sammy Block MD Baker, Jami; Gabriela Abel  Cc: Maryann Pool, OBED Ochoa/Francis: Please order a 2-week Zio patch to assess for dizziness, AV block and bradycardia.    Can we also call the patient that this monitor is recommended by cardiology to assess her arrhythmia and dizziness issues in more detail.  Please explain to patient usual instructions about mailing the monitor back.    Thank you    Pt called office back. Confirmed I could send the 14 day holter to pt and she will wear for 14 days and mail back. Went over all instructions and why Dr. Block wanted her to wear the monitor.

## 2025-01-17 DIAGNOSIS — F02.B2 MODERATE LEWY BODY DEMENTIA WITH PSYCHOTIC DISTURBANCE (HCC): ICD-10-CM

## 2025-01-17 DIAGNOSIS — G31.83 MODERATE LEWY BODY DEMENTIA WITH PSYCHOTIC DISTURBANCE (HCC): ICD-10-CM

## 2025-01-17 RX ORDER — DONEPEZIL HYDROCHLORIDE 10 MG/1
10 TABLET, FILM COATED ORAL DAILY
Qty: 90 TABLET | Refills: 3 | Status: SHIPPED | OUTPATIENT
Start: 2025-01-17

## 2025-01-28 ENCOUNTER — TRANSCRIBE ORDERS (OUTPATIENT)
Facility: HOSPITAL | Age: 75
End: 2025-01-28

## 2025-01-28 DIAGNOSIS — Z12.31 VISIT FOR SCREENING MAMMOGRAM: Primary | ICD-10-CM

## 2025-02-04 RX ORDER — TRAZODONE HYDROCHLORIDE 50 MG/1
50 TABLET, FILM COATED ORAL NIGHTLY
Qty: 90 TABLET | Refills: 3 | Status: SHIPPED | OUTPATIENT
Start: 2025-02-04

## 2025-02-17 ENCOUNTER — TELEPHONE (OUTPATIENT)
Age: 75
End: 2025-02-17

## 2025-02-17 RX ORDER — SUMATRIPTAN 50 MG/1
50 TABLET, FILM COATED ORAL DAILY PRN
Qty: 9 TABLET | Refills: 5 | Status: SHIPPED | OUTPATIENT
Start: 2025-02-17

## 2025-02-17 NOTE — TELEPHONE ENCOUNTER
Patients POA called in stating she has been having anxiety attacks for the last week with migraines, Ke is asking for a script to be sent into pharmacy       Please send to:    [id number] [place] - [address] - P [phone] - F [phone]  [address]  [facility] [address]  Phone: [phone] Fax: [phone]         Visit / Appointment History:  Future Appointment at [facility]:  5/27/2025   Last Appointment With PCP:  11/25/2024       Caller confirmed instructions and dosages as correct.    Caller was advised that Meds will be refilled as soon as possible, however there can be a 48-72 business hour turn around on refill requests.

## 2025-02-18 RX ORDER — BUSPIRONE HYDROCHLORIDE 5 MG/1
5 TABLET ORAL EVERY 12 HOURS PRN
Qty: 60 TABLET | Refills: 1 | Status: SHIPPED | OUTPATIENT
Start: 2025-02-18

## 2025-02-18 NOTE — TELEPHONE ENCOUNTER
Called/Spoke with pt's daughter-    Informed of PCP reponse; Verbalized understanding;     Daughter stated what to do about the panic attacks, mentioned that pt doesn't always have a migraine with anxiety attack. How to handle?  *Informed will route to provider for further instructions.     stretcher

## 2025-02-19 NOTE — TELEPHONE ENCOUNTER
Called pt; no answer    Able to leave a voice message to call clinic back at earliest convenience.    Notified that PCP sent in Rx [facility]

## 2025-02-21 RX ORDER — POTASSIUM CHLORIDE 750 MG/1
10 TABLET, EXTENDED RELEASE ORAL 2 TIMES DAILY
Qty: 180 TABLET | Refills: 2 | Status: SHIPPED | OUTPATIENT
Start: 2025-02-21

## 2025-02-27 ENCOUNTER — HOSPITAL ENCOUNTER (OUTPATIENT)
Facility: HOSPITAL | Age: 75
Discharge: HOME | End: 2025-02-27
Attending: INTERNAL MEDICINE
Payer: MEDICARE

## 2025-02-27 DIAGNOSIS — Z12.31 VISIT FOR SCREENING MAMMOGRAM: ICD-10-CM

## 2025-02-27 PROCEDURE — 77063 BREAST TOMOSYNTHESIS BI: CPT

## 2025-03-03 ENCOUNTER — HOSPITAL ENCOUNTER (OUTPATIENT)
Facility: HOSPITAL | Age: 75
Discharge: HOME | End: 2025-03-06
Attending: INTERNAL MEDICINE
Payer: MEDICARE

## 2025-03-03 DIAGNOSIS — R92.8 ABNORMAL MAMMOGRAM: ICD-10-CM

## 2025-03-03 PROCEDURE — G0279 TOMOSYNTHESIS, MAMMO: HCPCS

## 2025-03-06 NOTE — TELEPHONE ENCOUNTER
Susan Salgado  Verified on hipaa dated 9-28-20 and scanned 10/29/20          Requesting refill of:    zolpidem (AMBIEN) 5 mg tablet    States please send to mail service :  Fax [phone]  Phone: [phone]            (she states she doesn't know the name of mail service but that is the phone and fax for pharmacy on the [facility]) No

## 2025-04-08 ENCOUNTER — OFFICE VISIT (OUTPATIENT)
Age: 75
End: 2025-04-08
Payer: MEDICARE

## 2025-04-08 VITALS
RESPIRATION RATE: 16 BRPM | HEART RATE: 90 BPM | DIASTOLIC BLOOD PRESSURE: 87 MMHG | HEIGHT: 65 IN | BODY MASS INDEX: 21.66 KG/M2 | SYSTOLIC BLOOD PRESSURE: 161 MMHG | OXYGEN SATURATION: 100 % | TEMPERATURE: 98.7 F | WEIGHT: 130 LBS

## 2025-04-08 DIAGNOSIS — L98.9 SKIN LESIONS, GENERALIZED: Primary | ICD-10-CM

## 2025-04-08 DIAGNOSIS — M05.9 RHEUMATOID ARTHRITIS WITH POSITIVE RHEUMATOID FACTOR, INVOLVING UNSPECIFIED SITE (HCC): ICD-10-CM

## 2025-04-08 DIAGNOSIS — F02.B2 MODERATE LEWY BODY DEMENTIA WITH PSYCHOTIC DISTURBANCE (HCC): ICD-10-CM

## 2025-04-08 DIAGNOSIS — G31.83 MODERATE LEWY BODY DEMENTIA WITH PSYCHOTIC DISTURBANCE (HCC): ICD-10-CM

## 2025-04-08 DIAGNOSIS — N18.31 CHRONIC KIDNEY DISEASE, STAGE 3A (HCC): ICD-10-CM

## 2025-04-08 PROCEDURE — 1126F AMNT PAIN NOTED NONE PRSNT: CPT | Performed by: INTERNAL MEDICINE

## 2025-04-08 PROCEDURE — 99213 OFFICE O/P EST LOW 20 MIN: CPT | Performed by: INTERNAL MEDICINE

## 2025-04-08 PROCEDURE — G8427 DOCREV CUR MEDS BY ELIG CLIN: HCPCS | Performed by: INTERNAL MEDICINE

## 2025-04-08 PROCEDURE — G8399 PT W/DXA RESULTS DOCUMENT: HCPCS | Performed by: INTERNAL MEDICINE

## 2025-04-08 PROCEDURE — 1159F MED LIST DOCD IN RCRD: CPT | Performed by: INTERNAL MEDICINE

## 2025-04-08 PROCEDURE — 1123F ACP DISCUSS/DSCN MKR DOCD: CPT | Performed by: INTERNAL MEDICINE

## 2025-04-08 PROCEDURE — 1090F PRES/ABSN URINE INCON ASSESS: CPT | Performed by: INTERNAL MEDICINE

## 2025-04-08 PROCEDURE — 1160F RVW MEDS BY RX/DR IN RCRD: CPT | Performed by: INTERNAL MEDICINE

## 2025-04-08 PROCEDURE — 1036F TOBACCO NON-USER: CPT | Performed by: INTERNAL MEDICINE

## 2025-04-08 PROCEDURE — G8420 CALC BMI NORM PARAMETERS: HCPCS | Performed by: INTERNAL MEDICINE

## 2025-04-08 SDOH — ECONOMIC STABILITY: FOOD INSECURITY: WITHIN THE PAST 12 MONTHS, YOU WORRIED THAT YOUR FOOD WOULD RUN OUT BEFORE YOU GOT MONEY TO BUY MORE.: NEVER TRUE

## 2025-04-08 SDOH — ECONOMIC STABILITY: FOOD INSECURITY: WITHIN THE PAST 12 MONTHS, THE FOOD YOU BOUGHT JUST DIDN'T LAST AND YOU DIDN'T HAVE MONEY TO GET MORE.: NEVER TRUE

## 2025-04-08 ASSESSMENT — PATIENT HEALTH QUESTIONNAIRE - PHQ9
5. POOR APPETITE OR OVEREATING: NOT AT ALL
2. FEELING DOWN, DEPRESSED OR HOPELESS: NOT AT ALL
3. TROUBLE FALLING OR STAYING ASLEEP: NOT AT ALL
SUM OF ALL RESPONSES TO PHQ QUESTIONS 1-9: 1
SUM OF ALL RESPONSES TO PHQ QUESTIONS 1-9: 1
4. FEELING TIRED OR HAVING LITTLE ENERGY: NOT AT ALL
8. MOVING OR SPEAKING SO SLOWLY THAT OTHER PEOPLE COULD HAVE NOTICED. OR THE OPPOSITE, BEING SO FIGETY OR RESTLESS THAT YOU HAVE BEEN MOVING AROUND A LOT MORE THAN USUAL: NOT AT ALL
6. FEELING BAD ABOUT YOURSELF - OR THAT YOU ARE A FAILURE OR HAVE LET YOURSELF OR YOUR FAMILY DOWN: NOT AT ALL
1. LITTLE INTEREST OR PLEASURE IN DOING THINGS: NOT AT ALL
SUM OF ALL RESPONSES TO PHQ QUESTIONS 1-9: 1
SUM OF ALL RESPONSES TO PHQ QUESTIONS 1-9: 1
10. IF YOU CHECKED OFF ANY PROBLEMS, HOW DIFFICULT HAVE THESE PROBLEMS MADE IT FOR YOU TO DO YOUR WORK, TAKE CARE OF THINGS AT HOME, OR GET ALONG WITH OTHER PEOPLE: NOT DIFFICULT AT ALL
9. THOUGHTS THAT YOU WOULD BE BETTER OFF DEAD, OR OF HURTING YOURSELF: NOT AT ALL
7. TROUBLE CONCENTRATING ON THINGS, SUCH AS READING THE NEWSPAPER OR WATCHING TELEVISION: SEVERAL DAYS

## 2025-04-08 NOTE — PROGRESS NOTES
Identified pt with two pt identifiers(name and ). Reviewed record in preparation for visit and have obtained necessary documentation. All patient medications has been reviewed.    Chief Complaint   Patient presents with    referral      For x1 year; experiencing Brownish color spots located on face, upper chest and back with itching           Vitals:    25 1510 25 1547   BP: (!) 156/83 (!) 161/87   BP Site: Right Upper Arm Right Upper Arm   Patient Position: Sitting Sitting   BP Cuff Size: Small Adult Small Adult   Pulse: 90    Resp: 16    Temp: 98.7 °F (37.1 °C)    TempSrc: Temporal    SpO2: 100%    Weight: 59 kg (130 lb)    Height: 1.651 m (5' 5\")       .  \"Have you been to the ER, urgent care clinic since your last visit?  Hospitalized since your last visit?\"    no    “Have you seen or consulted any other health care providers outside our system since your last visit?”    no      
Size: Small Adult)   Pulse 90   Temp 98.7 °F (37.1 °C) (Temporal)   Resp 16   Ht 1.651 m (5' 5\")   Wt 59 kg (130 lb)   SpO2 100%   BMI 21.63 kg/m²     Physical Examination:   General - Well appearing female.  Numerous skin lesions on face, chest, forehead.  HEENT - PERRL, TM no erythema/opacification, normal nasal turbinates, no oropharyngeal erythema or exudate, MMM  Neck - supple, no bruits, no thyroidomegaly, no lymphadenopathy  Pulm - clear to auscultation bilaterally  Cardio - RRR, normal S1 S2, no murmur  Abd - soft, nontender, no masses, no HSM  Extrem - no edema, +2 distal pulses  Neuro-  No focal deficits, CN intact     Assessment/Plan:     Skin lesions--referral to derm, dr nguyễn sands for complete skin exam  Alz dementia--continue aricept  GAURAV, depression--continue zoloft, buspar  Hx kidney transplant recipient--continue prednisone, bicarbonate, cyclosporine  Migraines--prn imitrex helps  Insomnia--continue trazodone, melatonin    Rtc 6 months for awv        Dayne Marquis, DO

## 2025-04-10 ENCOUNTER — TELEPHONE (OUTPATIENT)
Age: 75
End: 2025-04-10

## 2025-04-10 NOTE — TELEPHONE ENCOUNTER
Called/Spoke with Ke    Mentioned onset 4/9/25- Bad Headache/ Tired   Offered Imitrex- Declined/Took Tylenol instead.    Around 9:45 AM- in a lot of pain- Head felt as if it was going to Explode- Offered imitrex again, and Zquil.     Won't get up to eat- refused; only wants to sleep.  Daughter is having a hard time trying to communicate with pt    No Medication/Allergy changes// Wednesday- Took RSV Vaccine. (Updated)    No SOB/Chest pain- Stated that this is not the first episode of head pain.   Attempted Urgent Care- Refused.

## 2025-04-10 NOTE — TELEPHONE ENCOUNTER
TRIAGE REQUESTED      Caller States:  Current Symptoms:   Headache  onset yesterday  [place] like \"head was going to explode\"  Took imatrex  Headache continues today  Took imatrex this morning  Slept all day, feels groggy  Head still feels \"like its going to explode\"        Appointments:  Appointment offers during call:  None offered due to nature of symptoms, referred to clinical team for triage  Date of last appointment:    4/8/2025           TRANSFERRED TO CLINICAL STAFF: Matthias        Caller confirms readback of documented phone/fax number(s) as correct.    Caller advised that if pt deems they cannot wait any longer or symptoms worsen, ED or UC would be another option to consider for immediate treatment.

## 2025-04-11 NOTE — TELEPHONE ENCOUNTER
Called/Spoke with montrell    Took to ER 4/10/25.  CT scan completed- Normal    ER Mentioned had a really bad sinus infection-   Given Benadryl, reglin, Toradol, Augmentin.     Augmentin script for at home.

## 2025-04-13 RX ORDER — BUSPIRONE HYDROCHLORIDE 5 MG/1
5 TABLET ORAL EVERY 12 HOURS PRN
Qty: 60 TABLET | Refills: 5 | Status: SHIPPED | OUTPATIENT
Start: 2025-04-13

## 2025-04-15 RX ORDER — PREDNISONE 5 MG/1
5 TABLET ORAL DAILY
Qty: 90 TABLET | Refills: 3 | Status: SHIPPED | OUTPATIENT
Start: 2025-04-15

## 2025-04-21 ENCOUNTER — TELEPHONE (OUTPATIENT)
Age: 75
End: 2025-04-21

## 2025-04-21 NOTE — TELEPHONE ENCOUNTER
Ke states pcp suggested pt get RSV shot, states since getting shot pt has been very tired and sleeps all day not sure if it is because of the shot or something else.     Ke corea took pt to ED on 04/10/25 because she wasn't able to wake pt up for about 5 mins, dx sinus infection, states pt completed antibiotics.    Confirmed pharmacy on file.     Please call to discuss.

## 2025-05-14 DIAGNOSIS — F41.9 ANXIETY DISORDER, UNSPECIFIED: ICD-10-CM

## 2025-07-15 ENCOUNTER — OFFICE VISIT (OUTPATIENT)
Age: 75
End: 2025-07-15
Payer: MEDICARE

## 2025-07-15 VITALS
DIASTOLIC BLOOD PRESSURE: 82 MMHG | HEART RATE: 72 BPM | BODY MASS INDEX: 20.83 KG/M2 | SYSTOLIC BLOOD PRESSURE: 112 MMHG | RESPIRATION RATE: 16 BRPM | HEIGHT: 65 IN | OXYGEN SATURATION: 98 % | WEIGHT: 125 LBS

## 2025-07-15 DIAGNOSIS — R44.1 HALLUCINATIONS, VISUAL: ICD-10-CM

## 2025-07-15 DIAGNOSIS — F02.B2 MODERATE LEWY BODY DEMENTIA WITH PSYCHOTIC DISTURBANCE (HCC): Primary | ICD-10-CM

## 2025-07-15 DIAGNOSIS — G31.83 MODERATE LEWY BODY DEMENTIA WITH PSYCHOTIC DISTURBANCE (HCC): Primary | ICD-10-CM

## 2025-07-15 DIAGNOSIS — R42 DIZZY SPELLS: ICD-10-CM

## 2025-07-15 PROCEDURE — 99214 OFFICE O/P EST MOD 30 MIN: CPT | Performed by: PSYCHIATRY & NEUROLOGY

## 2025-07-15 PROCEDURE — G8399 PT W/DXA RESULTS DOCUMENT: HCPCS | Performed by: PSYCHIATRY & NEUROLOGY

## 2025-07-15 PROCEDURE — 1090F PRES/ABSN URINE INCON ASSESS: CPT | Performed by: PSYCHIATRY & NEUROLOGY

## 2025-07-15 PROCEDURE — 1126F AMNT PAIN NOTED NONE PRSNT: CPT | Performed by: PSYCHIATRY & NEUROLOGY

## 2025-07-15 PROCEDURE — 1160F RVW MEDS BY RX/DR IN RCRD: CPT | Performed by: PSYCHIATRY & NEUROLOGY

## 2025-07-15 PROCEDURE — G8427 DOCREV CUR MEDS BY ELIG CLIN: HCPCS | Performed by: PSYCHIATRY & NEUROLOGY

## 2025-07-15 PROCEDURE — 1159F MED LIST DOCD IN RCRD: CPT | Performed by: PSYCHIATRY & NEUROLOGY

## 2025-07-15 PROCEDURE — 1036F TOBACCO NON-USER: CPT | Performed by: PSYCHIATRY & NEUROLOGY

## 2025-07-15 PROCEDURE — G8420 CALC BMI NORM PARAMETERS: HCPCS | Performed by: PSYCHIATRY & NEUROLOGY

## 2025-07-15 PROCEDURE — G2211 COMPLEX E/M VISIT ADD ON: HCPCS | Performed by: PSYCHIATRY & NEUROLOGY

## 2025-07-15 PROCEDURE — 1123F ACP DISCUSS/DSCN MKR DOCD: CPT | Performed by: PSYCHIATRY & NEUROLOGY

## 2025-07-15 RX ORDER — AZITHROMYCIN 250 MG/1
250 TABLET, FILM COATED ORAL DAILY
COMMUNITY
Start: 2025-07-14

## 2025-07-15 RX ORDER — PROCHLORPERAZINE MALEATE 10 MG
10 TABLET ORAL EVERY 8 HOURS PRN
COMMUNITY
Start: 2025-07-14

## 2025-07-15 RX ORDER — GUAIFENESIN 400 MG/1
400 TABLET ORAL 4 TIMES DAILY PRN
COMMUNITY

## 2025-07-15 RX ORDER — MECLIZINE HYDROCHLORIDE 25 MG/1
25 TABLET ORAL 3 TIMES DAILY PRN
COMMUNITY

## 2025-07-15 ASSESSMENT — PATIENT HEALTH QUESTIONNAIRE - PHQ9
SUM OF ALL RESPONSES TO PHQ QUESTIONS 1-9: 0
SUM OF ALL RESPONSES TO PHQ QUESTIONS 1-9: 0
1. LITTLE INTEREST OR PLEASURE IN DOING THINGS: NOT AT ALL
SUM OF ALL RESPONSES TO PHQ QUESTIONS 1-9: 0
2. FEELING DOWN, DEPRESSED OR HOPELESS: NOT AT ALL
SUM OF ALL RESPONSES TO PHQ QUESTIONS 1-9: 0

## 2025-07-15 NOTE — PROGRESS NOTES
[facility]  In Office FOLLOW-UP VISIT         Ese Garcia is a 75 y.o.  female who presents today for the following:  Chief Complaint   Patient presents with    Dementia     Went to ER [facility] over the weekend on Sunday and determined she has sinus infection        ASSESSMENT AND PLAN  1. Moderate Lewy body dementia with psychotic disturbance (HCC)  Assessment & Plan:  Patient continues on donepezil 10 mg/day.    Hallucinations have significantly improved with the donepezil as well as the sertraline she is to continue on both presently    Functional ability seems to continually decline.  Some of this may be magnified by her current sinus infection    Eventually would like to add Namenda but until we get her other issues stabilized would prefer not to add any more medications if possible at this time   2. Dizzy spells  Assessment & Plan:  Patient has been evaluated by cardiology to include a ZIO monitor.  The results failed to demonstrate any significant pathology that would be contributing to her dizziness    She has been diagnosed with severe sinus infection and started on a Z-Vikram along with some symptomatic relief and is to set up an appointment with Dr. Dana OCASIO as directed by the ED.     I will also check carotid duplex studies to make sure that we are not developing any level of significant stenosis contributing to symptoms.  She had this back in 2022 with normal results   Orders:  -     Vascular duplex carotid bilateral; Future  3. Hallucinations, visual  Assessment & Plan:  Improved with donepezil along with sertraline continue on both               Patient and/or family was given time to ask questions and voice concerns. I believe all questions concerns were adequately addressed at this  office visit.  Patient and/or family also verbalized agreement and understanding of the above-stated plan    Complex neurologic decision making secondary any or all of the

## 2025-07-15 NOTE — PATIENT INSTRUCTIONS
As per discussion  Lots of moving parts at this point.  I agree with seeing Dr. Cortez and I agree with getting you on antibiotics for your sinus infection.  If the meclizine helps your dizziness continue on that until you see Dr. Cortez and follow his directions if he gets too sleepy with the meclizine you can break it in half and if you find that you are getting no benefit then just discontinue it    From my perspective things are reasonably stable you may be perceiving you are getting a little bit worse because you do have a sinus infection so lets see how things go when that clears up    We know that your dizziness is not coming from your heart that is been thoroughly evaluated at this time.  And I do not believe this is coming from a neuro perspective.  Lets see what Dr. Cortez has to say    Continue all your medications otherwise unchanged    I do want to check carotid duplex studies which is an ultrasound of your blood vessels to make sure that is not causing any problems related to the dizziness.                As a reminder:   Please come to your appointment 15 minutes before your office appointment.  This way, you can get checked in at the  and checked in by the nursing staff so you have the full allotment of time with your provider for your visit.  Please bring an up-to-date and accurate list of all your medications.  Or bring all your active prescription bottles with you at the time of your office visit and this includes over-the-counter medications so we can make sure that your medication list is up-to-date.  If you are scheduled for a virtual visit, please be aware that the  will need to check you in and usually the day before to verify insurance and collect co-pays as appropriate.  Please be prepared for the second call which will be from the nurse to go over your medications and any other vital information.  This will probably be done 30 minutes prior to your visit.  The reason why

## 2025-07-15 NOTE — ASSESSMENT & PLAN NOTE
Patient has been evaluated by cardiology to include a ZIO monitor.  The results failed to demonstrate any significant pathology that would be contributing to her dizziness    She has been diagnosed with severe sinus infection and started on a Z-Vkiram along with some symptomatic relief and is to set up an appointment with Dr. Dana OCASIO as directed by the ED.     I will also check carotid duplex studies to make sure that we are not developing any level of significant stenosis contributing to symptoms.  She had this back in 2022 with normal results

## 2025-07-15 NOTE — ASSESSMENT & PLAN NOTE
Patient continues on donepezil 10 mg/day.    Hallucinations have significantly improved with the donepezil as well as the sertraline she is to continue on both presently    Functional ability seems to continually decline.  Some of this may be magnified by her current sinus infection    Eventually would like to add Namenda but until we get her other issues stabilized would prefer not to add any more medications if possible at this time

## 2025-07-16 ENCOUNTER — TELEPHONE (OUTPATIENT)
Age: 75
End: 2025-07-16

## 2025-07-16 NOTE — TELEPHONE ENCOUNTER
Pts daughter stopped in office states pt was in [facility] on [address] 7/13 for Dizziness & All over tingling.     Daughter inquiring if PCP received records and if not wants records requested for PCP to review.    Please call daughter to discuss.